# Patient Record
Sex: FEMALE | Race: OTHER | HISPANIC OR LATINO | Employment: FULL TIME | URBAN - METROPOLITAN AREA
[De-identification: names, ages, dates, MRNs, and addresses within clinical notes are randomized per-mention and may not be internally consistent; named-entity substitution may affect disease eponyms.]

---

## 2018-06-12 ENCOUNTER — HOSPITAL ENCOUNTER (EMERGENCY)
Facility: HOSPITAL | Age: 33
Discharge: HOME | End: 2018-06-12
Attending: EMERGENCY MEDICINE | Admitting: EMERGENCY MEDICINE
Payer: COMMERCIAL

## 2018-06-12 VITALS
RESPIRATION RATE: 18 BRPM | SYSTOLIC BLOOD PRESSURE: 125 MMHG | DIASTOLIC BLOOD PRESSURE: 63 MMHG | HEART RATE: 97 BPM | TEMPERATURE: 97.9 F | WEIGHT: 159 LBS | OXYGEN SATURATION: 97 %

## 2018-06-12 DIAGNOSIS — K02.9 INFECTED DENTAL CARRIES: ICD-10-CM

## 2018-06-12 DIAGNOSIS — K04.7 INFECTED DENTAL CARRIES: ICD-10-CM

## 2018-06-12 DIAGNOSIS — K08.89 DENTALGIA: Primary | ICD-10-CM

## 2018-06-12 PROCEDURE — 99282 EMERGENCY DEPT VISIT SF MDM: CPT

## 2018-06-12 PROCEDURE — 96365 THER/PROPH/DIAG IV INF INIT: CPT

## 2018-06-12 PROCEDURE — 96375 TX/PRO/DX INJ NEW DRUG ADDON: CPT

## 2018-06-12 RX ORDER — KETOROLAC TROMETHAMINE 30 MG/ML
30 INJECTION, SOLUTION INTRAMUSCULAR; INTRAVENOUS ONCE
Status: COMPLETED | OUTPATIENT
Start: 2018-06-12 | End: 2018-06-12

## 2018-06-12 RX ORDER — ACETAMINOPHEN 500 MG
1000 TABLET ORAL EVERY 6 HOURS PRN
Qty: 30 TABLET | Refills: 0 | Status: SHIPPED | OUTPATIENT
Start: 2018-06-12 | End: 2018-09-09

## 2018-06-12 RX ORDER — IBUPROFEN 600 MG/1
600 TABLET ORAL ONCE
Status: COMPLETED | OUTPATIENT
Start: 2018-06-12 | End: 2018-06-12

## 2018-06-12 RX ORDER — PENICILLIN V POTASSIUM 500 MG/1
500 TABLET ORAL 3 TIMES DAILY
Qty: 21 TABLET | Refills: 0 | Status: SHIPPED | OUTPATIENT
Start: 2018-06-12 | End: 2018-06-19

## 2018-06-12 RX ORDER — CLINDAMYCIN PHOSPHATE 600 MG/50ML
600 INJECTION INTRAVENOUS ONCE
Status: COMPLETED | OUTPATIENT
Start: 2018-06-12 | End: 2018-06-12

## 2018-06-12 RX ORDER — IBUPROFEN 400 MG/1
600 TABLET ORAL EVERY 6 HOURS PRN
Qty: 30 TABLET | Refills: 0 | Status: SHIPPED | OUTPATIENT
Start: 2018-06-12 | End: 2018-09-09

## 2018-06-12 RX ORDER — PENICILLIN V POTASSIUM 250 MG/1
500 TABLET ORAL ONCE
Status: COMPLETED | OUTPATIENT
Start: 2018-06-12 | End: 2018-06-12

## 2018-06-12 RX ORDER — ACETAMINOPHEN 325 MG/1
975 TABLET ORAL ONCE
Status: COMPLETED | OUTPATIENT
Start: 2018-06-12 | End: 2018-06-12

## 2018-06-12 RX ADMIN — CLINDAMYCIN PHOSPHATE 600 MG: 12 INJECTION, SOLUTION INTRAMUSCULAR; INTRAVENOUS at 03:10

## 2018-06-12 RX ADMIN — IBUPROFEN 600 MG: 600 TABLET ORAL at 02:45

## 2018-06-12 RX ADMIN — PENICILLIN V POTASSIUM 500 MG: 250 TABLET, FILM COATED ORAL at 02:45

## 2018-06-12 RX ADMIN — ACETAMINOPHEN 975 MG: 325 TABLET ORAL at 02:45

## 2018-06-12 RX ADMIN — KETOROLAC TROMETHAMINE 30 MG: 30 INJECTION, SOLUTION INTRAMUSCULAR at 03:07

## 2018-06-12 NOTE — DISCHARGE INSTRUCTIONS
Caries dentales   CUIDADO AMBULATORIO:   Caries dentales  también se conocen roberto cavidades  Las caries son causadas por andreea bacteria  Las bacterias se mezclan con los carbohidratos de los alimentos y crean ácidos  El ácido descompone áreas del esmalte que cubre la parte exterior del diente  [place] crea un pequeño orificio en el diente llamado caries  Busque atención médica de inmediato si:   · Siente mucho dolor en el diente o la [place]  · Tiene hinchazón en [address]  Comuníquese con gonzalez dentista si:   · Usted tiene fiebre  · El dolor en gonzalez diente empeora  · Usted tiene preguntas o inquietudes acerca de gonzalez condición o cuidado  Síntomas de la caries dental:  Es posible que usted no tenga ningún síntoma cuando las caries dentales se están empezando a formar  Usted podría sentir dolor cuando las caries dentales comiencen a llegar a las partes más profundas del diente  El dolor puede empeorar al [employer] o comer alimentos calientes o fríos  El tratamiento para la caries dental  podría incluir cualquiera de los siguientes:  · Los tratamientos con fluoruro  se pueden administrar pavan gonzalez consulta o usted podría usar productos que contengan fluoruro en gonzalez hogar  El fluoruro viene en diferentes formas, roberto el enjuague bucal o gel  Usted puede comprar fluoruro con o sin andreea orden del dentista  Gonzalez dentista le indicará que tipo de fluoruro necesita comprar y [place]  · Un empaste  puede aplicársele en el diente después de que le hayan extraído la parte deteriorada  El empaste podría ayudar a evitar un deterioro mayor  Prevenga las caries dentales:   · Cepille los dientes al menos 2 veces al día con andreea pasta de dientes con fluoruro  · Use hilo dental para limpiar entre los dientes al menos andreea vez al día  · Enjuague la boca con agua o con enjuague bucal después de cada comida y [place]  · Mastique chicle sin azúcar después de cada comida y [place]      · Visite a gonzalez dentista regularmente cada 6 meses para limpiezas dentales y exámenes orales  Acuda a shamar consultas de control con madden médico según le indicaron  Anote shamar preguntas para que se acuerde de hacerlas pavan shamar visitas  © 2017 [address] [employer] Information is for End User's use only and may not be sold, redistributed or otherwise used for commercial purposes  All illustrations and images included in CareNotes® are the copyrighted property of A D A M , Inc  or Chance [employer]  Esta información es sólo para uso en educación  Madden intención no es darle un consejo médico sobre enfermedades o tratamientos  Colsulte con madden Stormy Binder farmacéutico antes de seguir cualquier régimen médico para saber si es seguro y efectivo para usted

## 2018-06-12 NOTE — ED PROVIDER NOTES
History  Chief Complaint   Patient presents with    Dental Pain     Pt reports upper and lower left sided dental pain that started 2 days ago  Took ibuprofen yesterday at 1900 without relief  This is a 26 y o  old female who presents to the ED for evaluation of dental pain  Patient reports 2 days of gradual worsening pain on the left upper and left lower jaw  She has history of poor dentition  Has not seen a dentist in quite some time  Does not have dental insurance but does have medical insurance  No fever or chills  She has tried Motrin with no relief  Last dose was about 6 hours ago  There is no trismus  There is no facial swelling  She is tolerating her secretions  She is well-appearing in bed and texting on her phone during the interview  None     Past Medical History:   Diagnosis Date    Asthma      History reviewed  No pertinent surgical history  History reviewed  No pertinent family history  I have reviewed and agree with the history as documented  Social History   Substance Use Topics    Smoking status: Current Every Day Smoker     Types: Cigarettes    Smokeless tobacco: Not on file    Alcohol use Yes      Comment: socially       Review of Systems   Constitutional: Negative for chills, fatigue, fever and unexpected weight change  HENT: Positive for dental problem  Negative for congestion, rhinorrhea and sore throat  Eyes: Negative for redness and visual disturbance  Respiratory: Negative for cough and shortness of breath  Cardiovascular: Negative for chest pain and leg swelling  Gastrointestinal: Negative for abdominal pain, constipation, diarrhea, nausea and vomiting  Endocrine: Negative for cold intolerance and heat intolerance  Genitourinary: Negative for dysuria, frequency and urgency  Musculoskeletal: Negative for back pain  Skin: Negative for rash  Neurological: Negative for dizziness, syncope and numbness     All other systems reviewed and are negative  Physical Exam  ED Triage Vitals [06/12/18 0224]   Temperature Pulse Respirations Blood Pressure SpO2   97 9 °F (36 6 °C) 97 18 125/63 97 %      Temp Source Heart Rate Source Patient Position - Orthostatic VS BP Location FiO2 (%)   Temporal Monitor -- Right arm --      Pain Score       9         Physical Exam   Constitutional: She is oriented to person, place, and time  She appears well-developed and well-nourished  No distress  HENT:   Head: Normocephalic and atraumatic  Nose: Nose normal    Mouth/Throat: Oropharynx is clear and moist  Dental caries (upper and lower L side, teeth 15, 18, 19) present  Eyes: Pupils are equal, round, and reactive to light  Neck: Normal range of motion  Pulmonary/Chest: Effort normal  No stridor  No respiratory distress  Musculoskeletal: Normal range of motion  Neurological: She is alert and oriented to person, place, and time  Moving all extremities equally   Skin: Skin is warm and dry  No rash noted  She is not diaphoretic  Psychiatric: She has a normal mood and affect  Nursing note and vitals reviewed  ED Medications  Medications   penicillin V potassium (VEETID) tablet 500 mg (500 mg Oral Given 6/12/18 0245)   ibuprofen (MOTRIN) tablet 600 mg (600 mg Oral Given 6/12/18 0245)   acetaminophen (TYLENOL) tablet 975 mg (975 mg Oral Given 6/12/18 0245)   clindamycin (CLEOCIN) IVPB (premix) 600 mg (0 mg Intravenous Stopped 6/12/18 0338)   ketorolac (TORADOL) injection 30 mg (30 mg Intravenous Given 6/12/18 0307)     Diagnostic Studies  Results Reviewed     None        No orders to display     Procedures  Procedures    Phone Consults  ED Phone Contact    ED Course     A/P: This is a 26 y o  female who presents to the ED for evaluation of dental pain  Patient has multiple obvious cavities in her mouth consistent with her history of poor dentition  Will treat Motrin, Tylenol as patient refused IM injection of Toradol  Treat with penicillin    Referred to dental     At time of discharge when the attending so the patient patient was adamant that she needed IV antibiotics and that Motrin Tylenol do not help her pain anterior requested something stronger  Multiple attempts to  the patient that that was not indicated for her current complaint or unsuccessful the patient demanded a medically necessary treatment of IV antibiotics although she had her taking the oral penicillin  An IV was placed by the nurse, IV Toradol will be given and patient was dosed with IV clindamycin  Immediately following her IV antibiotics a fevers moved and she was discharged home with outpatient follow-up and Motrin and Tylenol for home use  MDM  CritCare Time    Disposition  Final diagnoses:   Aren Bailey   Infected dental carries     Time reflects when diagnosis was documented in both MDM as applicable and the Disposition within this note     Time User Action Codes Description Comment    6/12/2018  2:42 AM Hildy Perches Add [K08 89] Aren Bailey     6/12/2018  2:42 AM Hildy Perches Add [K02 9,  K04 7] Infected dental carries       ED Disposition     ED Disposition Condition Comment    Discharge  Pradeep Suarez discharge to home/self care      Condition at discharge: Good        Follow-up Information     Follow up With Specialties Details [facility]  Call in 1 day  [address]  [phone]    Attached list of detnal clinics              Discharge Medication List as of 6/12/2018  2:43 AM      START taking these medications    Details   acetaminophen (TYLENOL) 500 mg tablet Take 2 tablets (1,000 mg total) by mouth every 6 (six) hours as needed for mild pain, Starting Tue 6/12/2018, Print      ibuprofen (MOTRIN) 400 mg tablet Take 1 5 tablets (600 mg total) by mouth every 6 (six) hours as needed for mild pain, Starting Tue 6/12/2018, Print      penicillin V potassium (VEETID) 500 mg tablet Take 1 tablet (500 mg total) by mouth 3 (three) times a day for 7 days, Starting Tue 6/12/2018, Until Tue 6/19/2018, Print           No discharge procedures on file  ED Provider  Attending physically available and evaluated [facility]  I managed the patient along with the ED Attending      Electronically Signed by         Blanca Rebollar MD  06/12/18 4366

## 2018-06-12 NOTE — ED ATTENDING ATTESTATION
Bree Lanza DO, saw and evaluated the patient  I have discussed the patient with the resident/non-physician practitioner and agree with the resident's/non-physician practitioner's findings, Plan of Care, and MDM as documented in the resident's/non-physician practitioner's note, except where noted  All available labs and Radiology studies were reviewed  At this point I agree with the current assessment done in the Emergency Department  I have conducted an independent evaluation of this patient a history and physical is as follows:    27 yo female from [place] with horrible dentition who presents with dental pain (upper and lower L side, teeth 15, 18, 19)  Had already been seen by resident who ordered her tylenol, motrin and PCN  Took PCN but refused tylenol and motrin saying she has that at home and it doesn't work  I saw pt and she was adamant she needed a dose of IV abx and refusing to leave without them  IV clinda and toradol ordered       Final diagnoses:   Pk Camera   Infected dental carries       Critical Care Time  CritCare Time    Procedures

## 2018-06-12 NOTE — ED NOTES
Pt refused tylenol and motrin at this time  Pt states "I have tylenol and motrin at home   It doesn't work and I dont want it"     Rad Ro RN  06/12/18 8637

## 2018-08-12 ENCOUNTER — APPOINTMENT (EMERGENCY)
Dept: CT IMAGING | Facility: HOSPITAL | Age: 33
End: 2018-08-12
Payer: MEDICARE

## 2018-08-12 ENCOUNTER — HOSPITAL ENCOUNTER (EMERGENCY)
Facility: HOSPITAL | Age: 33
Discharge: HOME | End: 2018-08-12
Payer: MEDICARE

## 2018-08-12 ENCOUNTER — APPOINTMENT (EMERGENCY)
Dept: RADIOLOGY | Facility: HOSPITAL | Age: 33
End: 2018-08-12
Payer: MEDICARE

## 2018-08-12 VITALS
SYSTOLIC BLOOD PRESSURE: 120 MMHG | TEMPERATURE: 98.1 F | OXYGEN SATURATION: 98 % | HEART RATE: 95 BPM | RESPIRATION RATE: 18 BRPM | DIASTOLIC BLOOD PRESSURE: 84 MMHG | WEIGHT: 158 LBS

## 2018-08-12 DIAGNOSIS — M25.511 RIGHT SHOULDER PAIN: ICD-10-CM

## 2018-08-12 DIAGNOSIS — R51.9 HEADACHE: ICD-10-CM

## 2018-08-12 DIAGNOSIS — S80.851A: Primary | ICD-10-CM

## 2018-08-12 DIAGNOSIS — Z87.828 HISTORY OF GUNSHOT WOUND: ICD-10-CM

## 2018-08-12 DIAGNOSIS — J45.909 ASTHMA: ICD-10-CM

## 2018-08-12 DIAGNOSIS — L08.9: Primary | ICD-10-CM

## 2018-08-12 LAB
ALBUMIN SERPL BCP-MCNC: 3.8 G/DL (ref 3.5–5)
ALP SERPL-CCNC: 114 U/L (ref 46–116)
ALT SERPL W P-5'-P-CCNC: 26 U/L (ref 12–78)
ANION GAP SERPL CALCULATED.3IONS-SCNC: 9 MMOL/L (ref 4–13)
AST SERPL W P-5'-P-CCNC: 18 U/L (ref 5–45)
BACTERIA UR QL AUTO: ABNORMAL /HPF
BASOPHILS # BLD AUTO: 0.05 THOUSANDS/ΜL (ref 0–0.1)
BASOPHILS NFR BLD AUTO: 1 % (ref 0–1)
BILIRUB SERPL-MCNC: 0.19 MG/DL (ref 0.2–1)
BILIRUB UR QL STRIP: NEGATIVE
BUN SERPL-MCNC: 12 MG/DL (ref 5–25)
CALCIUM SERPL-MCNC: 9 MG/DL (ref 8.3–10.1)
CHLORIDE SERPL-SCNC: 102 MMOL/L (ref 100–108)
CLARITY UR: CLEAR
CO2 SERPL-SCNC: 27 MMOL/L (ref 21–32)
COLOR UR: YELLOW
CREAT SERPL-MCNC: 0.75 MG/DL (ref 0.6–1.3)
EOSINOPHIL # BLD AUTO: 0.23 THOUSAND/ΜL (ref 0–0.61)
EOSINOPHIL NFR BLD AUTO: 3 % (ref 0–6)
ERYTHROCYTE [DISTWIDTH] IN BLOOD BY AUTOMATED COUNT: 13.5 % (ref 11.6–15.1)
EXT PREG TEST URINE: NEGATIVE
GFR SERPL CREATININE-BSD FRML MDRD: 104 ML/MIN/1.73SQ M
GLUCOSE SERPL-MCNC: 97 MG/DL (ref 65–140)
GLUCOSE UR STRIP-MCNC: NEGATIVE MG/DL
HCT VFR BLD AUTO: 37.5 % (ref 34.8–46.1)
HGB BLD-MCNC: 12.8 G/DL (ref 11.5–15.4)
HGB UR QL STRIP.AUTO: ABNORMAL
KETONES UR STRIP-MCNC: NEGATIVE MG/DL
LEUKOCYTE ESTERASE UR QL STRIP: NEGATIVE
LYMPHOCYTES # BLD AUTO: 1.98 THOUSANDS/ΜL (ref 0.6–4.47)
LYMPHOCYTES NFR BLD AUTO: 24 % (ref 14–44)
MCH RBC QN AUTO: 30.5 PG (ref 26.8–34.3)
MCHC RBC AUTO-ENTMCNC: 34.1 G/DL (ref 31.4–37.4)
MCV RBC AUTO: 89 FL (ref 82–98)
MONOCYTES # BLD AUTO: 0.63 THOUSAND/ΜL (ref 0.17–1.22)
MONOCYTES NFR BLD AUTO: 8 % (ref 4–12)
NEUTROPHILS # BLD AUTO: 5.25 THOUSANDS/ΜL (ref 1.85–7.62)
NEUTS SEG NFR BLD AUTO: 65 % (ref 43–75)
NITRITE UR QL STRIP: NEGATIVE
NON-SQ EPI CELLS URNS QL MICRO: ABNORMAL /HPF
NRBC BLD AUTO-RTO: 0 /100 WBCS
PH UR STRIP.AUTO: 6.5 [PH] (ref 4.5–8)
PLATELET # BLD AUTO: 298 THOUSANDS/UL (ref 149–390)
PMV BLD AUTO: 10.4 FL (ref 8.9–12.7)
POTASSIUM SERPL-SCNC: 3.8 MMOL/L (ref 3.5–5.3)
PROT SERPL-MCNC: 7.8 G/DL (ref 6.4–8.2)
PROT UR STRIP-MCNC: NEGATIVE MG/DL
RBC # BLD AUTO: 4.2 MILLION/UL (ref 3.81–5.12)
RBC #/AREA URNS AUTO: ABNORMAL /HPF
SODIUM SERPL-SCNC: 138 MMOL/L (ref 136–145)
SP GR UR STRIP.AUTO: 1.02 (ref 1–1.03)
UROBILINOGEN UR QL STRIP.AUTO: 0.2 E.U./DL
WBC # BLD AUTO: 8.14 THOUSAND/UL (ref 4.31–10.16)
WBC #/AREA URNS AUTO: ABNORMAL /HPF

## 2018-08-12 PROCEDURE — 99284 EMERGENCY DEPT VISIT MOD MDM: CPT

## 2018-08-12 PROCEDURE — 36415 COLL VENOUS BLD VENIPUNCTURE: CPT | Performed by: PHYSICIAN ASSISTANT

## 2018-08-12 PROCEDURE — 71046 X-RAY EXAM CHEST 2 VIEWS: CPT

## 2018-08-12 PROCEDURE — 85025 COMPLETE CBC W/AUTO DIFF WBC: CPT | Performed by: PHYSICIAN ASSISTANT

## 2018-08-12 PROCEDURE — 80053 COMPREHEN METABOLIC PANEL: CPT | Performed by: PHYSICIAN ASSISTANT

## 2018-08-12 PROCEDURE — 70450 CT HEAD/BRAIN W/O DYE: CPT

## 2018-08-12 PROCEDURE — 73060 X-RAY EXAM OF HUMERUS: CPT

## 2018-08-12 PROCEDURE — 81025 URINE PREGNANCY TEST: CPT | Performed by: PHYSICIAN ASSISTANT

## 2018-08-12 PROCEDURE — 81001 URINALYSIS AUTO W/SCOPE: CPT

## 2018-08-12 PROCEDURE — 73552 X-RAY EXAM OF FEMUR 2/>: CPT

## 2018-08-12 RX ORDER — LIDOCAINE HYDROCHLORIDE AND EPINEPHRINE 10; 10 MG/ML; UG/ML
10 INJECTION, SOLUTION INFILTRATION; PERINEURAL ONCE
Status: COMPLETED | OUTPATIENT
Start: 2018-08-12 | End: 2018-08-12

## 2018-08-12 RX ORDER — ACETAMINOPHEN 500 MG
500 TABLET ORAL EVERY 6 HOURS PRN
Qty: 30 TABLET | Refills: 0 | Status: SHIPPED | OUTPATIENT
Start: 2018-08-12 | End: 2020-12-23

## 2018-08-12 RX ORDER — AMOXICILLIN AND CLAVULANATE POTASSIUM 875; 125 MG/1; MG/1
1 TABLET, FILM COATED ORAL EVERY 12 HOURS SCHEDULED
Qty: 20 TABLET | Refills: 0 | Status: SHIPPED | OUTPATIENT
Start: 2018-08-12 | End: 2018-08-22

## 2018-08-12 RX ORDER — ACETAMINOPHEN 325 MG/1
975 TABLET ORAL ONCE
Status: COMPLETED | OUTPATIENT
Start: 2018-08-12 | End: 2018-08-12

## 2018-08-12 RX ORDER — IBUPROFEN 400 MG/1
800 TABLET ORAL ONCE
Status: COMPLETED | OUTPATIENT
Start: 2018-08-12 | End: 2018-08-12

## 2018-08-12 RX ORDER — IBUPROFEN 400 MG/1
400 TABLET ORAL EVERY 6 HOURS PRN
Qty: 30 TABLET | Refills: 0 | Status: SHIPPED | OUTPATIENT
Start: 2018-08-12 | End: 2018-09-23

## 2018-08-12 RX ORDER — ALBUTEROL SULFATE 90 UG/1
2 AEROSOL, METERED RESPIRATORY (INHALATION) ONCE
Status: COMPLETED | OUTPATIENT
Start: 2018-08-12 | End: 2018-08-12

## 2018-08-12 RX ADMIN — IBUPROFEN 800 MG: 400 TABLET ORAL at 03:34

## 2018-08-12 RX ADMIN — ACETAMINOPHEN 975 MG: 325 TABLET, FILM COATED ORAL at 03:34

## 2018-08-12 RX ADMIN — LIDOCAINE HYDROCHLORIDE AND EPINEPHRINE 10 ML: 10; 10 INJECTION, SOLUTION INFILTRATION; PERINEURAL at 03:17

## 2018-08-12 RX ADMIN — ALBUTEROL SULFATE 2 PUFF: 90 AEROSOL, METERED RESPIRATORY (INHALATION) at 04:08

## 2018-08-12 NOTE — DISCHARGE INSTRUCTIONS
Dolor de héctor katelyn   LO QUE NECESITA SABER:   El dolor de [place] katelyn es un dolor o molestia que comienza de michoacano y HIMA rápidamente  Usted puede tener un dolor de héctor katelyn sólo cuando siente estrés o come ciertos alimentos  Otro tipo dolor de héctor katelyn puede producirse todos los días y a veces varias veces al día  [facility]IONES [facility]BRE [facility]:   Regrese a la hailey de emergencias si:   · Usted tiene dolor intenso  · Usted tiene entumecimiento en un lado de gonzalez veena o cuerpo  · Usted tiene un dolor de héctor que ocurre después de un golpe en la héctor, andreea caída u otro trauma  · Tiene dolor de [place], está olvidadizo o confundido o tiene dificultad para hablar  · Tiene dolor de [place], rigidez en el seth y [place]  Pregúntele a gonzalez Wendy Byes vitaminas y minerales son adecuados para usted  · Usted tiene un dolor de héctor zarina y está vomitando  · Usted tiene dolor de héctor todos los días y no se [place] aun después de tratarlo  · Yesy kolton de [facility] u ocurren nuevos síntomas cuando tiene dolor de [place]  · Usted tiene preguntas o inquietudes acerca de gonzalez condición o cuidado  Medicamentos:  Es posible que usted necesite alguno de los siguientes:  · Un medicamento con receta para el dolor  podrían ser Edith Braden  El medicamento que recomienda gonzalez médico dependerá del tipo de dolor de héctor que tenga  Usted necesitará elle medicamentos para el dolor de héctor según las indicaciones para evitar un problema llamado dolor de héctor de rebote  Estos kolton de [place] ocurren con el uso regular de analgésicos para los trastornos de dolor de [place]  · AINEs (Analgésicos antiinflamatorios no esteroides) roberto el ibuprofeno, ayudan a disminuir la inflamación, el dolor y la [place]  Jeanie medicamento esta disponible con o sin andreea receta médica  Los AINEs pueden causar sangrado estomacal o problemas renales en ciertas personas   Si usted mark un medicamento anticoagulante, siempre pregúntele a gonzalez médico si los SIMIN son seguros para usted  Siempre hoa la etiqueta de aisha medicamento y [place] instrucciones  · El acetaminofén  Kissousa el dolor y baja la fiebre  Está disponible sin receta médica  Pregunte la cantidad y la frecuencia con que debe tomarlos  [address]  Hoa las etiquetas de todos los demás medicamentos que esté usando para saber si también contienen acetaminofén, o pregunte a gonzalez médico o farmacéutico  El acetaminofén puede causar daño en el hígado cuando no se mark de forma correcta  No use más de 3 gramos (3,000 miligramos) en total de acetaminofeno en un día  · Antidepresivos  se pueden administrar para algunos tipos de kolton de [place]  · [place] shamar medicamentos roberto se le haya indicado  Consulte con gonzalez médico si usted fitz que gonzalez medicamento no le está ayudando o si presenta efectos secundarios  Infórmele si es alérgico a cualquier medicamento  Mantenga andreea lista actualizada de los [place], las vitaminas y los productos herbales que mark  Incluya los siguientes datos de los medicamentos: cantidad, frecuencia y motivo de administración  Traiga con usted la lista o los envases de la píldoras a shamar citas de seguimiento  Lleve la lista de los medicamentos con usted en claudette de andreea emergencia  El manejo de gonzalez síntomas:   · Aplique hielo o calor  en la jorge donde gonzalez hijo siente el dolor de héctor  Utilice un paquete (compresa) de hielo o calor  Para un paquete de hielo, también puede colocar hielo molido en andreea bolsa plástica  Cubra el paquete de hielo o la bolsa con andreea toalla pequeña antes de aplicarla en la piel  Tanto el hielo roberto el calor ayudan a reducir el dolor, y el calor también contribuye a reducir los [employer]  Aplique calor pavan 20 a 30 minutos cada 2 horas  Aplique hielo pavan 15 a 20 minutos cada hora  Aplique calor o hielo pavan el tiempo y la cantidad de días que se le indique   Levy Allen puede alternar el calor y el hielo  · Relaje shamar músculos  Acuéstese en andreea posición cómoda y cierre shamar ojos  Relaje shamar músculos lentamente  Comience por los dedos de los pies y avance hacia arriba al saji de gonzalez cuerpo  · Registre en un diario shamar kolton de [place]  Escriba cuándo comienzan y terminan shamar migrañas  Alexei Dan y qué estaba haciendo cuando comenzó la migraña  Registre lo que comió y lo que tomó las 24 horas previas al comienzo de gonzalez migraña  Cleta Purchase dolor y dónde le duele: Lleve un registro de lo que hizo para tratar gonzalez Dickey Midget y si obtuvo un resultado satisfactorio  Cómo prevenir un dolor de héctor katelyn:   · Evite cualquier cosa que provoque un dolor de héctor katelyn  Los ejemplos incluyen la exposición a sustancias químicas, las grandes altitudes o no dormir lo suficiente  [place] andreea rutina para dormir  Acuéstese y [employer] días a la misma hora  No utilice aparatos electrónicos antes de acostarse  Pueden provocarle un dolor de héctor o impedirle dormir balta  · No fume  La nicotina y otras sustancias químicas en los cigarrillos y puros pueden desencadenar un dolor de héctor katelyn o [place]  Pida información a gonzalez médico si usted actualmente fuma y necesita ayuda para dejar de fumar  Los cigarrillos electrónicos o tabaco sin humo todavía contienen nicotina  Consulte con gonzalez médico antes de [employer]  · Limite el consumo de alcohol según le indicaron  El alcohol puede provocar un dolor de héctor katelyn o empeorarlo  Si usted tiene kolton de [place] de racimo, no chyna alcohol pavan un episodio  Para otros tipos de kolton de [place], pregúntele a gonzalez proveedor de atención médica si es seguro para usted beber alcohol  Pregunte cuál es la cantidad hayden que puede beber y con qué frecuencia  · Ejercítese según indicaciones  El ejercicio puede reducir la tensión y [place] a aliviar el dolor de [place]   Propóngase hacer 30 minutos de [place] física frantz todos los días de la [place]  Gonzalez médico puede ayudarle a crear un plan de ejercicios  · Consuma alimentos saludables y variados  [address] frutas, verduras, productos lácteos bajos en grasa, pilar [place], pescado y frijoles cocidos  Gonzalez médico o dietista puede ayudarle a crear planes de comidas si desea evitar los alimentos que provocan kolton de [place]  Acuda a shamar consultas de control con gonzalez médico según le indicaron  Traiga gonzalez registro de kolton de héctor con usted cuando visite a gonzalez médico  Anote shamar preguntas para que se acuerde de hacerlas pavan shamar visitas  © 2017 [address] Information is for End User's use only and may not be sold, redistributed or otherwise used for commercial purposes  All illustrations and images included in CareNotes® are the copyrighted property of A D A M , Inc  or Chance Joe  Esta información es sólo para uso en educación  Gonzalez intención no es darle un consejo médico sobre enfermedades o tratamientos  Colsulte con gonzalez Teresa [employer] farmacéutico antes de seguir cualquier régimen médico para saber si es seguro y efectivo para usted  Dolor de brazo   LO QUE NECESITA SABER:   Gonzalez dolor de brazo puede ser causado por ciertas condiciones  Unos ejemplos incluyen artritis, problemas del nervio o tomó andreea posición pippa mientras durmió  [place]a radiografía no mostró un hueso roto en gonzalez brazo ni arnold  El dolor de brazo puede ser un signo de andreea condición grave que necesita atención médica inmediata, roberto un ataque al corazón  INSTRU[facility]IONES [facility]BRE [facility]:   Llame al 911 en claudette de presentar lo siguiente:  Tiene alguno de los siguientes signos de un ataque cardíaco:   · Estornudos, presión, o dolor en gonzalez pecho que dura mas de 5 minutos o regresa      · Malestar o dolor en gonzalez espalda, seth, mandíbula, abdomen, o brazo     · Dificultad para respirar o latidos cardíacos rápidos y palpitantes    · Náuseas o vómito    · Siente un desvanecimiento o tiene sudores fríos especialmente en el pecho o dificultad para respirar  Regrese a la hailey de emergencias si:   · Usted tiene un dolor intenso o dolor que se desplaza de gonzalez brazo a otras áreas  · Usted tiene inflamación, cosquilleo o entumecimiento en gonzalez mano o dedos, o gonzalez piel se torna gabe  · Usted no puede  el brazo  Pregúntele a gonzalez Rochelle Remedies vitaminas y minerales son adecuados para usted  · Usted tiene preguntas o inquietudes acerca de gonzalez condición o cuidado  Medicamentos:  Es posible que usted necesite alguno de los siguientes:  · Un medicamento con receta para el dolor  podrían ser [place]  Pregunte cómo elle estos medicamentos de andreea forma hayden  · AINEs (Analgésicos antiinflamatorios no esteroides) roberto el ibuprofeno, ayudan a disminuir la inflamación, el dolor y la [place]  Aisha medicamento esta disponible con o sin andreea receta médica  Los AINEs pueden causar sangrado estomacal o problemas renales en ciertas personas  Si usted mark un medicamento anticoagulante, siempre pregúntele a gonzalez médico si los SIMIN son seguros para usted  Siempre sixto la etiqueta de aisha medicamento y [place] instrucciones  · [place] shamar medicamentos roberto se le haya indicado  Consulte con gonzalez médico si usted fitz que gonzalez medicamento no le está ayudando o si presenta efectos secundarios  Infórmele si es alérgico a cualquier medicamento  Mantenga andreea lista actualizada de los [place], las vitaminas y los productos herbales que mark  Incluya los siguientes datos de los medicamentos: cantidad, frecuencia y motivo de administración  Traiga con usted la lista o los envases de la píldoras a shamar citas de seguimiento  Lleve la lista de los medicamentos con usted en claudette de andreea emergencia  Cuidados personales:   · Repose gonzalez brazo según le indicaron    Arturo Curls o cabestrillo se puede usar para inmovilizar gonzalez brazo mientras se recupera  · Aplique hielo según las indicaciones  El hielo ayuda a disminuir el dolor y la inflamación  El hielo también puede contribuir a evitar el daño de los tejidos  Use un paquete de hielo o ponga hielo molido dentro de [employer]  Cúbrala con andreea toalla  Aplique el hielo por 20 minutos sobre madden Jesika Harvey, con cierta frecuencia [address] indicaciones  Pregunte cuántas veces necesita aplicar el hielo al día y por cuántos días  · Eleve madden brazo por encima del nivel de madden corazón con la frecuencia que pueda  [place] va a disminuir inflamación y el dolor  Apoye el brazo sobre almohadas o [place] para mantener el área elevada de andreea forma cómoda  · Ajuste madden posición si usted trabaja frente a andreea computadora  Es posible que necesite andreea silla con reposabrazos o apoyo para la arnold al igual que cambiar la altura de la silla  · Mantenga un registro del dolor  Anote cuando tiene dolor y cuál es la intensidad  Incluya cualquier otro síntoma que sienta además del dolor  Un registro sirve para ayudar a mantener un seguimiento de los ciclos del dolor  Deven Blood registro con usted a shamar citas de seguimiento  También podría ayudarle a madden médico a encontrar la causa del dolor  Acuda a shamar consultas de control con madden médico según le indicaron  Usted podría necesitar fisioterapia  Es posible que necesite edward a un especialista ortopédico  Anote shamar preguntas para que se acuerde de hacerlas pavan shamar visitas  © 2017 [address] Information is for End User's use only and may not be sold, redistributed or otherwise used for commercial purposes  All illustrations and images included in CareNotes® are the copyrighted property of A D A M , Inc  or Chance Joe  Esta información es sólo para uso en educación  Madden intención no es darle un consejo médico sobre enfermedades o tratamientos   Colsulte con madden [place] farmacéutico antes de seguir cualquier [place] médico para saber si es seguro y efectivo para usted  Cuerpo extraño en tejido blando   LO QUE NECESITA SABER:   Un cuerpo extraño puede disolverse o salirse de la piel sin necesidad de [place]  [place] podría elle varias semanas o meses en que suceda  La piel la puede sentir tirante y adolorida después que le retiren el objeto extraño  [place] es normal y debería mejorar en un par de días  INSTRUCCIONES [facility]BRE [facility]:   Regrese a la hailey de emergencias si:   · La vinnie empapa el vendaje  · Se desprenden los puntos de sutura  · Nota líneas espinosa en la piel cerca de gonzalez herida  · Sangra y la hemorragia no se detiene después de poner presión firme y directa sobre la herida pavan 10 minutos  Pregúntele a gonzalez Wendy Byes vitaminas y minerales son adecuados para usted  · Usted tiene fiebre  · Gonzalez herida está lila, inflamada y drena pus  · Yesy síntomas, roberto el dolor no desaparece o incluso empeora  · Usted tiene preguntas o inquietudes acerca de gonzalez condición o cuidado  Medicamentos:  Es posible que usted necesite alguno de los siguientes:  · Antibióticos  ayudan a evitar andreea infección bacteriana  · El acetaminofén  Kissousa el dolor y baja la fiebre  Está disponible sin receta médica  Pregunte la cantidad y la frecuencia con que debe tomarlos  [address]  El acetaminofén puede causar daño en el hígado cuando no se mark de forma correcta  · AINEs (Analgésicos antiinflamatorios no esteroides) roberto el ibuprofeno, ayudan a disminuir la inflamación, el dolor y la [place]  Aisha medicamento esta disponible con o sin andreea receta médica  Los AINEs pueden causar sangrado estomacal o problemas renales en ciertas personas  Si usted mark un medicamento anticoagulante, siempre pregúntele a gonzalez médico si los SIMIN son seguros para usted  Siempre sixto la etiqueta de aisha medicamento y [employer] instrucciones  · Un medicamento con receta para el dolor  podrían ser Edith Braden  Pregunte al médico cómo debe elle aisha medicamento de forma hayden  Algunos medicamentos recetados para el dolor contienen acetaminofén  No tome otros medicamentos que contengan acetaminofén sin consultarlo con gonzalez médico  Demasiado acetaminofeno puede causar daño al hígado  Los medicamentos recetados para el dolor podrían causar estreñimiento  Pregunte a gonzalez médico roberto prevenir o tratar estreñimiento  · [place] shamar medicamentos roberto se le haya indicado  Consulte con gonzalez médico si usted fitz que gonzalez medicamento no le está ayudando o si presenta efectos secundarios  Infórmele si es alérgico a algún medicamento  Mantenga andreea lista actualizada de los [place], las vitaminas y los productos herbales que mark  Incluya los siguientes datos de los medicamentos: cantidad, frecuencia y motivo de administración  Traiga con usted la lista o los envases de la píldoras a shamar citas de seguimiento  Lleve la lista de los medicamentos con usted en claudette de adnreea emergencia  Eleve  el área lesionada por encima del nivel del corazón tan seguido roberto pueda  [place] va a disminuir inflamación y el dolor  Coloque el área lesionada sobre almohadas o cobijas para mantenerla elevada cómodamente  Aplique hielo  en la herida de 15 a 20 minutos cada hora o roberto se le indique  Use un paquete de hielo o ponga hielo molido dentro de [employer]  Cúbralo con andreea toalla antes de aplicarlo sobre gonzalez piel  El hielo ayuda a evitar daño al tejido y a disminuir la inflamación y el dolor  Siga las instrucciones de gonzalez médico sobre el cuidado de shamar heridas:  La piel la puede sentir tirante y adolorida después que le retiren el objeto extraño  [place] es normal y debería mejorar en un par de días  Mantenga la herida limpia y seca  No se moje la herida  No cambie el vendaje por 50 horas o según las indicaciones  Usted puede cambiar gonzalez vendaje antes de las 48 horas en claudette que se moje o se ensucie   Si gonzalez herida [employer] , retire y cambie Ken Christian según las indicaciones  Cubra el área con un vendaje según las indicaciones  Aplique presión firme y directa por 5 a 10 minutos si madden herida sangra  Use andreea gaza limpia o toalla para aplicar la presión  Baño:  Pregunte a madden médico cuándo se puede mojar la herida  Cuando lo autorice, lave cuidadosamente alrededor de la herida con [place] y agua  Deje correr agua y jabón sobre madden herida  No frote madden herida  Seque el área y póngale vendajes nuevos y limpios roberto le indicaron  Acuda a shamar consultas de control con madden médico según le indicaron  Usted necesitaría regresar en 48 horas para que le revisen la herida por signos de infección  Usted podría necesitar andreea radiografía, ultrasonido o un estudio de imágenes por tomografía para asegurarse de que el objeto extraño se lo benson retirado del todo  Anote shamar preguntas para que se acuerde de hacerlas pavan shamar visitas  © 2017 [address]  Information is for End User's use only and may not be sold, redistributed or otherwise used for commercial purposes  All illustrations and images included in CareNotes® are the copyrighted property of A D A M , Inc  or Chance Joe  Esta información es sólo para uso en educación  Madden intención no es darle un consejo médico sobre enfermedades o tratamientos  Colsulte con madden Suzen Gulliver farmacéutico antes de seguir cualquier régimen médico para saber si es seguro y efectivo para usted
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
## 2018-09-09 ENCOUNTER — APPOINTMENT (EMERGENCY)
Dept: RADIOLOGY | Facility: HOSPITAL | Age: 33
End: 2018-09-09
Payer: MEDICARE

## 2018-09-09 ENCOUNTER — HOSPITAL ENCOUNTER (EMERGENCY)
Facility: HOSPITAL | Age: 33
Discharge: HOME | End: 2018-09-09
Attending: EMERGENCY MEDICINE | Admitting: EMERGENCY MEDICINE
Payer: MEDICARE

## 2018-09-09 VITALS
OXYGEN SATURATION: 98 % | TEMPERATURE: 98.1 F | SYSTOLIC BLOOD PRESSURE: 131 MMHG | HEART RATE: 88 BPM | RESPIRATION RATE: 16 BRPM | DIASTOLIC BLOOD PRESSURE: 77 MMHG

## 2018-09-09 DIAGNOSIS — M54.50 LOWER BACK PAIN: ICD-10-CM

## 2018-09-09 DIAGNOSIS — J45.909 ASTHMA: Primary | ICD-10-CM

## 2018-09-09 LAB — EXT PREG TEST URINE: NORMAL

## 2018-09-09 PROCEDURE — 99283 EMERGENCY DEPT VISIT LOW MDM: CPT

## 2018-09-09 PROCEDURE — 94640 AIRWAY INHALATION TREATMENT: CPT

## 2018-09-09 PROCEDURE — 71046 X-RAY EXAM CHEST 2 VIEWS: CPT

## 2018-09-09 PROCEDURE — 81025 URINE PREGNANCY TEST: CPT | Performed by: PHYSICIAN ASSISTANT

## 2018-09-09 RX ORDER — PREDNISONE 20 MG/1
60 TABLET ORAL ONCE
Status: COMPLETED | OUTPATIENT
Start: 2018-09-09 | End: 2018-09-09

## 2018-09-09 RX ORDER — IPRATROPIUM BROMIDE AND ALBUTEROL SULFATE 2.5; .5 MG/3ML; MG/3ML
3 SOLUTION RESPIRATORY (INHALATION)
Status: DISCONTINUED | OUTPATIENT
Start: 2018-09-09 | End: 2018-09-09 | Stop reason: HOSPADM

## 2018-09-09 RX ORDER — ALBUTEROL SULFATE 90 UG/1
2 AEROSOL, METERED RESPIRATORY (INHALATION) EVERY 6 HOURS PRN
Qty: 1 INHALER | Refills: 0 | Status: SHIPPED | OUTPATIENT
Start: 2018-09-09 | End: 2019-09-09

## 2018-09-09 RX ORDER — ALBUTEROL SULFATE 1.25 MG/3ML
1 SOLUTION RESPIRATORY (INHALATION) EVERY 6 HOURS PRN
Qty: 75 ML | Refills: 0 | Status: SHIPPED | OUTPATIENT
Start: 2018-09-09 | End: 2019-09-09

## 2018-09-09 RX ORDER — PREDNISONE 20 MG/1
20 TABLET ORAL 2 TIMES DAILY WITH MEALS
Qty: 10 TABLET | Refills: 0 | Status: SHIPPED | OUTPATIENT
Start: 2018-09-09 | End: 2018-09-14

## 2018-09-09 RX ORDER — ACETAMINOPHEN 325 MG/1
650 TABLET ORAL ONCE
Status: DISCONTINUED | OUTPATIENT
Start: 2018-09-09 | End: 2018-09-09 | Stop reason: HOSPADM

## 2018-09-09 RX ADMIN — PREDNISONE 60 MG: 20 TABLET ORAL at 09:25

## 2018-09-09 RX ADMIN — IPRATROPIUM BROMIDE AND ALBUTEROL SULFATE 3 ML: .5; 3 SOLUTION RESPIRATORY (INHALATION) at 09:22

## 2018-09-09 NOTE — ED NOTES
[employer] phone used for med admin, patient very agitated reporting that she does not want Tylenol or neb at this time, reports that she needs something more for her back pain  State she has been taking Tylenol and Motrin at home for back pain and it is not helping  Patient also reports she has nebs at home  RN explains the reason for the neb and the steroids  Explains that patient has right to refuse and she will let MD know  Patient agreeable at this time to take the steroids and the neb  Refuses Tylenol   Brittany Cannon PA-C aware     Joe Strickland, BHARATH  09/09/18 3309

## 2018-09-09 NOTE — DISCHARGE INSTRUCTIONS
Dolor katelyn de espalda inferior   LO QUE NECESITA SABER:   El dolor katelyn de la región lumbar de la espalda es andreea molestia repentina en la parte inferior de gonzalez espalda que dura hasta por 6 semanas  La molestia hace que sea dificil que usted tolere la [place]  INSTRUCCIONES SOBRE [facility]:   Regrese a la hailey de emergencias si:   · Usted tiene dolor intenso  · Usted repentinamente tiene rigidez o siente pesadez en ambos glúteos hacia abajo de ambas piernas  · Usted tiene entumecimiento o debilidad en andreea pierna o dolor en ambas piernas  · Usted tiene entumecimiento en el área genital o en la región lumbar  · Usted no puede controlar gonzalez orina ni shamar deposiciones intestinales  Pregúntele a gonzalez Raphael Forester vitaminas y minerales son adecuados para usted  · Usted tiene fiebre  · Usted tiene un dolor por la noche o cuando descansa  · Gonzalez dolor no mejora con el tratamiento  · Usted tiene dolor que empeora cuando tose o estornuda  · Usted siente un estallido o chasquido repentino en gonzalez espalda  · Usted tiene preguntas o inquietudes acerca de gonzalez condición o cuidado  Medicamentos:  Los siguientes medicamentos pueden  ser recetados por gonzalez médico:  · El acetaminofén  kathy el dolor  Está disponible sin receta médica  Pregunte la cantidad y la frecuencia con que debe tomarlos  [address]  El acetaminofén puede causar daño en el hígado cuando no se mark de forma correcta  · AINEs (Analgésicos antiinflamatorios no esteroides)  ayudan a disminuir la inflamación y el dolor  Aisha medicamento esta disponible con o sin andreea receta médica  Los AINEs pueden causar sangrado estomacal o problemas renales en ciertas personas  Si usted mark un medicamento anticoagulante, siempre pregúntele a gonzalez médico si los SIMIN son seguros para usted  Siempre sixto la etiqueta de aisha medicamento y [place] instrucciones  · Un medicamento con receta para el dolor  podrían ser Gerhardt Libra  Pregunte al médico cómo debe elle aisha medicamento de forma hayden  · Relajantes musculares  disminuyen el dolor y [employer] músculos de la parte inferior de la columna  · [place] shamar medicamentos roberto se le haya indicado  Consulte con gonzalez médico si usted fitz que gonzalez medicamento no le está ayudando o si presenta efectos secundarios  Infórmele si es alérgico a algún medicamento  Mantenga andreea lista actualizada de los [place], las vitaminas y los productos herbales que mark  Incluya los siguientes datos de los medicamentos: cantidad, frecuencia y motivo de administración  Traiga con usted la lista o los envases de la píldoras a shamar citas de seguimiento  Lleve la lista de los medicamentos con usted en claudette de andreea emergencia  Cuidados personales:   · Manténgase activo  lo más que pueda sin causar más dolor  El reposo en cama puede empeorar gonzalez dolor de espalda  Comience con ejercicios ligeros roberto caminar  Evite levantar objetos hasta que ya no tenga dolor  Solicite más información acerca de las actividades físicas o plan de ejercicios que son los adecuados para usted  · El hielo  ayuda a disminuir la inflamación, el dolor y los espasmos musculares  Ponga hielo vinny en andreea bolsa plástica  Cúbrala con andreea toalla  Aplíquela en gonzalez jorge lumbar por 20 a 30 minutos cada 2 horas  Sukh esto por 2 a 3 días después que el dolor empiece, o según lo indicado  · El calor  ayuda a disminuir dolor y espasmos musculares  Empiece a utilizar calor después de jair terminado el tratamiento con el hielo  Utilice andreea toalla pequeña empapada con [place], andreea almohada térmica o tome un baño de madelyn con agua tibia  Aplíquese calor en el área lesionada pavan 20 a 30 minutos cada 2 horas pavan la cantidad de [employer] indiquen  [place] entre el calor y el hielo  Prevenir el dolor katelyn de la parte inferior de la espalda:   · Use la mecánica corporal adecuada        ¨ Flexione la cadera y las rodillas cuando Xavier Hoof a levantar un objeto  No doble la cintura  Utilice los [employer] de las piernas mientras levanta madden carga  No use madden espalda  Mantenga el objeto cerca de madden pecho mientras lo levanta  No se tuerza, ni levante cualquier cosa por encima de madden cintura  ¨ Cambie madden posición frecuentemente cuando pase mucho tiempo de pie  Descanse un pie sobre andreea Jed Sara o un reposapiés e intercambie con el otro pie frecuentemente  ¨ No permanezca sentado por lapsos de tiempo prolongados  Cuando sea necesario hacerlo, siéntese en andreea silla de respaldo recto con los pies apoyados en el suelo  Nunca alcance, jale ni empuje mientras se encuentra sentando  · Sukh ejercicios que fortalezcan shamar músculos de la espalda  Entre en calor antes de hacer ejercicio  Consulte con madden médico sobre [employer] plan de ejercicios para usted  · Mantenga un peso saludable  Consulte con madden médico cuánto debería pesar  Pida que le ayude a crear un plan para bajar de peso si usted tiene sobrepeso  Acuda a shamar consultas de control con madden médico según le indicaron  Regrese a andreea karson de seguimiento si usted aun tiene [employer] de 1 a 3 semanas de [place]  Puede que usted necesite acudir con un ortopedista si madden dolor de espalda dura más de 12 semanas  Anote shamar preguntas para que se acuerde de hacerlas pavan shamar visitas  © 2017 [address] Information is for End User's use only and may not be sold, redistributed or otherwise used for commercial purposes  All illustrations and images included in CareNotes® are the copyrighted property of A D A M , Inc  or Chance Joe  Esta información es sólo para uso en educación  Madden intención no es darle un consejo médico sobre enfermedades o tratamientos  Colsulte con madden [place] farmacéutico antes de seguir cualquier régimen médico para saber si es seguro y efectivo para usted      Asma, cuidados ambulatorios   INFORMACIÓN GENERAL:   El asma  es andreea enfermedad pulmonar que dificulta la respiración  La inflamación crónica y las reacciones a los factores desencadenantes estrechan las vías respiratorias en shamar pulmones  El asma puede ser mortal si no se controla  Los síntomas comunes incluyen los siguientes:   · Tos     · Sibilancias     · Falta de aliento     · Opresión en el pecho  Busque cuidados inmediatos para los siguientes síntomas:   · Falta de aliento severa    · Labios o uñas azules o grises    · La piel alrededor de gonzalez seth y costillas se hunde con cada respiro    · Falta de [place], aún después de tomarse shamar medicamentos de uso a corto plazo según shamar indicaciones    · Las lecturas numéricas del medidor de gonzalez flujo jonathan están en la jorge lila de gonzalez plan de acción para el asma  El tratamiento para el asma  dependerá de gonzalez severidad  Es posible que los medicamentos disminuyan la inflamación, volodymyr las vías respiratorias y faciliten gonzalez respiración  Los medicamentos pueden inhalarse, tomarse en pastilla o [place]  Los [employer] de uso a corto plazo alivian shamar síntomas rápidamente  Los medicamentos a cayetano plazo se usan para prevenir ataques futuros  Puede ser que usted también necesite medicamentos para ayudar a controlar shamar alergias  Hedda [place] y prevenga futuros ataques de asma:   · Siga gonzalez plan de acción para el asma  Jeanie es un plan escrito que usted y gonzalez médico [place] Andrea  Jeanie explica cual medicamento usted necesita y si es necesario, y cuando cambiar la dosis  También explica cómo controlar los síntomas y utilizar un medidor de flujo jonathan (alto)  El medidor mide qué tan balta están funcionando los pulmones  · [address] , roberto las [employer], el reflujo estomacal y la apnea de sueño  · Identifique y evite los factores desencadenantes  Estos pueden [employer], los ácaros del [place], el moho y las cucarachas  · No fume y evite a los fumadores  Si usted fuma, nunca es tarde para dejar de hacerlo  Consulte con gonzalez médico si necesita ayuda para dejar de fumar  · Consulte sobre la vacuna contra la gripe  La gripe puede empeorar gonzalez asma  Es posible que necesite de andreea vacuna contra la gripe cada año  Programe andreea karson de seguimiento con gonzalez proveedor de eusebia según indicaciones:  Usted tendrá que regresar para asegurarse de que gonzalez medicamento esté funcionando y shamar síntomas están controlados  Es probable que a usted lo refieran a un especialista en asma o alergias  Seguramente le pedirán que anote los valores numéricos de gonzalez medidor del flujo jonathan para que los lleve a shamar citas de seguimiento  Anote shamar preguntas para que las recuerde pavan shamar citas de seguimiento  ACUERDOS SOBRE GONZALEZ CUIDADO:   Usted tiene el derecho de participar en la planificación de gonzalez cuidado  Aprenda todo lo que pueda sobre gonzalez condición y roberto darle tratamiento  Discuta con shamar médicos shamar opciones de tratamiento para juntos decidir el cuidado que usted quiere recibir  Usted siempre tiene el derecho a rechazar gonzalez tratamiento  Esta información es sólo para uso en educación  Gonzalez intención no es darle un consejo médico sobre enfermedades o tratamientos  Colsulte con gonzalez Stormy Binder farmacéutico antes de seguir cualquier régimen médico para saber si es seguro y efectivo para usted  © 2014 [address] is for End User's use only and may not be sold, redistributed or otherwise used for commercial purposes  All illustrations and images included in CareNotes® are the copyrighted property of A D A M , Inc  or Chance Joe

## 2018-09-09 NOTE — ED PROVIDER NOTES
History  Chief Complaint   Patient presents with    Asthma     Reports asthma and cough x 4 days  Reports pain with cough   Back Pain     Pt reports generalized back pain, reports lifting for work  Patient is a 59-year-old female presents for evaluation asthma  Patient is Indonesian-speaking only and history was obtained from the   She is complaining persistent asthma despite using her rescue inhaler  She is complaining of cough and lower back pain  She notes that the lower back pain is related to frequent and heavy lifting at her job  She reports intermittent episodes of pain into right hip and posterior thigh from the right side of her back  She is also complaining asthma and has run the albuterol for her nebulizer at home  She is asking for refills  She complains of wheezing cough typical for her asthma symptoms            Prior to Admission Medications   Prescriptions Last Dose Informant Patient Reported? Taking?   acetaminophen (TYLENOL) 500 mg tablet   No No   Sig: Take 1 tablet (500 mg total) by mouth every 6 (six) hours as needed for mild pain, moderate pain, headaches or fever   ibuprofen (MOTRIN) 400 mg tablet   No No   Sig: Take 1 tablet (400 mg total) by mouth every 6 (six) hours as needed for mild pain      Facility-Administered Medications: None       Past Medical History:   Diagnosis Date    Asthma     Psychiatric disorder        History reviewed  No pertinent surgical history  History reviewed  No pertinent family history  I have reviewed and agree with the history as documented  Social History   Substance Use Topics    Smoking status: Current Every Day Smoker     Types: Cigarettes    Smokeless tobacco: Never Used    Alcohol use Yes      Comment: socially         Review of Systems   Constitutional: Negative for activity change, appetite change and fatigue  HENT: Negative for nosebleeds, sneezing, sore throat, trouble swallowing and voice change      Eyes: Negative for photophobia, pain and visual disturbance  Respiratory: Positive for cough and wheezing  Negative for apnea, choking and stridor  Cardiovascular: Negative for palpitations and leg swelling  Gastrointestinal: Negative for anal bleeding and constipation  Endocrine: Negative for cold intolerance, heat intolerance, polydipsia and polyphagia  Genitourinary: Negative for decreased urine volume, enuresis, frequency, genital sores and urgency  Musculoskeletal: Negative for joint swelling and myalgias  Allergic/Immunologic: Negative for environmental allergies and food allergies  Neurological: Negative for tremors, seizures, speech difficulty and weakness  Hematological: Negative for adenopathy  Psychiatric/Behavioral: Negative for behavioral problems, decreased concentration, dysphoric mood and hallucinations  Physical Exam  Physical Exam   Constitutional: She is oriented to person, place, and time  She appears well-developed and well-nourished  No distress  HENT:   Head: Normocephalic and atraumatic  Right Ear: External ear normal    Left Ear: External ear normal    Nose: Nose normal    Mouth/Throat: Oropharynx is clear and moist    Eyes: Conjunctivae and EOM are normal  Pupils are equal, round, and reactive to light  Neck: Normal range of motion  Neck supple  Cardiovascular: Normal rate, regular rhythm and normal heart sounds  Exam reveals no gallop and no friction rub  No murmur heard  Pulmonary/Chest: Effort normal  No respiratory distress  She has wheezes  Abdominal: Soft  Bowel sounds are normal    Musculoskeletal: She exhibits tenderness  She exhibits no deformity  Neurological: She is alert and oriented to person, place, and time  No cranial nerve deficit  Skin: Skin is warm and dry  She is not diaphoretic  Psychiatric: She has a normal mood and affect  Her behavior is normal    Vitals reviewed        Vital Signs  ED Triage Vitals [09/09/18 0812] Temperature Pulse Respirations Blood Pressure SpO2   98 1 °F (36 7 °C) 88 16 131/77 98 %      Temp Source Heart Rate Source Patient Position - Orthostatic VS BP Location FiO2 (%)   Oral Monitor Sitting Right arm --      Pain Score       9           Vitals:    09/09/18 0812   BP: 131/77   Pulse: 88   Patient Position - Orthostatic VS: Sitting       Visual Acuity      ED Medications  Medications   ipratropium-albuterol (DUO-NEB) 0 5-2 5 mg/3 mL inhalation solution 3 mL (3 mL Nebulization Given 9/9/18 0922)   acetaminophen (TYLENOL) tablet 650 mg (650 mg Oral Not Given 9/9/18 0924)   predniSONE tablet 60 mg (60 mg Oral Given 9/9/18 0925)       Diagnostic Studies  Results Reviewed     Procedure Component Value Units Date/Time    POCT pregnancy, urine [[id number]]  (Normal) Resulted:  09/09/18 0920    Lab Status:  Final result Updated:  09/09/18 0947     EXT PREG TEST UR (Ref: Negative) NEG                 XR chest 2 views   Final Result by Emeli Martínez MD (09/09 1009)      No acute cardiopulmonary disease  Workstation performed: [id number]                    Procedures  Procedures       Phone Contacts  ED Phone Contact    ED Course                               MDM  CritCare Time    Disposition  Final diagnoses:   Asthma   Lower back pain     Time reflects when diagnosis was documented in both MDM as applicable and the Disposition within this note     Time User Action Codes Description Comment    9/9/2018 10:19 AM Reinaldo Cotto Add [J45 909] Asthma     9/9/2018 10:19 AM Reinaldo Cotto Add [M54 5] Lower back pain       ED Disposition     ED Disposition Condition Comment    Discharge  Nuria Garcia discharge to home/self care      Condition at discharge: Stable        Follow-up Information     Follow up With Specialties Details Why [address] [facility] [address] Family Medicine Schedule an appointment as soon as possible for a visit  [address] [address]  [phone]            Patient's Medications   Discharge Prescriptions    ALBUTEROL (ACCUNEB) 1 25 MG/3ML NEBULIZER SOLUTION    Take 3 mL (1 25 mg total) by nebulization every 6 (six) hours as needed for wheezing       Start Date: 9/9/2018  End Date: 9/9/2019       Order Dose: 1 25 mg       Quantity: 75 mL    Refills: 0    ALBUTEROL (PROVENTIL HFA,VENTOLIN HFA) 90 MCG/ACT INHALER    Inhale 2 puffs every 6 (six) hours as needed for wheezing       Start Date: 9/9/2018  End Date: 9/9/2019       Order Dose: 2 puffs       Quantity: 1 Inhaler    Refills: 0    PREDNISONE 20 MG TABLET    Take 1 tablet (20 mg total) by mouth 2 (two) times a day with meals for 5 days       Start Date: 9/9/2018  End Date: 9/14/2018       Order Dose: 20 mg       Quantity: 10 tablet    Refills: 0     No discharge procedures on file      ED Provider  Electronically Signed by           Alfonzo Beavers PA-C  09/09/18 9830

## 2018-09-09 NOTE — ED NOTES
Patient also reporting that she will leave and go to another hospital if she is not given something other than Tylenol for pain   MD miriam Green RN  09/09/18 4198

## 2018-09-23 ENCOUNTER — HOSPITAL ENCOUNTER (EMERGENCY)
Facility: HOSPITAL | Age: 33
Discharge: HOME | End: 2018-09-24
Attending: EMERGENCY MEDICINE
Payer: MEDICARE

## 2018-09-23 ENCOUNTER — APPOINTMENT (EMERGENCY)
Dept: CT IMAGING | Facility: HOSPITAL | Age: 33
End: 2018-09-23
Payer: MEDICARE

## 2018-09-23 VITALS
SYSTOLIC BLOOD PRESSURE: 119 MMHG | DIASTOLIC BLOOD PRESSURE: 75 MMHG | HEART RATE: 78 BPM | OXYGEN SATURATION: 98 % | RESPIRATION RATE: 16 BRPM | TEMPERATURE: 98.7 F

## 2018-09-23 DIAGNOSIS — R10.9 ACUTE RIGHT FLANK PAIN: Primary | ICD-10-CM

## 2018-09-23 LAB
ALBUMIN SERPL BCP-MCNC: 3.7 G/DL (ref 3.5–5)
ALP SERPL-CCNC: 103 U/L (ref 46–116)
ALT SERPL W P-5'-P-CCNC: 29 U/L (ref 12–78)
ANION GAP SERPL CALCULATED.3IONS-SCNC: 10 MMOL/L (ref 4–13)
AST SERPL W P-5'-P-CCNC: 26 U/L (ref 5–45)
BACTERIA UR QL AUTO: ABNORMAL /HPF
BASOPHILS # BLD AUTO: 0.06 THOUSANDS/ΜL (ref 0–0.1)
BASOPHILS NFR BLD AUTO: 1 % (ref 0–1)
BILIRUB SERPL-MCNC: 0.35 MG/DL (ref 0.2–1)
BILIRUB UR QL STRIP: NEGATIVE
BUN SERPL-MCNC: 11 MG/DL (ref 5–25)
CALCIUM SERPL-MCNC: 8.7 MG/DL (ref 8.3–10.1)
CHLORIDE SERPL-SCNC: 104 MMOL/L (ref 100–108)
CLARITY UR: CLEAR
CO2 SERPL-SCNC: 25 MMOL/L (ref 21–32)
COLOR UR: YELLOW
CREAT SERPL-MCNC: 0.64 MG/DL (ref 0.6–1.3)
EOSINOPHIL # BLD AUTO: 0.03 THOUSAND/ΜL (ref 0–0.61)
EOSINOPHIL NFR BLD AUTO: 0 % (ref 0–6)
ERYTHROCYTE [DISTWIDTH] IN BLOOD BY AUTOMATED COUNT: 13.2 % (ref 11.6–15.1)
EXT PREG TEST URINE: NEGATIVE
GFR SERPL CREATININE-BSD FRML MDRD: 116 ML/MIN/1.73SQ M
GLUCOSE SERPL-MCNC: 127 MG/DL (ref 65–140)
GLUCOSE UR STRIP-MCNC: NEGATIVE MG/DL
HCT VFR BLD AUTO: 37.3 % (ref 34.8–46.1)
HGB BLD-MCNC: 12.3 G/DL (ref 11.5–15.4)
HGB UR QL STRIP.AUTO: NORMAL
IMM GRANULOCYTES # BLD AUTO: 0.03 THOUSAND/UL (ref 0–0.2)
IMM GRANULOCYTES NFR BLD AUTO: 0 % (ref 0–2)
KETONES UR STRIP-MCNC: NEGATIVE MG/DL
LEUKOCYTE ESTERASE UR QL STRIP: NEGATIVE
LIPASE SERPL-CCNC: 103 U/L (ref 73–393)
LYMPHOCYTES # BLD AUTO: 1.4 THOUSANDS/ΜL (ref 0.6–4.47)
LYMPHOCYTES NFR BLD AUTO: 15 % (ref 14–44)
MCH RBC QN AUTO: 29.1 PG (ref 26.8–34.3)
MCHC RBC AUTO-ENTMCNC: 33 G/DL (ref 31.4–37.4)
MCV RBC AUTO: 88 FL (ref 82–98)
MONOCYTES # BLD AUTO: 0.45 THOUSAND/ΜL (ref 0.17–1.22)
MONOCYTES NFR BLD AUTO: 5 % (ref 4–12)
NEUTROPHILS # BLD AUTO: 7.14 THOUSANDS/ΜL (ref 1.85–7.62)
NEUTS SEG NFR BLD AUTO: 79 % (ref 43–75)
NITRITE UR QL STRIP: NEGATIVE
NON-SQ EPI CELLS URNS QL MICRO: ABNORMAL /HPF
NRBC BLD AUTO-RTO: 0 /100 WBCS
PH UR STRIP.AUTO: 6.5 [PH] (ref 4.5–8)
PLATELET # BLD AUTO: 336 THOUSANDS/UL (ref 149–390)
PMV BLD AUTO: 9.9 FL (ref 8.9–12.7)
POTASSIUM SERPL-SCNC: 4.4 MMOL/L (ref 3.5–5.3)
PROT SERPL-MCNC: 7.7 G/DL (ref 6.4–8.2)
PROT UR STRIP-MCNC: NEGATIVE MG/DL
RBC # BLD AUTO: 4.22 MILLION/UL (ref 3.81–5.12)
RBC #/AREA URNS AUTO: ABNORMAL /HPF
SODIUM SERPL-SCNC: 139 MMOL/L (ref 136–145)
SP GR UR STRIP.AUTO: 1.02 (ref 1–1.03)
UROBILINOGEN UR QL STRIP.AUTO: 1 E.U./DL
WBC # BLD AUTO: 9.11 THOUSAND/UL (ref 4.31–10.16)
WBC #/AREA URNS AUTO: ABNORMAL /HPF

## 2018-09-23 PROCEDURE — 83690 ASSAY OF LIPASE: CPT | Performed by: EMERGENCY MEDICINE

## 2018-09-23 PROCEDURE — 96360 HYDRATION IV INFUSION INIT: CPT

## 2018-09-23 PROCEDURE — 81001 URINALYSIS AUTO W/SCOPE: CPT | Performed by: EMERGENCY MEDICINE

## 2018-09-23 PROCEDURE — 80053 COMPREHEN METABOLIC PANEL: CPT | Performed by: EMERGENCY MEDICINE

## 2018-09-23 PROCEDURE — 99284 EMERGENCY DEPT VISIT MOD MDM: CPT

## 2018-09-23 PROCEDURE — 74176 CT ABD & PELVIS W/O CONTRAST: CPT

## 2018-09-23 PROCEDURE — 85025 COMPLETE CBC W/AUTO DIFF WBC: CPT | Performed by: EMERGENCY MEDICINE

## 2018-09-23 PROCEDURE — 36415 COLL VENOUS BLD VENIPUNCTURE: CPT | Performed by: EMERGENCY MEDICINE

## 2018-09-23 PROCEDURE — 81025 URINE PREGNANCY TEST: CPT | Performed by: EMERGENCY MEDICINE

## 2018-09-23 RX ORDER — ONDANSETRON 2 MG/ML
4 INJECTION INTRAMUSCULAR; INTRAVENOUS ONCE
Status: DISCONTINUED | OUTPATIENT
Start: 2018-09-23 | End: 2018-09-24 | Stop reason: HOSPADM

## 2018-09-23 RX ORDER — NAPROXEN 500 MG/1
500 TABLET ORAL 2 TIMES DAILY WITH MEALS
Qty: 10 TABLET | Refills: 0 | Status: SHIPPED | OUTPATIENT
Start: 2018-09-23 | End: 2019-08-15

## 2018-09-23 RX ORDER — KETOROLAC TROMETHAMINE 30 MG/ML
15 INJECTION, SOLUTION INTRAMUSCULAR; INTRAVENOUS ONCE
Status: DISCONTINUED | OUTPATIENT
Start: 2018-09-23 | End: 2018-09-24 | Stop reason: HOSPADM

## 2018-09-23 RX ADMIN — SODIUM CHLORIDE 1000 ML: 0.9 INJECTION, SOLUTION INTRAVENOUS at 23:01

## 2018-09-24 NOTE — DISCHARGE INSTRUCTIONS
Dolor abdominal, cuidados ambulatorios   INFORMACIÓN GENERAL:   El dolor abdominal  puede ser sordo, molesto o [place]  Puede sentir dolor en andreea jorge del abdomen o en todo el abdomen  El dolor puede deberse a ciertos estados roberto estreñimiento, sensibilidad o intoxicación alimentaria, infección o andreea obstrucción  Asimismo, el dolor abdominal puede deberse a andreea hernia, apendicitis o úlcera  La causa del dolor abdominal puede ser desconocida  Busque cuidados inmediatos para los siguientes síntomas:   · [place] dolor de pecho o falta de aliento    · Dolor pulsátil en el abdomen superior o en la parte inferior de la espalda que de repente es zarina    · Dolor que se localiza en el abdomen inferior derecho y empeora con el movimiento    · Fiebre por encima de 100 4°F (38°C) o escalofríos lauryn    · Vómito de todo lo que usted come y mark    · Dolor que no mejora o más balta empeora pavan las siguientes 8 a 12 horas    · [place] en el vómito o heces que se charlie negras y alquitranadas    · La piel o el berman de los ojos se vuelven amarillentos    · Grandes cantidades de sangrado vaginal que no es perry periodo menstrual  El tratamiento para el dolor abdominal  puede llegar a incluir medicamentos para calmar perry estómago, prevenir el vómito o disminuir el dolor  Programe andreea karson con perry proveedor de [employer] se le haya indicado: Anote shamar preguntas para que se acuerde de hacerlas pavan shamar visitas  ACUERDOS SOBRE PERRY CUIDADO:   Usted tiene el derecho de participar en la planificación de perry cuidado  Aprenda todo lo que pueda sobre perry condición y roberto darle tratamiento  Discuta con shamar médicos shamar opciones de tratamiento para juntos decidir el cuidado que usted quiere recibir  Usted siempre tiene el derecho a rechazar perry tratamiento  Esta información es sólo para uso en educación  Perry intención no es darle un consejo médico sobre enfermedades o tratamientos   Colsulte con perry Len Ahle farmacéutico antes de seguir cualquier régimen médico para saber si es seguro y efectivo para usted  © 2014 [address] is for End User's use only and may not be sold, redistributed or otherwise used for commercial purposes  All illustrations and images included in CareNotes® are the copyrighted property of A D A M , Inc  or Chance Joe

## 2018-09-24 NOTE — ED PROVIDER NOTES
History  Chief Complaint   Patient presents with    Back Pain     B/L lower back pain with radiation to R leg, up to R arm  No known injury  HPI     73-year-old female with history of intermittent asthma never remitted never intubated presenting with chief complaint of right flank pain  Patient states has been coming going for number of weeks  Radiates to right lower quadrant  Patient states that the pain is a colicky coming and going sharp pain  Currently pain is a 5/10  Patient admits to malodorous urine  Patient admits to urinary frequency without dysuria  Patient states she has been seen multiple times and has been given analgesia with no relief  Patient does admit to chronic lower back pain  No saddle anesthesias, no urinary retention or incontinence no fecal incontinence no numbness or tingling  Patient denies fever chills rigors headache lightheadedness dizziness chest pain palpitations shortness of breath cough pleurisy nausea vomiting diarrhea constipation motor weakness numbness and tingling  [place] to [place]  was used during this encounter  Prior to Admission Medications   Prescriptions Last Dose Informant Patient Reported? Taking?   acetaminophen (TYLENOL) 500 mg tablet   No Yes   Sig: Take 1 tablet (500 mg total) by mouth every 6 (six) hours as needed for mild pain, moderate pain, headaches or fever   albuterol (ACCUNEB) 1 25 MG/3ML nebulizer solution   No Yes   Sig: Take 3 mL (1 25 mg total) by nebulization every 6 (six) hours as needed for wheezing   albuterol (PROVENTIL HFA,VENTOLIN HFA) 90 mcg/act inhaler   No Yes   Sig: Inhale 2 puffs every 6 (six) hours as needed for wheezing      Facility-Administered Medications: None       Past Medical History:   Diagnosis Date    Asthma     Psychiatric disorder        History reviewed  No pertinent surgical history  History reviewed  No pertinent family history    I have reviewed and agree with the history as documented  Social History   Substance Use Topics    Smoking status: Current Every Day Smoker     Types: Cigarettes    Smokeless tobacco: Never Used    Alcohol use Yes      Comment: socially         Review of Systems   Constitutional: Negative for chills, fatigue and fever  Genitourinary: Positive for flank pain and urgency  Negative for decreased urine volume, difficulty urinating, dyspareunia, dysuria, enuresis, frequency, genital sores, hematuria, menstrual problem, pelvic pain, vaginal bleeding, vaginal discharge and vaginal pain  All other systems reviewed and are negative  Physical Exam  ED Triage Vitals [09/23/18 2141]   Temperature Pulse Respirations Blood Pressure SpO2   98 7 °F (37 1 °C) 93 18 118/67 99 %      Temp Source Heart Rate Source Patient Position - Orthostatic VS BP Location FiO2 (%)   Temporal Monitor Sitting Right arm --      Pain Score       9           Orthostatic Vital Signs  Vitals:    09/23/18 2141 09/23/18 2300   BP: 118/67 119/75   Pulse: 93 78   Patient Position - Orthostatic VS: Sitting Lying       Physical Exam   Constitutional: She is oriented to person, place, and time  She appears well-developed and well-nourished  No distress  HENT:   Head: Normocephalic and atraumatic  Right Ear: External ear normal    Left Ear: External ear normal    Nose: Nose normal    Mouth/Throat: Oropharynx is clear and moist  No oropharyngeal exudate  Eyes: Conjunctivae and EOM are normal  Pupils are equal, round, and reactive to light  Right eye exhibits no discharge  Left eye exhibits no discharge  No scleral icterus  Neck: Normal range of motion  Neck supple  No JVD present  No tracheal deviation present  No thyromegaly present  Cardiovascular: Normal rate, regular rhythm, normal heart sounds and intact distal pulses  No murmur heard  Pulmonary/Chest: Effort normal and breath sounds normal  No stridor  No respiratory distress  She has no wheezes  Abdominal: Soft   Bowel sounds are normal  She exhibits no distension and no mass  There is tenderness in the right lower quadrant  There is CVA tenderness (right)  There is no rebound and no guarding  No hernia  Musculoskeletal: Normal range of motion  She exhibits no edema, tenderness or deformity  Lymphadenopathy:     She has no cervical adenopathy  Neurological: She is alert and oriented to person, place, and time  She displays normal reflexes  No cranial nerve deficit  She exhibits normal muscle tone  Skin: Skin is warm and dry  No rash noted  She is not diaphoretic  No erythema  Psychiatric: She has a normal mood and affect  Her behavior is normal  Judgment and thought content normal    Nursing note and vitals reviewed  ED Medications  Medications   ketorolac (TORADOL) injection 15 mg (15 mg Intravenous Not Given 9/23/18 2302)   ondansetron (ZOFRAN) injection 4 mg (4 mg Intravenous Not Given 9/23/18 2301)   sodium chloride 0 9 % bolus 1,000 mL (0 mL Intravenous Stopped 9/23/18 2355)       Diagnostic Studies  Results Reviewed     Procedure Component Value Units Date/Time    Comprehensive metabolic panel [[id number]] Collected:  09/23/18 2253    Lab Status:  Final result Specimen:  Blood from Arm, Right Updated:  09/23/18 2320     Sodium 139 mmol/L      Potassium 4 4 mmol/L      Chloride 104 mmol/L      CO2 25 mmol/L      ANION GAP 10 mmol/L      BUN 11 mg/dL      Creatinine 0 64 mg/dL      Glucose 127 mg/dL      Calcium 8 7 mg/dL      AST 26 U/L      ALT 29 U/L      Alkaline Phosphatase 103 U/L      Total Protein 7 7 g/dL      Albumin 3 7 g/dL      Total Bilirubin 0 35 mg/dL      eGFR 116 ml/min/1 73sq m     Narrative:         National Kidney Disease Education Program recommendations are as follows:  GFR calculation is accurate only with a steady state creatinine  Chronic Kidney disease less than 60 ml/min/1 73 sq  meters  Kidney failure less than 15 ml/min/1 73 sq  meters      Lipase [[id number]]  (Normal) Collected: 09/23/18 2253    Lab Status:  Final result Specimen:  Blood from Arm, Right Updated:  09/23/18 2320     Lipase 103 u/L     Urine Microscopic [[id number]]  (Abnormal) Collected:  09/23/18 2246    Lab Status:  Final result Specimen:  Urine from Urine, Clean Catch Updated:  09/23/18 2259     RBC, UA 2-4 (A) /hpf      WBC, UA None Seen /hpf      Epithelial Cells Occasional /hpf      Bacteria, UA Occasional /hpf     CBC and differential [[id number]]  (Abnormal) Collected:  09/23/18 2253    Lab Status:  Final result Specimen:  Blood from Arm, Right Updated:  09/23/18 2259     WBC 9 11 Thousand/uL      RBC 4 22 Million/uL      Hemoglobin 12 3 g/dL      Hematocrit 37 3 %      MCV 88 fL      MCH 29 1 pg      MCHC 33 0 g/dL      RDW 13 2 %      MPV 9 9 fL      Platelets 447 Thousands/uL      nRBC 0 /100 WBCs      Neutrophils Relative 79 (H) %      Immat GRANS % 0 %      Lymphocytes Relative 15 %      Monocytes Relative 5 %      Eosinophils Relative 0 %      Basophils Relative 1 %      Neutrophils Absolute 7 14 Thousands/µL      Immature Grans Absolute 0 03 Thousand/uL      Lymphocytes Absolute 1 40 Thousands/µL      Monocytes Absolute 0 45 Thousand/µL      Eosinophils Absolute 0 03 Thousand/µL      Basophils Absolute 0 06 Thousands/µL     UA w Reflex to Microscopic w Reflex to Culture [[id number]] Collected:  09/23/18 2246    Lab Status:  Final result Specimen:  Urine from Urine, Clean Catch Updated:  09/23/18 2251     Color, UA Yellow     Clarity, UA Clear     Specific [place], UA 1 020     pH, UA 6 5     Leukocytes, UA Negative     Nitrite, UA Negative     Protein, UA Negative mg/dl      Glucose, UA Negative mg/dl      Ketones, UA Negative mg/dl      Urobilinogen, UA 1 0 E U /dl      Bilirubin, UA Negative     Blood, UA Trace-Intact    POCT pregnancy, urine [[id number]]  (Normal) Resulted:  09/23/18 2246    Lab Status:  Final result Updated:  09/23/18 2247     EXT PREG TEST UR (Ref: Negative) negative                 CT renal stone study abdomen pelvis wo contrast   Final Result by Kenzie Merritt MD (09/23 4809)      No evidence of stone or hydronephrosis  No evidence of acute intra-abdominal or pelvic disease             Workstation performed: [id number]               Procedures  Procedures      Phone Consults  ED Phone Contact    ED Course  ED Course as of Sep 24 0038   Sun Sep 23, 2018   2351 Impression:      No evidence of stone or hydronephrosis  No evidence of acute intra-abdominal or pelvic disease                                     MDM  Number of Diagnoses or Management Options  Acute right flank pain:   Diagnosis management comments: 24-year-old female presenting with chief complaint of right flank pain  Patient vital signs normal limits  Patient has right CVA tenderness  Differential diagnosis include nephrolithiasis, pyelonephritis, UTI, pancreatitis  Laboratory evaluation unremarkable, CT renal stone study unremarkable  Patient felt better after anti-inflammatories  Patient will be started on naproxen  Patient referred to [place]  ED return precautions discussed  Patient agrees to follow-up care  Impression right flank pain  Urinary pregnancy test negative, doubt ectopic pregnancy  CritCare Time    Disposition  Final diagnoses:   Acute right flank pain     Time reflects when diagnosis was documented in both MDM as applicable and the Disposition within this note     Time User Action Codes Description Comment    9/23/2018 11:51 PM Kacie Solorzano 17 [R10 9] Acute right flank pain       ED Disposition     ED Disposition Condition Comment    Discharge  Mitch Carrillo discharge to home/self care  Condition at discharge: Good    Return precautions were discussed with patient  Patient understands when to return to  Emergency department  Patient agrees to discharge plan and follow up care              Follow-up Information     Follow up With Specialties Details Why Contact Info Additional [address] [facility] Go in 3 days  [address] [place] [address]  [id number]  [address] [facility] Emergency Medicine Go to As needed, If symptoms worsen [address] [facility] ED, [address]  , [place], [place], [address]          Discharge Medication List as of 9/23/2018 11:53 PM      START taking these medications    Details   naproxen (NAPROSYN) 500 mg tablet Take 1 tablet (500 mg total) by mouth 2 (two) times a day with meals for 5 days, Starting Sun 9/23/2018, Until Fri 9/28/2018, Print         CONTINUE these medications which have NOT CHANGED    Details   acetaminophen (TYLENOL) 500 mg tablet Take 1 tablet (500 mg total) by mouth every 6 (six) hours as needed for mild pain, moderate pain, headaches or fever, Starting Sun 8/12/2018, Print      albuterol (ACCUNEB) 1 25 MG/3ML nebulizer solution Take 3 mL (1 25 mg total) by nebulization every 6 (six) hours as needed for wheezing, Starting Sun 9/9/2018, Until Mon 9/9/2019, Print      albuterol (PROVENTIL HFA,VENTOLIN HFA) 90 mcg/act inhaler Inhale 2 puffs every 6 (six) hours as needed for wheezing, Starting Sun 9/9/2018, Until Mon 9/9/2019, Print           No discharge procedures on file  ED Provider  Attending physically available and evaluated [facility]  I managed the patient along with the ED Attending      Electronically Signed by         Frank Alexander DO  09/24/18 7966

## 2018-09-24 NOTE — ED ATTENDING ATTESTATION
Steve Covarrubias MD, saw and evaluated the patient  I have discussed the patient with the resident/non-physician practitioner and agree with the resident's/non-physician practitioner's findings, Plan of Care, and MDM as documented in the resident's/non-physician practitioner's note, except where noted  All available labs and Radiology studies were reviewed  At this point I agree with the current assessment done in the Emergency Department  I have conducted an independent evaluation of this patient a history and physical is as follows:    26-year-old female presents for evaluation of right lower back pain that radiates towards her right leg and up the right side of her back  No nausea, vomiting, fevers, chills saddle anesthesia, bowel/bladder complaints  Ten systems reviewed otherwise negative  On exam no acute distress, lungs normal cardiac normal, abdomen normal, back exam under palpation in right      Mode decision making;-  right low back pain-will do abdominal labs, urine dip, urine pregnancy, CT to rule out acute pathology  Critical Care Time  CritCare Time    Procedures

## 2018-11-05 ENCOUNTER — HOSPITAL ENCOUNTER (EMERGENCY)
Facility: HOSPITAL | Age: 33
Discharge: HOME | End: 2018-11-05
Attending: EMERGENCY MEDICINE
Payer: COMMERCIAL

## 2018-11-05 VITALS
SYSTOLIC BLOOD PRESSURE: 114 MMHG | OXYGEN SATURATION: 98 % | DIASTOLIC BLOOD PRESSURE: 59 MMHG | HEART RATE: 97 BPM | RESPIRATION RATE: 19 BRPM | TEMPERATURE: 97.4 F

## 2018-11-05 DIAGNOSIS — J45.909 ASTHMA: ICD-10-CM

## 2018-11-05 DIAGNOSIS — R06.02 SHORTNESS OF BREATH: Primary | ICD-10-CM

## 2018-11-05 DIAGNOSIS — Z72.0 TOBACCO USE: ICD-10-CM

## 2018-11-05 LAB — EXT PREG TEST URINE: NEGATIVE

## 2018-11-05 PROCEDURE — 81025 URINE PREGNANCY TEST: CPT | Performed by: NURSE PRACTITIONER

## 2018-11-05 PROCEDURE — 99284 EMERGENCY DEPT VISIT MOD MDM: CPT

## 2018-11-05 PROCEDURE — 94640 AIRWAY INHALATION TREATMENT: CPT

## 2018-11-05 RX ORDER — IPRATROPIUM BROMIDE AND ALBUTEROL SULFATE 2.5; .5 MG/3ML; MG/3ML
3 SOLUTION RESPIRATORY (INHALATION) ONCE
Status: COMPLETED | OUTPATIENT
Start: 2018-11-05 | End: 2018-11-05

## 2018-11-05 RX ORDER — ALBUTEROL SULFATE 90 UG/1
2 AEROSOL, METERED RESPIRATORY (INHALATION) EVERY 4 HOURS PRN
Qty: 1 INHALER | Refills: 0 | Status: SHIPPED | OUTPATIENT
Start: 2018-11-05 | End: 2020-12-23

## 2018-11-05 RX ADMIN — IPRATROPIUM BROMIDE AND ALBUTEROL SULFATE 3 ML: .5; 3 SOLUTION RESPIRATORY (INHALATION) at 05:05

## 2018-11-05 NOTE — DISCHARGE INSTRUCTIONS
You are being discharged back to work only related to your asthma, shortness of breath  The ED will not clear you to return to work status if this is related to a occupational medicine accident  You have been prescribed a ventolin MDI - use as directed  Stop smoking      Asma, cuidados ambulatorios   INFORMACIÓN GENERAL:   El asma  es andreea enfermedad pulmonar que dificulta la respiración  La inflamación crónica y las reacciones a los factores desencadenantes estrechan las vías respiratorias en shamar pulmones  El asma puede ser mortal si no se controla  Los síntomas comunes incluyen los siguientes:   · Tos     · Sibilancias     · Falta de aliento     · Opresión en el pecho  Busque cuidados inmediatos para los siguientes síntomas:   · Falta de aliento severa    · Labios o uñas azules o grises    · La piel alrededor de gonzalez seth y costillas se hunde con cada respiro    · Falta de [place], aún después de tomarse shamar medicamentos de uso a corto plazo según shamar indicaciones    · Las lecturas numéricas del medidor de gonzalez flujo jonathan están en la jorge lila de gonzalez plan de acción para el asma  El tratamiento para el asma  dependerá de gonzalez severidad  Es posible que los medicamentos disminuyan la inflamación, volodymyr las vías respiratorias y faciliten gonzalez respiración  Los medicamentos pueden inhalarse, tomarse en pastilla o [place]  Los [place] de uso a corto plazo alivian shamar síntomas rápidamente  Los medicamentos a cayetano plazo se usan para prevenir ataques futuros  Puede ser que usted también necesite medicamentos para ayudar a controlar shamar alergias  Concha Enzo y prevenga futuros ataques de asma:   · Siga gonzalez plan de acción para el asma  Jeanie es un plan escrito que usted y gonzalez médico Bessy   Jeanie explica cual medicamento usted necesita y si es necesario, y cuando cambiar la dosis  También explica cómo controlar los síntomas y utilizar un medidor de flujo jonathan (alto)   El medidor mide qué tan balta están funcionando los pulmones  · [address] , roberto las [place], el reflujo estomacal y la apnea de sueño  · Identifique y evite los factores desencadenantes  Estos pueden [employer], los ácaros del [place], el moho y las cucarachas  · No fume y evite a los fumadores  Si usted fuma, nunca es tarde para dejar de hacerlo  Consulte con perry médico si necesita ayuda para dejar de fumar  · Consulte sobre la vacuna contra la gripe  La gripe puede empeorar perry asma  Es posible que necesite de andreea vacuna contra la gripe cada año  Programe andreea karson de seguimiento con perry proveedor de eusebia según indicaciones:  Usted tendrá que regresar para asegurarse de que perry medicamento esté funcionando y shamar síntomas están controlados  Es probable que a usted lo refieran a un especialista en asma o alergias  Seguramente le pedirán que anote los valores numéricos de perry medidor del flujo jonathan para que los lleve a shamar citas de seguimiento  Anote shamar preguntas para que las recuerde pavan shamar citas de seguimiento  ACUERDOS SOBRE PERRY CUIDADO:   Usted tiene el derecho de participar en la planificación de perry cuidado  Aprenda todo lo que pueda sobre perry condición y roberto darle tratamiento  Discuta con shamar médicos shamar opciones de tratamiento para juntos decidir el cuidado que usted quiere recibir  Usted siempre tiene el derecho a rechazar perry tratamiento  Esta información es sólo para uso en educación  Perry intención no es darle un consejo médico sobre enfermedades o tratamientos  Colsulte con perry Janice Poncho farmacéutico antes de seguir cualquier régimen médico para saber si es seguro y efectivo para usted  © 2014 [address] is for End User's use only and may not be sold, redistributed or otherwise used for commercial purposes  All illustrations and images included in CareNotes® are the copyrighted property of A D A M , Inc  or Chance hayes perry eusebia   CUIDADO AMBULATORIO:   Los riesgos para gonzalez eusebia si usted fuma:  La nicotina y otros químicos que se encuentran en el tabaco dañan todas las células del cuerpo  Aunque fume poco o sea un fumador social, el riesgo de [address] , enfermedad del corazón y enfermedad de los pulmones [place]  Si usted está embarazada o tiene diabetes, el fumar aumenta gonzalez riesgo de sufrir complicaciones  Los beneficios para gonzalez eusebia si usted candace de fumar:   · Disminuyen los síntomas respiratorios roberto tos, sibilancias y dificultad para respirar  · Usted reduce el riesgo de cáncer de pulmón, bucal, de la garganta, riñón, vejiga, páncreas, estómago y cervix  Si usted ya tiene cáncer, al no fumar aumenta los beneficios de la quimioterapia  También reduce gonzalez riesgo de que el cáncer regrese o que desarrolle un ahsan cáncer  · Usted reduce gonzalez riesgo de andreea enfermedad cardíaca, coágulos sanguíneos, ataque cardíaco y un derrame cerebral      · Usted reduce gonzalez riesgo de presentar infecciones y [employer], roberto la neumonía, el asma, la bronquitis crónica y [place]  · Gonzalez circulación mejora  Al permitir que [employer] suministro de oxígeno a gonzalez cuerpo  Si usted tiene diabetes, disminuye gonzalez riesgo de complicaciones, roberto enfermedades del [place], de las arterias y de los ojos  Usted también disminuye gonzalez riesgo de daño a los nervios  El daño a los nervios puede conllevar a andreea amputación, edwin vista y ceguera  · La capacidad de gonzalez cuerpo para sanar y combatir infecciones mejora  Los beneficios para la eusebia de las otras personas si usted candace de fumar:  El tabaco es perjudicial para los no fumadores que respiran el humo de forma pasiva  Adrian Marta son maneras en que puede mejorar la eusebia de las personas que lo rodean cuando usted candace de fumar:  · Usted disminuye el riesgo de cáncer en los pulmones y de enfermedades del corazón en los adultos que no fuman       · Si usted [place], disminuye madden riesgo de sufrir un aborto espontáneo, un parto antes de [place], un bebé de bajo peso al nacer y de muerte fetal  También disminuye el riesgo del bebé de sufrir el síndrome de muerte súbita del lactante (SMIS, o muerte en la cuna), obesidad, atrasos en el desarrollo y problemas neuroconductuales, roberto el trastorno por déficit de atención e hiperactividad [facility])  · Si usted tiene niños, disminuye el riesgo de que yesy niños contraigan infecciones, resfriados, neumonía, bronquitis y asma  Jodeane Chin y [de-identified] para dejar de fumar:   · Smokefree  gov  Phone: 4- 492 - [phone]  Web Address: www smokeGigsTime  Acuda a yesy consultas de control con madden médico según le indicaron  Anote yesy preguntas para que se acuerde de hacerlas pavan yesy visitas  © 2017 [address]  Information is for End User's use only and may not be sold, redistributed or otherwise used for commercial purposes  All illustrations and images included in CareNotes® are the copyrighted property of A D A M , Inc  or Chance Joe  Esta información es sólo para uso en educación  Madden intención no es darle un consejo médico sobre enfermedades o tratamientos  Colsulte con madden [place] Ra farmacéutico antes de seguir cualquier régimen médico para saber si es seguro y efectivo para usted  Disnea   LO QUE NECESITA SABER:   La disnea es andreea dificultad o incomodidad al respirar  Es posible que tenga respiración fatigosa, dolorosa o poco profunda  Es posible que le falte el aire  La disnea puede ocurrir [facility], [facility] en reposo o al realizar [employer]  Es posible que tenga disnea por un corto período de [place], o puede ser crónica  La disnea es a menudo un síntoma de andreea enfermedad o afección  INSTRUCCIONES SOBRE EL [facility]:   Regrese a la hailey de emergencias si:   · Yesy signos y síntomas están igual o empeoran en las siguientes 24 horas del tratamiento       · Usted tiene escalofríos con temblores o fiebre de más de 102° F (38 9° C)  · Usted tiene un [employer], presión u opresión en gonzalez pecho  · Usted tiene andreea nueva o empeora gonzalez tos o sibilancias (respiración ruidosa en el pecho) o expectora vinnie    · Usted siente que no recibe suficiente aire  · La piel sobre shamar costillas o en gonzalez seth se hunden cuando usted respira  · Usted tiene un damian dolor de héctor con vómitos y dolor abdominal      · Usted se siente confundido o mareado  Comuníquese con gonzalez especialista o médico sí:   · Usted tiene preguntas o inquietudes acerca de gonzalez condición o cuidado  Medicamentos:   · Medicamentos,  pueden administrarse para tratar la causa de gonzalez disnea  Los [employer] pueden reducir la inflamación de las vías respiratorias o disminuir el líquido alrededor del corazón o los pulmones  Se pueden administrar otros medicamentos para reducir la ansiedad y para que se sienta más calmado y [place] matt  · [place] shamar medicamentos roberto se le haya indicado  Consulte con gonzalez médico si usted fitz que gonzalez medicamento no le está ayudando o si presenta efectos secundarios  Infórmele si es alérgico a cualquier medicamento  Mantenga andreea lista actualizada de los [employer], las vitaminas y los productos herbales que mark  Incluya los siguientes datos de los medicamentos: cantidad, frecuencia y motivo de administración  Traiga con usted la lista o los envases de la píldoras a shamar citas de seguimiento  Lleve la lista de los medicamentos con usted en claudette de andreea emergencia  Controle la disnea de larga duración:   · [place] un plan de acción  Usted y gonzalez médico pueden trabajar juntos para crear un plan sobre cómo [place] episodios de disnea  El plan puede incluir actividades cotidianas, cambios en el tratamiento y qué hacer si tiene problemas respiratorios graves  · Al elle asiento inclínese hacia adelante en shamar codos  [employer] ayuda a [employer] y a facilitar la respiración      · Use la respiración con los labios fruncidos cada vez que se sienta corto de respiración  Respire por la nariz y muy despacio exhale por madden boca con los labios ligeramente fruncidos o en forma de ''U''  Se debería demorar el doble de tiempo al expulsar el aire de lo que le stef en inhalarlo  · No fume  La nicotina y otros químicos contenidos en los cigarrillos y puros pueden causar daño pulmonar y empeorar shamar síntomas  Pida información a madden médico si usted actualmente fuma y necesita ayuda para dejar de fumar  Los cigarrillos electrónicos o tabaco sin humo todavía contienen nicotina  Consulte con madden médico antes de [employer]  · [place] o mantenga un peso saludable  Madden médico le puede ayudar a elaborar un plan para perder peso de andreea forma hayden si usted tiene sobrepeso  · Ejercítese según indicaciones  El ejercicio ayuda a los pulmones a trabajar con más facilidad  El ejercicio también ayuda a perder peso, si fuera necesario  Trate de hacer unos 30 minutos de actividad física la mayoría de los días de la [place]  Madden médico puede ayudarlo a crear un plan de ejercicios que sea seguro para usted  Sukh andreea karson de control con madden médico o especialista roberto se lo indicaron:  Anote shamar preguntas para que se acuerde de hacerlas pavan shamar visitas  © 2017 [address] Information is for End User's use only and may not be sold, redistributed or otherwise used for commercial purposes  All illustrations and images included in CareNotes® are the copyrighted property of A D A M , Inc  or Chance Joe  Esta información es sólo para uso en educación  Madden intención no es darle un consejo médico sobre enfermedades o tratamientos  Colsulte con madden Miley Antis farmacéutico antes de seguir cualquier régimen médico para saber si es seguro y efectivo para usted

## 2018-11-05 NOTE — ED PROVIDER NOTES
History  Chief Complaint   Patient presents with    Shortness of Breath     pt c/o SOB for 3 days, reports hx of asthma  pt also c/o "scratchy" throat  pt reports symptoms worsened when returning to work  This is a 26year old female who is here because she is SOB for the last 3 days and has a scratchy throat  She has been using her MDI  LMP 10/20/18  + smoker           History provided by:  Patient, medical records and friend   used: No    Shortness of Breath   Severity:  Unable to specify  Relieved by:  Nothing  Ineffective treatments:  Inhaler  Associated symptoms: sore throat    Risk factors: tobacco use        Prior to Admission Medications   Prescriptions Last Dose Informant Patient Reported? Taking?   acetaminophen (TYLENOL) 500 mg tablet   No No   Sig: Take 1 tablet (500 mg total) by mouth every 6 (six) hours as needed for mild pain, moderate pain, headaches or fever   albuterol (ACCUNEB) 1 25 MG/3ML nebulizer solution   No No   Sig: Take 3 mL (1 25 mg total) by nebulization every 6 (six) hours as needed for wheezing   albuterol (PROVENTIL HFA,VENTOLIN HFA) 90 mcg/act inhaler   No No   Sig: Inhale 2 puffs every 6 (six) hours as needed for wheezing   naproxen (NAPROSYN) 500 mg tablet   No No   Sig: Take 1 tablet (500 mg total) by mouth 2 (two) times a day with meals for 5 days      Facility-Administered Medications: None       Past Medical History:   Diagnosis Date    Asthma     Psychiatric disorder        History reviewed  No pertinent surgical history  History reviewed  No pertinent family history  I have reviewed and agree with the history as documented  Social History   Substance Use Topics    Smoking status: Current Every Day Smoker     Types: Cigarettes    Smokeless tobacco: Never Used    Alcohol use Yes      Comment: socially         Review of Systems   Constitutional: Negative  HENT: Positive for sore throat  Eyes: Negative      Respiratory: Positive for shortness of breath  Cardiovascular: Negative  Gastrointestinal: Negative  Endocrine: Negative  Genitourinary: Negative  Musculoskeletal: Negative  Skin: Negative  Allergic/Immunologic: Negative  Neurological: Negative  Hematological: Negative  Psychiatric/Behavioral: Negative  Physical Exam  Physical Exam   Constitutional: She is oriented to person, place, and time  She appears well-developed and well-nourished  No distress  HENT:   Head: Normocephalic and atraumatic  Right Ear: External ear normal    Left Ear: External ear normal    Nose: Nose normal    Mouth/Throat: Oropharynx is clear and moist  No oropharyngeal exudate  Eyes: Pupils are equal, round, and reactive to light  EOM are normal    Neck: Normal range of motion  Neck supple  Cardiovascular: Normal rate, regular rhythm and normal heart sounds  Pulmonary/Chest: Effort normal    Decreased breath sounds through out    Abdominal: Soft  Bowel sounds are normal  She exhibits no distension  There is no tenderness  Musculoskeletal: Normal range of motion  Neurological: She is alert and oriented to person, place, and time  Skin: Skin is warm and dry  Capillary refill takes less than 2 seconds  Psychiatric: She has a normal mood and affect  Her behavior is normal  Judgment and thought content normal    Nursing note and vitals reviewed        Vital Signs  ED Triage Vitals [11/05/18 0442]   Temperature Pulse Respirations Blood Pressure SpO2   (!) 97 4 °F (36 3 °C) 97 19 114/59 98 %      Temp Source Heart Rate Source Patient Position - Orthostatic VS BP Location FiO2 (%)   Oral Monitor Sitting Right arm --      Pain Score       --           Vitals:    11/05/18 0442   BP: 114/59   Pulse: 97   Patient Position - Orthostatic VS: Sitting       Visual Acuity      ED Medications  Medications   ipratropium-albuterol (DUO-NEB) 0 5-2 5 mg/3 mL inhalation solution 3 mL (3 mL Nebulization Given 11/5/18 4084) Diagnostic Studies  Results Reviewed     Procedure Component Value Units Date/Time    POCT pregnancy, urine [[id number]]  (Normal) Resulted:  11/05/18 0505    Lab Status:  Final result Updated:  11/05/18 0505     EXT PREG TEST UR (Ref: Negative) negative                 No orders to display              Procedures  Procedures       Phone Contacts  ED Phone Contact    ED Course                               MDM  Number of Diagnoses or Management Options  Diagnosis management comments: Asthma history  Tobacco use    Duoneb  Refill ventolin MDI      Male friend at beside stated when I assessed pt neck for lymphadenopathy and pt pulled back, he commented that pt was in a work accident  I am not finding anything on EMR related to pt in work accident and pt and friend are very vague forthcoming with information  Pt needs work note  I will not release pt back to work if anything is work related  Amount and/or Complexity of Data Reviewed  Clinical lab tests: ordered and reviewed  Review and summarize past medical records: yes      CritCare Time    Disposition  Final diagnoses:   Asthma   Shortness of breath   Tobacco use     Time reflects when diagnosis was documented in both MDM as applicable and the Disposition within this note     Time User Action Codes Description Comment    11/5/2018  4:59 AM Ellis Garrison [S23 288] Asthma     11/5/2018  5:00 AM Alonza Dikes Add [R06 02] Shortness of breath     11/5/2018  5:00 AM Edwin Hernandez [W25 140] Asthma     11/5/2018  5:00 AM Alonza Dikes Modify [R06 02] Shortness of breath     11/5/2018  5:00 AM Alonza Dikes Add [Z72 0] Tobacco use       ED Disposition     ED Disposition Condition Comment    Discharge  Danielle Philip discharge to home/self care      Condition at discharge: Good        Follow-up Information     Follow up With Specialties Details Why Contact Info Additional [place] [facility] Schedule an appointment as soon as possible for a visit in 2 days  Aayush Woo   49  [phone]       Infolink   call to find a [phone] [address] [facility] Emergency Medicine  If symptoms worsen [facility]  [phone] [place] [facility], [address], [place], [address]          Discharge Medication List as of 11/5/2018  5:03 AM      START taking these medications    Details   !! albuterol (PROVENTIL HFA,VENTOLIN HFA) 90 mcg/act inhaler Inhale 2 puffs every 4 (four) hours as needed for wheezing or shortness of breath, Starting Mon 11/5/2018, Print       !! - Potential duplicate medications found  Please discuss with provider  CONTINUE these medications which have NOT CHANGED    Details   acetaminophen (TYLENOL) 500 mg tablet Take 1 tablet (500 mg total) by mouth every 6 (six) hours as needed for mild pain, moderate pain, headaches or fever, Starting Sun 8/12/2018, Print      albuterol (ACCUNEB) 1 25 MG/3ML nebulizer solution Take 3 mL (1 25 mg total) by nebulization every 6 (six) hours as needed for wheezing, Starting Sun 9/9/2018, Until Mon 9/9/2019, Print      !! albuterol (PROVENTIL HFA,VENTOLIN HFA) 90 mcg/act inhaler Inhale 2 puffs every 6 (six) hours as needed for wheezing, Starting Sun 9/9/2018, Until Mon 9/9/2019, Print      naproxen (NAPROSYN) 500 mg tablet Take 1 tablet (500 mg total) by mouth 2 (two) times a day with meals for 5 days, Starting Sun 9/23/2018, Until Fri 9/28/2018, Print       !! - Potential duplicate medications found  Please discuss with provider  No discharge procedures on file      ED Provider  Electronically Signed by           Rossy Garcia  11/05/18 0441

## 2019-02-28 ENCOUNTER — APPOINTMENT (EMERGENCY)
Dept: RADIOLOGY | Facility: HOSPITAL | Age: 34
End: 2019-02-28
Payer: COMMERCIAL

## 2019-02-28 ENCOUNTER — HOSPITAL ENCOUNTER (OUTPATIENT)
Facility: HOSPITAL | Age: 34
Setting detail: OBSERVATION
Discharge: LEFT BEFORE DISCHARGE | End: 2019-03-03
Attending: EMERGENCY MEDICINE | Admitting: INTERNAL MEDICINE
Payer: COMMERCIAL

## 2019-02-28 DIAGNOSIS — E87.6 HYPOKALEMIA: ICD-10-CM

## 2019-02-28 DIAGNOSIS — K92.0 HEMATEMESIS: ICD-10-CM

## 2019-02-28 DIAGNOSIS — R56.9 SEIZURE (HCC): ICD-10-CM

## 2019-02-28 DIAGNOSIS — K92.0 HEMATEMESIS WITH NAUSEA: Primary | ICD-10-CM

## 2019-02-28 DIAGNOSIS — F44.5 PSYCHIATRIC PSEUDOSEIZURE: ICD-10-CM

## 2019-02-28 DIAGNOSIS — M25.511 RIGHT SHOULDER PAIN: ICD-10-CM

## 2019-02-28 DIAGNOSIS — K92.2 GI BLEED: ICD-10-CM

## 2019-02-28 PROBLEM — M25.519 SHOULDER PAIN: Status: ACTIVE | Noted: 2019-02-28

## 2019-02-28 LAB
ABO GROUP BLD: NORMAL
ALBUMIN SERPL BCP-MCNC: 3.8 G/DL (ref 3.5–5)
ALP SERPL-CCNC: 107 U/L (ref 46–116)
ALT SERPL W P-5'-P-CCNC: 21 U/L (ref 12–78)
ANION GAP SERPL CALCULATED.3IONS-SCNC: 10 MMOL/L (ref 4–13)
APTT PPP: 32 SECONDS (ref 26–38)
AST SERPL W P-5'-P-CCNC: 15 U/L (ref 5–45)
BASOPHILS # BLD AUTO: 0.12 THOUSANDS/ΜL (ref 0–0.1)
BASOPHILS NFR BLD AUTO: 2 % (ref 0–1)
BILIRUB SERPL-MCNC: 0.19 MG/DL (ref 0.2–1)
BLD GP AB SCN SERPL QL: NEGATIVE
BUN SERPL-MCNC: 9 MG/DL (ref 5–25)
CALCIUM SERPL-MCNC: 8.6 MG/DL (ref 8.3–10.1)
CHLORIDE SERPL-SCNC: 104 MMOL/L (ref 100–108)
CO2 SERPL-SCNC: 28 MMOL/L (ref 21–32)
CREAT SERPL-MCNC: 0.82 MG/DL (ref 0.6–1.3)
EOSINOPHIL # BLD AUTO: 0.32 THOUSAND/ΜL (ref 0–0.61)
EOSINOPHIL NFR BLD AUTO: 4 % (ref 0–6)
ERYTHROCYTE [DISTWIDTH] IN BLOOD BY AUTOMATED COUNT: 13.7 % (ref 11.6–15.1)
EXT PREG TEST URINE: NEGATIVE
GFR SERPL CREATININE-BSD FRML MDRD: 93 ML/MIN/1.73SQ M
GLUCOSE SERPL-MCNC: 74 MG/DL (ref 65–140)
HCT VFR BLD AUTO: 41.1 % (ref 34.8–46.1)
HGB BLD-MCNC: 13.2 G/DL (ref 11.5–15.4)
IMM GRANULOCYTES # BLD AUTO: 0.02 THOUSAND/UL (ref 0–0.2)
IMM GRANULOCYTES NFR BLD AUTO: 0 % (ref 0–2)
INR PPP: 0.92 (ref 0.86–1.17)
LACTATE SERPL-SCNC: 0.9 MMOL/L (ref 0.5–2)
LIPASE SERPL-CCNC: 113 U/L (ref 73–393)
LYMPHOCYTES # BLD AUTO: 2.6 THOUSANDS/ΜL (ref 0.6–4.47)
LYMPHOCYTES NFR BLD AUTO: 34 % (ref 14–44)
MCH RBC QN AUTO: 29.4 PG (ref 26.8–34.3)
MCHC RBC AUTO-ENTMCNC: 32.1 G/DL (ref 31.4–37.4)
MCV RBC AUTO: 92 FL (ref 82–98)
MONOCYTES # BLD AUTO: 0.53 THOUSAND/ΜL (ref 0.17–1.22)
MONOCYTES NFR BLD AUTO: 7 % (ref 4–12)
NEUTROPHILS # BLD AUTO: 4.14 THOUSANDS/ΜL (ref 1.85–7.62)
NEUTS SEG NFR BLD AUTO: 53 % (ref 43–75)
NRBC BLD AUTO-RTO: 0 /100 WBCS
PLATELET # BLD AUTO: 361 THOUSANDS/UL (ref 149–390)
PMV BLD AUTO: 9.6 FL (ref 8.9–12.7)
POTASSIUM SERPL-SCNC: 3.3 MMOL/L (ref 3.5–5.3)
PROT SERPL-MCNC: 7.7 G/DL (ref 6.4–8.2)
PROTHROMBIN TIME: 12.5 SECONDS (ref 11.8–14.2)
RBC # BLD AUTO: 4.49 MILLION/UL (ref 3.81–5.12)
RH BLD: POSITIVE
SODIUM SERPL-SCNC: 142 MMOL/L (ref 136–145)
SPECIMEN EXPIRATION DATE: NORMAL
TROPONIN I SERPL-MCNC: <0.02 NG/ML
WBC # BLD AUTO: 7.73 THOUSAND/UL (ref 4.31–10.16)

## 2019-02-28 PROCEDURE — 81025 URINE PREGNANCY TEST: CPT | Performed by: EMERGENCY MEDICINE

## 2019-02-28 PROCEDURE — 99220 PR INITIAL OBSERVATION CARE/DAY 70 MINUTES: CPT | Performed by: PHYSICIAN ASSISTANT

## 2019-02-28 PROCEDURE — 96365 THER/PROPH/DIAG IV INF INIT: CPT

## 2019-02-28 PROCEDURE — 74022 RADEX COMPL AQT ABD SERIES: CPT

## 2019-02-28 PROCEDURE — 96375 TX/PRO/DX INJ NEW DRUG ADDON: CPT

## 2019-02-28 PROCEDURE — 83605 ASSAY OF LACTIC ACID: CPT | Performed by: EMERGENCY MEDICINE

## 2019-02-28 PROCEDURE — 73060 X-RAY EXAM OF HUMERUS: CPT

## 2019-02-28 PROCEDURE — 85610 PROTHROMBIN TIME: CPT | Performed by: EMERGENCY MEDICINE

## 2019-02-28 PROCEDURE — 85730 THROMBOPLASTIN TIME PARTIAL: CPT | Performed by: EMERGENCY MEDICINE

## 2019-02-28 PROCEDURE — 84484 ASSAY OF TROPONIN QUANT: CPT | Performed by: EMERGENCY MEDICINE

## 2019-02-28 PROCEDURE — 80053 COMPREHEN METABOLIC PANEL: CPT | Performed by: EMERGENCY MEDICINE

## 2019-02-28 PROCEDURE — C9113 INJ PANTOPRAZOLE SODIUM, VIA: HCPCS | Performed by: EMERGENCY MEDICINE

## 2019-02-28 PROCEDURE — 36415 COLL VENOUS BLD VENIPUNCTURE: CPT | Performed by: EMERGENCY MEDICINE

## 2019-02-28 PROCEDURE — 86850 RBC ANTIBODY SCREEN: CPT | Performed by: EMERGENCY MEDICINE

## 2019-02-28 PROCEDURE — 83690 ASSAY OF LIPASE: CPT | Performed by: EMERGENCY MEDICINE

## 2019-02-28 PROCEDURE — 93005 ELECTROCARDIOGRAM TRACING: CPT

## 2019-02-28 PROCEDURE — 85025 COMPLETE CBC W/AUTO DIFF WBC: CPT | Performed by: EMERGENCY MEDICINE

## 2019-02-28 PROCEDURE — 73030 X-RAY EXAM OF SHOULDER: CPT

## 2019-02-28 PROCEDURE — 99285 EMERGENCY DEPT VISIT HI MDM: CPT

## 2019-02-28 PROCEDURE — 86901 BLOOD TYPING SEROLOGIC RH(D): CPT | Performed by: EMERGENCY MEDICINE

## 2019-02-28 PROCEDURE — 86900 BLOOD TYPING SEROLOGIC ABO: CPT | Performed by: EMERGENCY MEDICINE

## 2019-02-28 RX ORDER — POTASSIUM CHLORIDE 20 MEQ/1
40 TABLET, EXTENDED RELEASE ORAL ONCE
Status: COMPLETED | OUTPATIENT
Start: 2019-02-28 | End: 2019-02-28

## 2019-02-28 RX ORDER — ONDANSETRON 2 MG/ML
4 INJECTION INTRAMUSCULAR; INTRAVENOUS ONCE
Status: COMPLETED | OUTPATIENT
Start: 2019-02-28 | End: 2019-02-28

## 2019-02-28 RX ORDER — LORAZEPAM 2 MG/ML
0.5 INJECTION INTRAMUSCULAR ONCE
Status: COMPLETED | OUTPATIENT
Start: 2019-02-28 | End: 2019-02-28

## 2019-02-28 RX ORDER — CELECOXIB 100 MG/1
100 CAPSULE ORAL 2 TIMES DAILY
Status: DISCONTINUED | OUTPATIENT
Start: 2019-03-01 | End: 2019-03-01

## 2019-02-28 RX ORDER — ALPRAZOLAM 0.25 MG/1
0.25 TABLET ORAL
Status: DISCONTINUED | OUTPATIENT
Start: 2019-02-28 | End: 2019-03-01

## 2019-02-28 RX ORDER — DIPHENHYDRAMINE HCL 25 MG
25 TABLET ORAL ONCE
Status: COMPLETED | OUTPATIENT
Start: 2019-02-28 | End: 2019-02-28

## 2019-02-28 RX ORDER — ACETAMINOPHEN 325 MG/1
650 TABLET ORAL EVERY 6 HOURS PRN
Status: DISCONTINUED | OUTPATIENT
Start: 2019-02-28 | End: 2019-03-03 | Stop reason: HOSPADM

## 2019-02-28 RX ORDER — POTASSIUM CHLORIDE 14.9 MG/ML
20 INJECTION INTRAVENOUS ONCE
Status: DISCONTINUED | OUTPATIENT
Start: 2019-02-28 | End: 2019-03-03 | Stop reason: HOSPADM

## 2019-02-28 RX ORDER — LIDOCAINE 50 MG/G
2 PATCH TOPICAL DAILY
Status: DISCONTINUED | OUTPATIENT
Start: 2019-03-01 | End: 2019-03-03 | Stop reason: HOSPADM

## 2019-02-28 RX ORDER — LIDOCAINE 50 MG/G
1 PATCH TOPICAL ONCE
Status: COMPLETED | OUTPATIENT
Start: 2019-02-28 | End: 2019-03-01

## 2019-02-28 RX ORDER — BACLOFEN 10 MG/1
10 TABLET ORAL 2 TIMES DAILY
Status: DISCONTINUED | OUTPATIENT
Start: 2019-03-01 | End: 2019-03-03 | Stop reason: HOSPADM

## 2019-02-28 RX ORDER — NICOTINE 21 MG/24HR
21 PATCH, TRANSDERMAL 24 HOURS TRANSDERMAL DAILY
Status: DISCONTINUED | OUTPATIENT
Start: 2019-03-01 | End: 2019-02-28

## 2019-02-28 RX ORDER — NICOTINE 21 MG/24HR
21 PATCH, TRANSDERMAL 24 HOURS TRANSDERMAL DAILY
Status: DISCONTINUED | OUTPATIENT
Start: 2019-02-28 | End: 2019-03-03 | Stop reason: HOSPADM

## 2019-02-28 RX ADMIN — SODIUM CHLORIDE 1000 ML: 0.9 INJECTION, SOLUTION INTRAVENOUS at 20:40

## 2019-02-28 RX ADMIN — POTASSIUM CHLORIDE 40 MEQ: 1500 TABLET, EXTENDED RELEASE ORAL at 23:19

## 2019-02-28 RX ADMIN — LIDOCAINE 1 PATCH: 50 PATCH TOPICAL at 21:37

## 2019-02-28 RX ADMIN — LORAZEPAM 0.5 MG: 2 INJECTION INTRAMUSCULAR; INTRAVENOUS at 21:39

## 2019-02-28 RX ADMIN — SODIUM CHLORIDE 80 MG: 9 INJECTION, SOLUTION INTRAVENOUS at 20:40

## 2019-02-28 RX ADMIN — ONDANSETRON 4 MG: 2 INJECTION INTRAMUSCULAR; INTRAVENOUS at 20:37

## 2019-02-28 RX ADMIN — DIPHENHYDRAMINE HCL 25 MG: 25 TABLET, FILM COATED ORAL at 23:21

## 2019-02-28 RX ADMIN — NICOTINE 21 MG: 21 PATCH TRANSDERMAL at 23:18

## 2019-03-01 ENCOUNTER — ANESTHESIA (OUTPATIENT)
Dept: GASTROENTEROLOGY | Facility: HOSPITAL | Age: 34
End: 2019-03-01
Payer: COMMERCIAL

## 2019-03-01 ENCOUNTER — ANESTHESIA EVENT (OUTPATIENT)
Dept: GASTROENTEROLOGY | Facility: HOSPITAL | Age: 34
End: 2019-03-01
Payer: COMMERCIAL

## 2019-03-01 PROBLEM — K92.0 HEMATEMESIS WITH NAUSEA: Status: ACTIVE | Noted: 2019-02-28

## 2019-03-01 PROBLEM — K92.2 GI BLEED: Status: ACTIVE | Noted: 2019-02-28

## 2019-03-01 LAB
ANION GAP SERPL CALCULATED.3IONS-SCNC: 9 MMOL/L (ref 4–13)
ATRIAL RATE: 93 BPM
BUN SERPL-MCNC: 9 MG/DL (ref 5–25)
CALCIUM SERPL-MCNC: 8.5 MG/DL (ref 8.3–10.1)
CHLORIDE SERPL-SCNC: 106 MMOL/L (ref 100–108)
CO2 SERPL-SCNC: 23 MMOL/L (ref 21–32)
CREAT SERPL-MCNC: 0.69 MG/DL (ref 0.6–1.3)
GFR SERPL CREATININE-BSD FRML MDRD: 113 ML/MIN/1.73SQ M
GLUCOSE SERPL-MCNC: 103 MG/DL (ref 65–140)
HCT VFR BLD AUTO: 37.1 % (ref 34.8–46.1)
HCT VFR BLD AUTO: 37.3 % (ref 34.8–46.1)
HGB BLD-MCNC: 11.9 G/DL (ref 11.5–15.4)
HGB BLD-MCNC: 12 G/DL (ref 11.5–15.4)
MAGNESIUM SERPL-MCNC: 2 MG/DL (ref 1.6–2.6)
P AXIS: 60 DEGREES
POTASSIUM SERPL-SCNC: 4.3 MMOL/L (ref 3.5–5.3)
PR INTERVAL: 136 MS
QRS AXIS: 48 DEGREES
QRSD INTERVAL: 80 MS
QT INTERVAL: 326 MS
QTC INTERVAL: 405 MS
SODIUM SERPL-SCNC: 138 MMOL/L (ref 136–145)
T WAVE AXIS: 36 DEGREES
VENTRICULAR RATE: 93 BPM

## 2019-03-01 PROCEDURE — 85018 HEMOGLOBIN: CPT | Performed by: PHYSICIAN ASSISTANT

## 2019-03-01 PROCEDURE — 80048 BASIC METABOLIC PNL TOTAL CA: CPT | Performed by: PHYSICIAN ASSISTANT

## 2019-03-01 PROCEDURE — 93010 ELECTROCARDIOGRAM REPORT: CPT | Performed by: INTERNAL MEDICINE

## 2019-03-01 PROCEDURE — C9113 INJ PANTOPRAZOLE SODIUM, VIA: HCPCS | Performed by: INTERNAL MEDICINE

## 2019-03-01 PROCEDURE — 85014 HEMATOCRIT: CPT | Performed by: PHYSICIAN ASSISTANT

## 2019-03-01 PROCEDURE — 83735 ASSAY OF MAGNESIUM: CPT | Performed by: PHYSICIAN ASSISTANT

## 2019-03-01 PROCEDURE — 99225 PR SBSQ OBSERVATION CARE/DAY 25 MINUTES: CPT | Performed by: INTERNAL MEDICINE

## 2019-03-01 RX ORDER — ONDANSETRON 2 MG/ML
4 INJECTION INTRAMUSCULAR; INTRAVENOUS EVERY 6 HOURS PRN
Status: CANCELLED | OUTPATIENT
Start: 2019-03-01

## 2019-03-01 RX ORDER — DIPHENHYDRAMINE HCL 25 MG
25 TABLET ORAL
Status: DISCONTINUED | OUTPATIENT
Start: 2019-03-01 | End: 2019-03-03 | Stop reason: HOSPADM

## 2019-03-01 RX ORDER — LANOLIN ALCOHOL/MO/W.PET/CERES
6 CREAM (GRAM) TOPICAL
Status: DISCONTINUED | OUTPATIENT
Start: 2019-03-01 | End: 2019-03-03 | Stop reason: HOSPADM

## 2019-03-01 RX ORDER — SODIUM CHLORIDE 9 MG/ML
75 INJECTION, SOLUTION INTRAVENOUS CONTINUOUS
Status: DISCONTINUED | OUTPATIENT
Start: 2019-03-01 | End: 2019-03-03 | Stop reason: HOSPADM

## 2019-03-01 RX ADMIN — MORPHINE SULFATE 2 MG: 2 INJECTION, SOLUTION INTRAMUSCULAR; INTRAVENOUS at 19:32

## 2019-03-01 RX ADMIN — DIPHENHYDRAMINE HCL 25 MG: 25 TABLET, FILM COATED ORAL at 19:37

## 2019-03-01 RX ADMIN — MELATONIN 6 MG: 3 TAB ORAL at 21:09

## 2019-03-01 RX ADMIN — SODIUM CHLORIDE 125 ML/HR: 0.9 INJECTION, SOLUTION INTRAVENOUS at 13:14

## 2019-03-01 RX ADMIN — NICOTINE 21 MG: 21 PATCH TRANSDERMAL at 09:07

## 2019-03-01 RX ADMIN — CELECOXIB 100 MG: 100 CAPSULE ORAL at 09:10

## 2019-03-01 RX ADMIN — MORPHINE SULFATE 2 MG: 2 INJECTION, SOLUTION INTRAMUSCULAR; INTRAVENOUS at 15:15

## 2019-03-01 RX ADMIN — SODIUM CHLORIDE 125 ML/HR: 0.9 INJECTION, SOLUTION INTRAVENOUS at 15:18

## 2019-03-01 RX ADMIN — LIDOCAINE 2 PATCH: 50 PATCH TOPICAL at 09:06

## 2019-03-01 RX ADMIN — SODIUM CHLORIDE 8 MG/HR: 9 INJECTION, SOLUTION INTRAVENOUS at 15:17

## 2019-03-01 RX ADMIN — BACLOFEN 10 MG: 10 TABLET ORAL at 09:10

## 2019-03-01 NOTE — PROGRESS NOTES
Patient was unable to get EGD done due to eating prior to EGD even after being told and expressed understanding of being NPO for the procedure  Soon after patient came back to E5 after EGD attempt the patient angrily removed both IVs complaining about pain and wanting to eat what she wants and when she wants  RN tried to explain the plan of care and the importance of being NPO for an EGD  Patient began to raise her voice expressing the need to see the doctor  Dr Cahta Gabriel was called and did report to beside  She continued to raise her voice and complain about pain and hunger  Discussions resolved and patient now agreeing to stay and not leaving against medical advice  Patient also stating she understands that she will be on clear liquids for now until an EGD again on Monday

## 2019-03-01 NOTE — ANESTHESIA PREPROCEDURE EVALUATION
Review of Systems/Medical History  Patient summary reviewed  Chart reviewed  No history of anesthetic complications     Cardiovascular  Negative cardio ROS    Pulmonary  Smoker cigarette smoker  , Asthma ,        GI/Hepatic      Comment: h/o hematemesis        H/H stable     no recent bouts of hematemesis      Negative  ROS        Endo/Other  Negative endo/other ROS      GYN      Comment: 2/28/19 urine preg negative      Hematology  Negative hematology ROS      Musculoskeletal  Negative musculoskeletal ROS        Neurology  Negative neurology ROS      Psychology   Negative psychology ROS              Physical Exam    Airway    Mallampati score: II  TM Distance: >3 FB  Neck ROM: full     Dental   No notable dental hx     Cardiovascular  Comment: Negative ROS, Rhythm: regular, Rate: normal, Cardiovascular exam normal    Pulmonary  Pulmonary exam normal Breath sounds clear to auscultation,     Other Findings        Anesthesia Plan  ASA Score- 2     Anesthesia Type- general and IV sedation with anesthesia with ASA Monitors  Additional Monitors:   Airway Plan:         Plan Factors-    Induction- intravenous  Postoperative Plan-     Informed Consent- Anesthetic plan and risks discussed with patient

## 2019-03-01 NOTE — ED NOTES
Patient c/o pain to IV site after starting potassium infusion       Frida Francois RN  02/28/19 8978

## 2019-03-01 NOTE — H&P
H&P- Demetra Epp 1985, 26 y o  female MRN: [id number]    Unit/Bed#: ED 18 Encounter: [id number]    Primary Care Provider: No primary care provider on file  Date and time admitted to hospital: 2/28/2019  6:47 PM    History and Physical - [facility] Internal Medicine    Patient Information: Demetra Bean 26 y o  female MRN: [id number]  Unit/Bed#: ED 18 Encounter: [id number]  Admitting Physician: Daniela Guadalupe PA-C  PCP: No primary care provider on file  Date of Admission:  02/28/19    Assessment/Plan:    Hospital Problem List:     Principal Problem:    Hematemesis  Active Problems:    Hypokalemia    Shoulder pain      * Hematemesis  Assessment & Plan  Concerning for erosive esophagitis vs hemorrhagic gastritis  S/sx on set for 3 days w/o anemia  Consider steve moody tear but pt has not been having dry heaving or vomiting prior to onset of hematemesis in setting of nsaid overuse  Start clear liquid diet, monitor h/hs q 6h, will continue protonix gtt for now, likely will need to be on ppi 40mg daily upon d/c and if no improvement op f/u with GI    If pt develops significant anemia may need further monitoring and GI consultation    Shoulder pain  Assessment & Plan  Consistent w/rotator cuff disease ongoing for 2 months w/concomittant low back pain w/o radiculopathy  Xray pending official read however no trauma or obvious dislocation  Would favor celebrex in favor of motrin/aspirin for pain relief  Likely would benefit from some op PT  Start baclofen and lidocaine for low back pain, acetaminophen, celebrex and ice/heat for shoulder pain    Hypokalemia  Assessment & Plan  Mild 2* n/v   Replete and repeat bmp in am          VTE Prophylaxis: Pharmacologic VTE Prophylaxis contraindicated due to gib  / reason for no mechanical VTE prophylaxis early ambulation   Code Status: fc  POLST: There is no POLST form on file for this patient (pre-hospital)    Anticipated Length of Stay: Patient will be admitted on an Observation basis with an anticipated length of stay of  Less than 2 midnights  Justification for Hospital Stay: hematemesis w/o anemia    Total Time for Visit, including Counseling / Coordination of Care: 45 minutes  Greater than 50% of this total time spent on direct patient counseling and coordination of care  Chief Complaint:   hematemesis    History of Present Illness:    Umesh Jiang is a 26 y o  female who presents with PMH of chronic shoulder and back pain in the hospital for hematemesis  Patient is company by her  who is at bedside  Patient is Argentine-speaking only  Conversation occurred between patient myself in [facility] [place]  Patient reports that over the last 3 days prior to admission she has been noticing bloody emesis w/occasional clots  She also noticed mild epigastric discomfort and nausea  She reports when she vomited it came to the point where she noticed the blood running out of her nose with her emesis  She reports a little lightheadedness w/standing but no cp or sob  No hx of GIB or hx of heavy alcohol consumption  She reports over the last 2 months she has been having  Right shoulder pain and back pain and has been taking motrin and aspirin for this pain  She will take anywhere from 1 tab of full strength and aspirin twice daily upwards of 4 tabs of each twice daily to help with her s/sx relief  Her pain in her shoulder is worse w/flexing and externally rotating her shoulder  She has not seen a provider for this  She has back pain which in her mid back and right back of lumbar spine around L1/L2 primarily in soft tissue  This does not radiate  She reports it has been difficult to manage her pain  She denies any trauma  Review of Systems:    Review of Systems   Constitutional: Negative for chills and fever  Respiratory: Negative for cough and shortness of breath      Gastrointestinal: Positive for abdominal pain, nausea and vomiting  Negative for blood in stool, constipation and diarrhea  Musculoskeletal: Positive for arthralgias and back pain  Neurological: Positive for light-headedness  All other systems reviewed and are negative  Past Medical and Surgical History:     Past Medical History:   Diagnosis Date    Asthma     Psychiatric disorder        History reviewed  No pertinent surgical history  Meds/Allergies:    Prior to Admission medications    Medication Sig Start Date End Date Taking? Authorizing Provider   acetaminophen (TYLENOL) 500 mg tablet Take 1 tablet (500 mg total) by mouth every 6 (six) hours as needed for mild pain, moderate pain, headaches or fever 8/12/18   Adalberto Palumbo PA-C   albuterol (ACCUNEB) 1 25 MG/3ML nebulizer solution Take 3 mL (1 25 mg total) by nebulization every 6 (six) hours as needed for wheezing 9/9/18 9/9/19  Remedios Daniels PA-C   albuterol (PROVENTIL HFA,VENTOLIN HFA) 90 mcg/act inhaler Inhale 2 puffs every 6 (six) hours as needed for wheezing 9/9/18 9/9/19  Remedios Daniels PA-C   albuterol (PROVENTIL HFA,VENTOLIN HFA) 90 mcg/act inhaler Inhale 2 puffs every 4 (four) hours as needed for wheezing or shortness of breath 11/5/18   Mariangel Leach DO   naproxen (NAPROSYN) 500 mg tablet Take 1 tablet (500 mg total) by mouth 2 (two) times a day with meals for 5 days 9/23/18 9/28/18  Rebel Solorzano DO     I have reviewed home medications with patient personally      Allergies: No Known Allergies    Social History:     Marital Status: Single   Occupation:   Patient Pre-hospital Living Situation:   Patient Pre-hospital Level of Mobility:   Patient Pre-hospital Diet Restrictions:   Substance Use History:   Social History     Substance and Sexual Activity   Alcohol Use Yes    Comment: socially      Social History     Tobacco Use   Smoking Status Current Every Day Smoker    Types: Cigarettes   Smokeless Tobacco Never Used     Social History     Substance and Sexual Activity   Drug Use No       Family History:    History reviewed  No pertinent family history  Physical Exam:     Vitals:   Blood Pressure: 111/70 (02/28/19 2053)  Pulse: 71 (02/28/19 2053)  Temperature: (!) 97 4 °F (36 3 °C) (02/28/19 1846)  Temp Source: Oral (02/28/19 1846)  Respirations: 18 (02/28/19 2053)  Weight - Scale: 70 9 kg (156 lb 6 4 oz) (02/28/19 1846)  SpO2: 100 % (02/28/19 2053)    Physical Exam   Constitutional: She appears well-developed and well-nourished  No distress  HENT:   Head: Normocephalic and atraumatic  Right Ear: External ear normal    Left Ear: External ear normal    Mouth/Throat: Oropharynx is clear and moist  No oropharyngeal exudate  Eyes: Pupils are equal, round, and reactive to light  Conjunctivae and EOM are normal  Right eye exhibits no discharge  Left eye exhibits no discharge  Neck: Normal range of motion  Cardiovascular: Normal rate and regular rhythm  Exam reveals no gallop and no friction rub  No murmur heard  Pulmonary/Chest: Effort normal and breath sounds normal  No stridor  No respiratory distress  She has no wheezes  She has no rales  Abdominal: Soft  She exhibits no distension and no mass  There is tenderness (mild TTP in RUQ)  There is no guarding  Musculoskeletal: She exhibits tenderness (mild TTP of lower thoracic and upper lumbar spine with involvement of soft tissue and musculature w/mild spasm)  She exhibits no edema  Pain in shoulder w/flexion and external rotation in right shoulder w/PROM  Full ROM expressed however   Neurological: She is alert  Skin: Skin is warm and dry  Capillary refill takes less than 2 seconds  She is not diaphoretic  Psychiatric: She has a normal mood and affect  Vitals reviewed  (  Be Sure to Include Physical Exam: Delete this entire line when you have entered your exam)    Additional Data:     Lab Results: I have personally reviewed pertinent reports        Results from last 7 days   Lab Units 02/28/19 1957   WBC Thousand/uL 7 73   HEMOGLOBIN g/dL 13 2   HEMATOCRIT % 41 1   PLATELETS Thousands/uL 361   NEUTROS PCT % 53   LYMPHS PCT % 34   MONOS PCT % 7   EOS PCT % 4     Results from last 7 days   Lab Units 02/28/19 1957   POTASSIUM mmol/L 3 3*   CHLORIDE mmol/L 104   CO2 mmol/L 28   BUN mg/dL 9   CREATININE mg/dL 0 82   CALCIUM mg/dL 8 6   ALK PHOS U/L 107   ALT U/L 21   AST U/L 15     Results from last 7 days   Lab Units 02/28/19 1957   INR  0 92       Imaging: I have personally reviewed pertinent reports  No results found  EKG, Pathology, and Other Studies Reviewed on Admission:   · EKG:     [facility] Epic Records Reviewed: Yes     ** Please Note: This note has been constructed using a voice recognition system   **

## 2019-03-01 NOTE — OP NOTE
OPERATIVE REPORT  PATIENT NAME: Nelson Gutiérrez    :  1985  MRN: [id number]  Pt Location: [facility] ROOM 01    SURGERY DATE: 3/1/2019    Surgeon(s) and Role:     * Fan Cartagena MD - Primary    Preop Diagnosis:  GI bleed [K92 2]  Hematemesis with nausea [K92 0]    Post-Op Diagnosis Codes:     * GI bleed [K92 2]     * Hematemesis with nausea [K92 0]    Procedure(s) (LRB):  ESOPHAGOGASTRODUODENOSCOPY (EGD) (N/A)    Specimen(s):  * No specimens in log *    Estimated Blood Loss:   Minimal    Drains:  * No LDAs found *    Anesthesia Type:   Conscious Sedation     Operative Indications:  GI bleed [K92 2]  Hematemesis with nausea [K92 0]      Operative Findings:cancelled      Complications:   None    Procedure and Technique:  cancelled   I was present for the entire procedure    Patient Disposition:  hemodynamically stable    SIGNATURE: Fan Cartagena MD  DATE: 2019  TIME: 1:35 PM

## 2019-03-01 NOTE — ED PROVIDER NOTES
History  Chief Complaint   Patient presents with    Arm Pain     Pt reports R arm pain x 3days reports heavy lifting at work   Vomiting Blood     Also reports 2 episodes of bloody emesis and "blood cam out of my nose", abdominal pain for days, denies fevers, denies urinary symptoms  I spoke with patient via  [id number]    49-year-old female presents stating that she has vomited blood 3 times on Tuesday, once yesterday and today at 1300 hours and then 40 minutes prior to arrival here in the emerged department  She states that she has some minimal epigastric discomfort at this time  Patient states that she has right shoulder pain for approximately 2 months and has been taking Motrin for the discomfort  When asked to quantify how much Motrin she takes she is states that she is taking it many times a day"  Patient states that she vomits and there is stomach contents and bright red blood  History provided by:  Patient (Via  [id number])  Black or Bloody Stool   Quality:  Black and tarry  Amount:  Scant  Duration:  3 days  Timing:  Intermittent  Context: not anal fissures, not anal penetration and not constipation    Relieved by:  Nothing  Associated symptoms: no dizziness and no epistaxis    Risk factors: NSAID use    Risk factors: no anticoagulant use, no hx of colorectal cancer and no hx of colorectal surgery        Prior to Admission Medications   Prescriptions Last Dose Informant Patient Reported?  Taking?   acetaminophen (TYLENOL) 500 mg tablet   No No   Sig: Take 1 tablet (500 mg total) by mouth every 6 (six) hours as needed for mild pain, moderate pain, headaches or fever   albuterol (ACCUNEB) 1 25 MG/3ML nebulizer solution   No No   Sig: Take 3 mL (1 25 mg total) by nebulization every 6 (six) hours as needed for wheezing   albuterol (PROVENTIL HFA,VENTOLIN HFA) 90 mcg/act inhaler   No No   Sig: Inhale 2 puffs every 6 (six) hours as needed for wheezing   albuterol (PROVENTIL HFA,VENTOLIN HFA) 90 mcg/act inhaler   No No   Sig: Inhale 2 puffs every 4 (four) hours as needed for wheezing or shortness of breath   naproxen (NAPROSYN) 500 mg tablet   No No   Sig: Take 1 tablet (500 mg total) by mouth 2 (two) times a day with meals for 5 days      Facility-Administered Medications: None       Past Medical History:   Diagnosis Date    Asthma     Psychiatric disorder        History reviewed  No pertinent surgical history  History reviewed  No pertinent family history  I have reviewed and agree with the history as documented  Social History     Tobacco Use    Smoking status: Current Every Day Smoker     Types: Cigarettes    Smokeless tobacco: Never Used   Substance Use Topics    Alcohol use: Yes     Comment: socially     Drug use: No        Review of Systems   Constitutional: Negative for activity change, appetite change and chills  HENT: Negative for nosebleeds  Eyes: Negative for pain, discharge and itching  Respiratory: Negative for apnea, choking and chest tightness  Cardiovascular: Negative for chest pain and leg swelling  Gastrointestinal: Positive for blood in stool  Hematemesis   Endocrine: Negative for cold intolerance, heat intolerance and polydipsia  Genitourinary: Negative for difficulty urinating, dyspareunia and dysuria  Musculoskeletal: Negative for arthralgias and back pain  Skin: Negative for color change, pallor and rash  Allergic/Immunologic: Negative for environmental allergies and food allergies  Neurological: Negative for dizziness  Hematological: Negative for adenopathy  Psychiatric/Behavioral: Negative for agitation, behavioral problems and confusion  All other systems reviewed and are negative  Physical Exam  Physical Exam   Constitutional: She appears well-developed and well-nourished  HENT:   Head: Normocephalic     Right Ear: External ear normal    Left Ear: External ear normal    Eyes: Pupils are equal, round, and reactive to light  Right eye exhibits no discharge  Left eye exhibits no discharge  Neck: Normal range of motion  No tracheal deviation present  No thyromegaly present  Cardiovascular: Normal rate, regular rhythm and normal heart sounds  Pulmonary/Chest: Effort normal  No stridor  No respiratory distress  She has no wheezes  Abdominal: She exhibits no mass  There is tenderness  There is no rebound and no guarding  Minimal tenderness to palpation the epigastric region   Genitourinary: Rectal exam shows guaiac positive stool  Genitourinary Comments: Heme-positive stool obtained at bedside with the nurse in the room   Musculoskeletal: She exhibits no edema  Neurological: She is alert  She displays normal reflexes  No cranial nerve deficit  She exhibits normal muscle tone  Coordination normal    Skin: Capillary refill takes less than 2 seconds  No erythema  Psychiatric: She has a normal mood and affect  Her behavior is normal  Thought content normal    Vitals reviewed        Vital Signs  ED Triage Vitals [02/28/19 1846]   Temperature Pulse Respirations Blood Pressure SpO2   (!) 97 4 °F (36 3 °C) 96 18 132/74 100 %      Temp Source Heart Rate Source Patient Position - Orthostatic VS BP Location FiO2 (%)   Oral Monitor Sitting Right arm --      Pain Score       Worst Possible Pain           Vitals:    02/28/19 1846 02/28/19 2053   BP: 132/74 111/70   Pulse: 96 71   Patient Position - Orthostatic VS: Sitting Lying       Visual Acuity      ED Medications  Medications   sodium chloride 0 9 % bolus 1,000 mL (1,000 mL Intravenous New Bag 2/28/19 2040)   pantoprazole (PROTONIX) 80 mg in sodium chloride 0 9 % 100 mL infusion (has no administration in time range)   potassium chloride 20 mEq IVPB (premix) (has no administration in time range)   LORazepam (ATIVAN) 2 mg/mL injection 0 5 mg (has no administration in time range)   lidocaine (LIDODERM) 5 % patch 1 patch (has no administration in time range) pantoprazole (PROTONIX) 80 mg in sodium chloride 0 9 % 100 mL IVPB (80 mg Intravenous New Bag 2/28/19 2040)   ondansetron (ZOFRAN) injection 4 mg (4 mg Intravenous Given 2/28/19 2037)       Diagnostic Studies  Results Reviewed     Procedure Component Value Units Date/Time    Comprehensive metabolic panel [[id number]]  (Abnormal) Collected:  02/28/19 1957    Lab Status:  Final result Specimen:  Blood from Arm, Right Updated:  02/28/19 2050     Sodium 142 mmol/L      Potassium 3 3 mmol/L      Chloride 104 mmol/L      CO2 28 mmol/L      ANION GAP 10 mmol/L      BUN 9 mg/dL      Creatinine 0 82 mg/dL      Glucose 74 mg/dL      Calcium 8 6 mg/dL      AST 15 U/L      ALT 21 U/L      Alkaline Phosphatase 107 U/L      Total Protein 7 7 g/dL      Albumin 3 8 g/dL      Total Bilirubin 0 19 mg/dL      eGFR 93 ml/min/1 73sq m     Narrative:       National Kidney Disease Education Program recommendations are as follows:  GFR calculation is accurate only with a steady state creatinine  Chronic Kidney disease less than 60 ml/min/1 73 sq  meters  Kidney failure less than 15 ml/min/1 73 sq  meters  Lipase [[id number]]  (Normal) Collected:  02/28/19 1957    Lab Status:  Final result Specimen:  Blood from Arm, Right Updated:  02/28/19 2050     Lipase 113 u/L     Lactic acid, plasma [[id number]]  (Normal) Collected:  02/28/19 1957    Lab Status:  Final result Specimen:  Blood from Arm, Right Updated:  02/28/19 2037     LACTIC ACID 0 9 mmol/L     Narrative:       Result may be elevated if tourniquet was used during collection      Troponin I [[id number]]  (Normal) Collected:  02/28/19 1957    Lab Status:  Final result Specimen:  Blood from Arm, Left Updated:  02/28/19 2036     Troponin I <0 02 ng/mL     Protime-INR [[id number]]  (Normal) Collected:  02/28/19 1957    Lab Status:  Final result Specimen:  Blood from Arm, Right Updated:  02/28/19 2034     Protime 12 5 seconds      INR 0 92    APTT [[id number]]  (Normal) Collected:  02/28/19 1957 Lab Status:  Final result Specimen:  Blood from Arm, Right Updated:  02/28/19 2034     PTT 32 seconds     CBC and differential [[id number]]  (Abnormal) Collected:  02/28/19 1957    Lab Status:  Final result Specimen:  Blood from Arm, Right Updated:  02/28/19 2015     WBC 7 73 Thousand/uL      RBC 4 49 Million/uL      Hemoglobin 13 2 g/dL      Hematocrit 41 1 %      MCV 92 fL      MCH 29 4 pg      MCHC 32 1 g/dL      RDW 13 7 %      MPV 9 6 fL      Platelets 876 Thousands/uL      nRBC 0 /100 WBCs      Neutrophils Relative 53 %      Immat GRANS % 0 %      Lymphocytes Relative 34 %      Monocytes Relative 7 %      Eosinophils Relative 4 %      Basophils Relative 2 %      Neutrophils Absolute 4 14 Thousands/µL      Immature Grans Absolute 0 02 Thousand/uL      Lymphocytes Absolute 2 60 Thousands/µL      Monocytes Absolute 0 53 Thousand/µL      Eosinophils Absolute 0 32 Thousand/µL      Basophils Absolute 0 12 Thousands/µL     POCT pregnancy, urine [[id number]]  (Normal) Resulted:  02/28/19 2005    Lab Status:  Final result Updated:  02/28/19 2006     EXT PREG TEST UR (Ref: Negative) negative                 XR abdomen obstruction series   ED Interpretation by Kelly Arias DO (02/28 2042)   No free air non specific bowel gas pattern      XR humerus RIGHT   ED Interpretation by Kelly Arias DO (02/28 2042)   No fracture retained bullet fragments      XR shoulder 2+ views RIGHT   ED Interpretation by Kelly Arias DO (02/28 2042)   No fracture retained bullet fragments                 Procedures  Procedures       Phone Contacts  ED Phone Contact    ED Course         HEART Risk Score      Most Recent Value   History  1 Filed at: 02/28/2019 2006   ECG  0 Filed at: 02/28/2019 2006   Age  0 Filed at: 02/28/2019 2006   Risk Factors  0 Filed at: 02/28/2019 2006   Troponin  0 Filed at: 02/28/2019 2006   Heart Score Risk Calculator   History  1 Filed at: 02/28/2019 2006   ECG  0 Filed at: 02/28/2019 2006   Age  0 Filed at: 02/28/2019 2006   Risk Factors  0 Filed at: 02/28/2019 2006   Troponin  0 Filed at: 02/28/2019 2006   HEART Score  1 Filed at: 02/28/2019 2006   HEART Score  1 Filed at: 02/28/2019 2006                            [facility]  Number of Diagnoses or Management Options  Diagnosis management comments: 55-year-old female who has been taking NSAIDs for the last several weeks secondary to chronic right shoulder pain presents after multiple episodes of vomiting bright red blood  Will attempt to place an NG tube  I will place patient on Protonix bolus and drip  I will obtain an obstruction series and admit the patient  Differential diagnosis 1  Upper GI bleed- esophagitis versus gastritis versus duodenitis  Patient at this time does not appear to be in any acute distress        Amount and/or Complexity of Data Reviewed  Clinical lab tests: reviewed  Tests in the radiology section of CPT®: reviewed  Tests in the medicine section of CPT®: reviewed    Risk of Complications, Morbidity, and/or Mortality  Presenting problems: high  Diagnostic procedures: high  Management options: high        Disposition  Final diagnoses:   Hematemesis with nausea   GI bleed   Right shoulder pain   Hypokalemia     Time reflects when diagnosis was documented in both MDM as applicable and the Disposition within this note     Time User Action Codes Description Comment    2/28/2019  7:40 PM [place] Chaya Add [K92 0] Hematemesis with nausea     2/28/2019  7:40 PM [place] Chaya Add [K92 2] GI bleed     2/28/2019  7:40 PM [place] Chaya Add [V62 755] Right shoulder pain     2/28/2019  8:55 PM [place] Chaya Add [E87 6] Hypokalemia       ED Disposition     ED Disposition Condition Date/Time Comment    Admit Stable Thu Feb 28, 2019  7:40 PM         Follow-up Information    None         Patient's Medications   Discharge Prescriptions    No medications on file     No discharge procedures on file      ED Provider  Electronically Signed by           Gwendolyn Garcia, DO  02/28/19 6937       Gwendolyn Garcia, DO  02/28/19 8066

## 2019-03-01 NOTE — PROGRESS NOTES
Pt arrived to unit in stable condition, A&Ox4  Upon arrival, pt observed shouting "I don't want this stupid potassium, it burns!" Pt was brought up with a potassium and Protonix gtt infusing  Potassium placed on hold  During assessment, pt stated that she takes Mylan and Benadryl at home for sleep  [address] notified, pt made aware that hospital does not care Mylan (per NP) and would have Xanax ordered instead, verbalized understanding and was agreeable to medication  PO K+ ordered in place of IV K+, taken with ease  Pt refused xanax and benadryl upon arrival with meds  Pt stated that she was going to have her  bring her meds in and ws going to take them herself  Despite education pt became agitated and began raising her voice stating "I'm not giving my pills to anyone " Charge Nurse [address] [id number] made aware, Charge nurse spoke with and reeducated pt who was agreeable to taking PO Benadryl following an outburst of request for IV Benadryl  Pt requested tylenol for pain at 0130, upon arrival pt refused medication  At 0228 pt noted to be in bathroom with IV Protonix currently infusing and undressing herself attempting to shower  Pt made aware that a shower order needed to be obtained  Pt and  educated on safety as pt was already getting into the shower at this time  IV Medication infusion stopped/held  Pt non compliant  Safety maintained as much as possible

## 2019-03-01 NOTE — ED PROCEDURE NOTE
PROCEDURE  CriticalCare Time  Performed by: Mariann Hinds DO  Authorized by: Mariann Hinds DO     Critical care provider statement:     Critical care time (minutes):  35    Critical care was necessary to treat or prevent imminent or life-threatening deterioration of the following conditions: GI bleed      Critical care was time spent personally by me on the following activities:  Evaluation of patient's response to treatment, examination of patient and ordering and review of laboratory studies         Mariann Hinds DO  02/28/19 1934

## 2019-03-01 NOTE — CONSULTS
Patient MRN: [id number]  Date of Service: 3/1/2019  Referring Physician: Kiara Gary  Provider Creating Note: GIORGIO James  PCP: No primary care provider on file  Reason for Consult:  Hematemesis  HPI  Cady Tineo is a 26 y o  female who was admitted with Hematemesis  She presented to the ER with chief complaint of vomiting blood with occasional clots for the past 3 days  She also complained of midepigastric pain as well as right left upper quadrant abdominal pain  Patient has a past medical history of chronic shoulder and back pain  She had been taking Motrin and aspirin for the pain for the past 2 months  She will take 1 full-strength aspirin up to twice a day  as well as Motrin up to 4 times a day  She denies any bright red blood per rectum or melena and  no lower abdominal pain  Patient's  was present for translation  Past Medical History:   Diagnosis Date    Asthma     Psychiatric disorder      History reviewed  No pertinent surgical history  Medications  Home Medications:   Prior to Admission medications    Medication Sig Start Date End Date Taking?  Authorizing Provider   acetaminophen (TYLENOL) 500 mg tablet Take 1 tablet (500 mg total) by mouth every 6 (six) hours as needed for mild pain, moderate pain, headaches or fever 8/12/18   Davy Tamayo PA-C   albuterol (ACCUNEB) 1 25 MG/3ML nebulizer solution Take 3 mL (1 25 mg total) by nebulization every 6 (six) hours as needed for wheezing 9/9/18 9/9/19  Nevaeh Vang PA-C   albuterol (PROVENTIL HFA,VENTOLIN HFA) 90 mcg/act inhaler Inhale 2 puffs every 6 (six) hours as needed for wheezing 9/9/18 9/9/19  Nevaeh Vang PA-C   albuterol (PROVENTIL HFA,VENTOLIN HFA) 90 mcg/act inhaler Inhale 2 puffs every 4 (four) hours as needed for wheezing or shortness of breath 11/5/18   Holly Hollis DO   naproxen (NAPROSYN) 500 mg tablet Take 1 tablet (500 mg total) by mouth 2 (two) times a day with meals for 5 days 9/23/18 9/28/18  Rebel Solorzano,        Inhouse Medications    Current Facility-Administered Medications:     acetaminophen (TYLENOL) tablet 650 mg, 650 mg, Oral, Q6H PRN    baclofen tablet 10 mg, 10 mg, Oral, BID, 10 mg at 03/01/19 0910    celecoxib (CeleBREX) capsule 100 mg, 100 mg, Oral, BID, 100 mg at 03/01/19 0910    lidocaine (LIDODERM) 5 % patch 2 patch, 2 patch, Topical, Daily, 2 patch at 03/01/19 0906    nicotine (NICODERM CQ) 21 mg/24 hr TD 24 hr patch 21 mg, 21 mg, Transdermal, Daily, 21 mg at 03/01/19 0907    pantoprazole (PROTONIX) 80 mg in sodium chloride 0 9 % 100 mL infusion, 8 mg/hr, Intravenous, Continuous, 8 mg/hr at 02/28/19 2210    pantoprazole (PROTONIX) 80 mg in sodium chloride 0 9 % 100 mL infusion, 8 mg/hr, Intravenous, Continuous    potassium chloride 20 mEq IVPB (premix), 20 mEq, Intravenous, Once, Stopped at 02/28/19 2147    Allergies  No Known Allergies  Social History   reports that she has been smoking cigarettes  She has been smoking about 2 00 packs per day  She has never used smokeless tobacco  She reports that she drinks alcohol  She reports that she does not use drugs  Family History  History reviewed  No pertinent family history  ROS  ROS: Denies CP, SOB, fever  Positive for upper abdominal pain, positive for hematemesis  All others negative except as noted in HPI  Objective   Vitals  Blood pressure 102/55, pulse 67, temperature 97 9 °F (36 6 °C), temperature source Temporal, resp  rate 18, weight 70 9 kg (156 lb 6 4 oz), last menstrual period 02/22/2019, SpO2 97 %    [unfilled]    Laboratory Studies  Lab Results   Component Value Date    WBC 7 73 02/28/2019    HGB 12 0 03/01/2019    HCT 37 3 03/01/2019     02/28/2019    MCV 92 02/28/2019     Lab Results   Component Value Date    CREATININE 0 69 03/01/2019    BUN 9 03/01/2019    SODIUM 138 03/01/2019    K 4 3 03/01/2019     03/01/2019    CO2 23 03/01/2019    CALCIUM 8 5 03/01/2019    ALKPHOS 107 02/28/2019 AST 15 02/28/2019    ALT 21 02/28/2019     Lab Results   Component Value Date    PROTIME 12 5 02/28/2019    INR 0 92 02/28/2019       Imaging and Other Studies    Assessment and Plan:  1  Hematemesis in patient with excessive NSAID use  Also noted to have significant midepigastric and right left upper quadrant abdominal pain  Patient will be scheduled for an EGD today  Risks and benefits of the procedure including bleeding, aspiration and perforation were discussed with with the patient  Her  acted as   Ppi IV b i d  NPO   Further recommendations will follow the endoscopy      Principal Problem:    Hematemesis  Active Problems:    Hypokalemia    Shoulder pain    GI bleed    Hematemesis with nausea      GIORGIO Vale
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
## 2019-03-01 NOTE — ASSESSMENT & PLAN NOTE
Concerning for erosive esophagitis vs hemorrhagic gastritis  S/sx on set for 3 days w/o anemia  Consider steve moody tear but pt has not been having dry heaving or vomiting prior to onset of hematemesis in setting of nsaid overuse  Start clear liquid diet, monitor h/hs q 6h, will continue protonix gtt for now, likely will need to be on ppi 40mg daily upon d/c and if no improvement op f/u with GI    If pt develops significant anemia may need further monitoring and GI consultation

## 2019-03-01 NOTE — UTILIZATION REVIEW
Initial Clinical Review    Admission: Date/Time/Statement: 2/28/19 @     2134     02/28/19 2134  Place in Observation Once     Transfer Service: General Medicine       Question Answer Comment   Admitting Physician Asiya Coronado    Level of Care Med Surg       Start Status    02/28/19 2134 Completed Details       02/28/19 2133                   Chief Complaint:   Chief Complaint   Patient presents with    Arm Pain     Pt reports R arm pain x 3days reports heavy lifting at work   Vomiting Blood     Also reports 2 episodes of bloody emesis and "blood cam out of my nose", abdominal pain for days, denies fevers, denies urinary symptoms  History of Illness:   Pt  Presents to ED   From home  With spouse  Pt is  [place]ic speaking  Only  C/O bloody  Emesis  With occ clots for past 3 days  Has mild epigastric  Discomfort  And nausea  Has  Noticed blood  Running  Out of her nose  With emesis  +  Lightheadedness with standing       C/O  R  Shoulder pain and back pain for past  2 months  Has been taking  ASA and motrin, up to  4  Tabs ea   2 X  Daily  Has not  Been seen for this  Problem  Pain is  Mid back and R  Lumbar region, around  L1/L2, primarily  In soft tissue,  Having  Difficulty managing  Pain      ED Vital Signs:   ED Triage Vitals [02/28/19 1846]   Temperature Pulse Respirations Blood Pressure SpO2   (!) 97 4 °F (36 3 °C) 96 18 132/74 100 %      Temp Source Heart Rate Source Patient Position - Orthostatic VS BP Location FiO2 (%)   Oral Monitor Sitting Right arm --      Pain Score       Worst Possible Pain        Wt Readings from Last 1 Encounters:   02/28/19 70 9 kg (156 lb 6 4 oz)     Vital Signs (abnormal):    above    Pertinent Labs/Diagnostic Test Results:    K  3 3  H/H    13 2/41 1   (  2/28)                12/37 3     (  3/1)  Obstr  Series:  Non specific   Bowel gas pattern      No free air  X ray  R  Humerus/R  shoulder   No fracture    ED Treatment:   Medication Administration from 02/28/2019 1836 to 02/28/2019 2251       Date/Time Order Dose Route Action Action by Comments     02/28/2019 2140 sodium chloride 0 9 % bolus 1,000 mL 0 mL Intravenous Stopped Gerard Kent RN      02/28/2019 2040 sodium chloride 0 9 % bolus 1,000 mL 1,000 mL Intravenous [address] Charu Mcclellan RN      02/28/2019 2106 pantoprazole (PROTONIX) 80 mg in sodium chloride 0 9 % 100 mL IVPB 0 mg Intravenous Stopped Charu Mcclellan RN      02/28/2019 2040 pantoprazole (PROTONIX) 80 mg in sodium chloride 0 9 % 100 mL IVPB 80 mg Intravenous [address] Charu Mcclellan RN      02/28/2019 2210 pantoprazole (PROTONIX) 80 mg in sodium chloride 0 9 % 100 mL infusion 8 mg/hr Intravenous Restarted Gerard Kent RN Switched to IV in L ac per pt request      02/28/2019 2037 ondansetron (ZOFRAN) injection 4 mg 4 mg Intravenous Given Charu Mcclellan RN      02/28/2019 2147 potassium chloride 20 mEq IVPB (premix) 0 mEq Intravenous Stopped Roberto Batista RN Pt refused  Stated that K+ burns and does not want to try slower rate  Ordered PO K+      02/28/2019 2139 LORazepam (ATIVAN) 2 mg/mL injection 0 5 mg 0 5 mg Intravenous Given Gerard Kent RN      02/28/2019 2137 lidocaine (LIDODERM) 5 % patch 1 patch 1 patch Topical Medication Applied Gerard Kent RN         Past Medical/Surgical History: Active Ambulatory Problems     Diagnosis Date Noted    No Active Ambulatory Problems     Resolved Ambulatory Problems     Diagnosis Date Noted    No Resolved Ambulatory Problems     Past Medical History:   Diagnosis Date    Asthma     Psychiatric disorder      Admitting Diagnosis: Hypokalemia [E87 6]  Arm pain [M79 603]  GI bleed [K92 2]  Right shoulder pain [M25 511]  Hematemesis with nausea [K92 0]  Age/Sex: 26 y o  female     Assessment/Plan: Hematemesis  Assessment & Plan  Concerning for erosive esophagitis vs hemorrhagic gastritis  S/sx on set for 3 days w/o anemia    Consider steve moody tear but pt has not been having dry heaving or vomiting prior to onset of hematemesis in setting of nsaid overuse  Start clear liquid diet, monitor h/hs q 6h, will continue protonix gtt for now, likely will need to be on ppi 40mg daily upon d/c and if no improvement op f/u with GI  If pt develops significant anemia may need further monitoring and GI consultation     Shoulder pain  Assessment & Plan  Consistent w/rotator cuff disease ongoing for 2 months w/concomittant low back pain w/o radiculopathy  Xray pending official read however no trauma or obvious dislocation  Would favor celebrex in favor of motrin/aspirin for pain relief  Likely would benefit from some op PT  Start baclofen and lidocaine for low back pain, acetaminophen, celebrex and ice/heat for shoulder pain     Hypokalemia  Assessment & Plan  Mild 2* n/v   Replete and repeat bmp in am  Anticipated Length of Stay:  Patient will be admitted on an Observation basis with an anticipated length of stay of  Less than 2 midnights     Justification for Hospital Stay: hematemesis w/o anemia               Admission Orders:   OBS  ORDER   2/28  @    6339  Scheduled Meds:   Current Facility-Administered Medications:  acetaminophen 650 mg Oral Q6H PRN Karissa Corea PA-C    baclofen 10 mg Oral BID Karissa Corea PA-C    celecoxib 100 mg Oral BID Karissa Corea PA-C    lidocaine 1 patch Topical Once [place] Sonali, DO    lidocaine 2 patch Topical Daily Karissa Corea PA-C    nicotine 21 mg Transdermal Daily GIORGIO Qureshi    pantoprozole (PROTONIX) infusion (Continuous) 8 mg/hr Intravenous Continuous [place] Sonali, DO Last Rate: 8 mg/hr (02/28/19 2210)   pantoprozole (PROTONIX) infusion (Continuous) 8 mg/hr Intravenous Continuous Karissa Corea PA-C    potassium chloride 20 mEq Intravenous Once [place] Sonali, DO Last Rate: Stopped (02/28/19 2147)     Continuous Infusions:   pantoprozole (PROTONIX) infusion (Continuous) 8 mg/hr Last Rate: 8 mg/hr (02/28/19 2210) pantoprozole (PROTONIX) infusion (Continuous) 8 mg/hr      PRN Meds:   acetaminophen     Cl liq  Diet  Cons GI    Per  Gi  Consult:   Hematemesis in patient with excessive NSAID use  Also noted to have significant midepigastric and right left upper quadrant abdominal pain  Patient will be scheduled for an EGD today  Risks and benefits of the procedure including bleeding, aspiration and perforation were discussed with with the patient  Her  acted as   Ppi IV b i d  NPO    EGD  Cancelled   3/1  ( pt  Ate)    Rescheduled  For  Mon  3/4            Network Utilization Review Department  Phone: [phone]; Fax [phone]  [email or web address]  ATTENTION: Please call with any questions or concerns to [phone]  and carefully listen to the prompts so that you are directed to the right person  Send all requests for admission clinical reviews, approved or denied determinations and any other requests to fax [phone]   All voicemails are confidential

## 2019-03-01 NOTE — ASSESSMENT & PLAN NOTE
Consistent w/rotator cuff disease ongoing for 2 months w/concomittant low back pain w/o radiculopathy  Xray pending official read however no trauma or obvious dislocation  Would favor celebrex in favor of motrin/aspirin for pain relief  Likely would benefit from some op PT  Start baclofen and lidocaine for low back pain, acetaminophen, celebrex and ice/heat for shoulder pain

## 2019-03-01 NOTE — ED NOTES
Spoke with Jered Tellez regarding patient's potassium infusion, okay to run NSS bolus with potassium for comfort       Yesenia Quesada RN  02/28/19 3458

## 2019-03-01 NOTE — PHYSICIAN ADVISOR
Current patient class: Observation  The patient is currently on Hospital Day: 2 at [facility]        The patient was admitted to the hospital  on 2/28/19 at  8:57 PM for the following diagnosis:  Hypokalemia [E87 6]  Arm pain [M79 603]  GI bleed [K92 2]  Right shoulder pain [M25 511]  Hematemesis with nausea [K92 0]     After review of the relevant documentation, labs, vital signs and test results, the patient is most appropriate for OBSERVATION STATUS  Rationale is as follows: The patient is a 26 yrs   Female who presented to the ED at 2/28/2019  6:47 PM with a chief complaint of Arm Pain (Pt reports R arm pain x 3days reports heavy lifting at work ) and Vomiting Blood (Also reports 2 episodes of bloody emesis and "blood cam out of my nose", abdominal pain for days, denies fevers, denies urinary symptoms )     The patient presented with hematemesis  Patient had a history of excessive NSAID use  The plan of care included NPO, intravenous proton pump inhibitor as well as GI consultation for endoscopy  The patient had eaten and was unable to undergo the endoscopy  The patient was treated for hyperkalemia and her labs improved  This patient at this point is appropriate for observation status, the endoscopy has been postponed till Monday the patient is on a clear liquid diet  Chart notes and medications reviewed      The patients vitals on arrival were ED Triage Vitals [02/28/19 1846]   Temperature Pulse Respirations Blood Pressure SpO2   (!) 97 4 °F (36 3 °C) 96 18 132/74 100 %      Temp Source Heart Rate Source Patient Position - Orthostatic VS BP Location FiO2 (%)   Oral Monitor Sitting Right arm --      Pain Score       Worst Possible Pain           Past Medical History:   Diagnosis Date    Asthma     Psychiatric disorder      Past Surgical History:   Procedure Laterality Date    NO PAST SURGERIES             Consults have been placed to:   IP CONSULT TO GASTROENTEROLOGY  IP CONSULT TO ANESTHESIOLOGY    Vitals:    03/01/19 0024 03/01/19 0759 03/01/19 1308 03/01/19 1527   BP: 111/71 102/55 120/76 133/73   BP Location: Right arm Right arm  Right arm   Pulse: 86 67 83 67   Resp: 18 18 16 16   Temp: (!) 97 4 °F (36 3 °C) 97 9 °F (36 6 °C) 98 4 °F (36 9 °C) 97 6 °F (36 4 °C)   TempSrc: Temporal Temporal Temporal Temporal   SpO2: 100% 97% 96% 97%   Weight:       Height:   5' 4" (1 626 m)        Most recent labs:    Recent Labs     02/28/19 1957 03/01/19  0633   WBC 7 73  --   --    HGB 13 2   < > 12 0   HCT 41 1   < > 37 3     --   --    K 3 3*  --  4 3   CALCIUM 8 6  --  8 5   BUN 9  --  9   CREATININE 0 82  --  0 69   LIPASE 113  --   --    INR 0 92  --   --    TROPONINI <0 02  --   --    AST 15  --   --    ALT 21  --   --    ALKPHOS 107  --   --     < > = values in this interval not displayed         Scheduled Meds:  Current Facility-Administered Medications:  acetaminophen 650 mg Oral Q6H PRN Flakito Andre MD    baclofen 10 mg Oral BID Flakito Andre MD    lidocaine 2 patch Topical Daily Flakito Andre MD    morphine injection 2 mg Intravenous Q4H PRN Clarita Zapata MD    nicotine 21 mg Transdermal Daily Flakito Andre MD    pantoprozole (PROTONIX) infusion (Continuous) 8 mg/hr Intravenous Continuous Flakito Andre MD Last Rate: 8 mg/hr (03/01/19 1517)   potassium chloride 20 mEq Intravenous Once Jean-Paul Eduardo PA-C Last Rate: Stopped (02/28/19 2147)   sodium chloride 125 mL/hr Intravenous Continuous Flakito Andre MD Last Rate: 125 mL/hr (03/01/19 1518)     Continuous Infusions:  pantoprozole (PROTONIX) infusion (Continuous) 8 mg/hr Last Rate: 8 mg/hr (03/01/19 1517)   sodium chloride 125 mL/hr Last Rate: 125 mL/hr (03/01/19 1518)     PRN Meds:   acetaminophen    morphine injection    Surgical procedures (if appropriate):  Procedure(s):  ESOPHAGOGASTRODUODENOSCOPY (EGD)

## 2019-03-01 NOTE — PLAN OF CARE
Problem: PAIN - ADULT  Goal: Verbalizes/displays adequate comfort level or baseline comfort level  Description  Interventions:  - Encourage patient to monitor pain and request assistance  - Assess pain using appropriate pain scale  - Administer analgesics based on type and severity of pain and evaluate response  - Implement non-pharmacological measures as appropriate and evaluate response  - Consider cultural and social influences on pain and pain management  - Notify physician/advanced practitioner if interventions unsuccessful or patient reports new pain  Outcome: Progressing     Problem: INFECTION - ADULT  Goal: Absence or prevention of progression during hospitalization  Description  INTERVENTIONS:  - Assess and monitor for signs and symptoms of infection  - Monitor lab/diagnostic results  - Monitor all insertion sites, i e  indwelling lines, tubes, and drains  - Monitor endotracheal (as able) and nasal secretions for changes in amount and color  - [place] appropriate cooling/warming therapies per order  - Administer medications as ordered  - Instruct and encourage patient and family to use good hand hygiene technique  - Identify and instruct in appropriate isolation precautions for identified infection/condition  Outcome: Progressing     Problem: SAFETY ADULT  Goal: Patient will remain free of falls  Description  INTERVENTIONS:  - Assess patient frequently for physical needs  -  Identify cognitive and physical deficits and behaviors that affect risk of falls    -  [place] fall precautions as indicated by assessment   - Educate patient/family on patient safety including physical limitations  - Instruct patient to call for assistance with activity based on assessment  - Modify environment to reduce risk of injury  - Consider OT/PT consult to assist with strengthening/mobility  Outcome: Progressing     Problem: DISCHARGE PLANNING  Goal: Discharge to home or other facility with appropriate resources  Description  INTERVENTIONS:  - Identify barriers to discharge w/patient and caregiver  - Arrange for needed discharge resources and transportation as appropriate  - Identify discharge learning needs (meds, wound care, etc )  - Arrange for interpretive services to assist at discharge as needed  - Refer to Case Management Department for coordinating discharge planning if the patient needs post-hospital services based on physician/advanced practitioner order or complex needs related to functional status, cognitive ability, or social support system  Outcome: Progressing     Problem: Knowledge Deficit  Goal: Patient/family/caregiver demonstrates understanding of disease process, treatment plan, medications, and discharge instructions  Description  Complete learning assessment and assess knowledge base  Interventions:  - Provide teaching at level of understanding  - Provide teaching via preferred learning methods  Outcome: Progressing     Problem: CARDIOVASCULAR - ADULT  Goal: Maintains optimal cardiac output and hemodynamic stability  Description  INTERVENTIONS:  - Monitor I/O, vital signs and rhythm  - Monitor for S/S and trends of decreased cardiac output i e  bleeding, hypotension  - Administer and titrate ordered vasoactive medications to optimize hemodynamic stability  - Assess quality of pulses, skin color and temperature  - Assess for signs of decreased coronary artery perfusion - ex   Angina  - Instruct patient to report change in severity of symptoms  Outcome: Progressing     Problem: GASTROINTESTINAL - ADULT  Goal: Minimal or absence of nausea and/or vomiting  Description  INTERVENTIONS:  - Administer IV fluids as ordered to ensure adequate hydration  - Maintain NPO status until nausea and vomiting are resolved  - Nasogastric tube as ordered  - Administer ordered antiemetic medications as needed  - Provide nonpharmacologic comfort measures as appropriate  - Advance diet as tolerated, if ordered  - Nutrition services referral to assist patient with adequate nutrition and appropriate food choices  Outcome: Progressing  Goal: Maintains adequate nutritional intake  Description  INTERVENTIONS:  - Monitor percentage of each meal consumed  - Identify factors contributing to decreased intake, treat as appropriate  - Assist with meals as needed  - Monitor I&O, WT and lab values  - Obtain nutrition services referral as needed  Outcome: Progressing     Problem: METABOLIC, FLUID AND ELECTROLYTES - ADULT  Goal: Fluid balance maintained  Description  INTERVENTIONS:  - Monitor labs and assess for signs and symptoms of volume excess or deficit  - Monitor I/O and WT  - Instruct patient on fluid and nutrition as appropriate  Outcome: Progressing     Problem: Nutrition/Hydration-ADULT  Goal: Nutrient/Hydration intake appropriate for improving, restoring or maintaining nutritional needs  Description  Monitor and assess patient's nutrition/hydration status for malnutrition (ex- brittle hair, bruises, dry skin, pale skin and conjunctiva, muscle wasting, smooth red tongue, and disorientation)  Collaborate with interdisciplinary team and initiate plan and interventions as ordered  Monitor patient's weight and dietary intake as ordered or per policy  Utilize nutrition screening tool and intervene per policy  Determine patient's food preferences and provide high-protein, high-caloric foods as appropriate       INTERVENTIONS:  - Monitor oral intake, urinary output, labs, and treatment plans  - Assess nutrition and hydration status and recommend course of action  - Evaluate amount of meals eaten  - Assist patient with eating if necessary   - Allow adequate time for meals  - Recommend/ encourage appropriate diets, oral nutritional supplements, and vitamin/mineral supplements  - Order, calculate, and assess calorie counts as needed  - Recommend, monitor, and adjust tube feedings and TPN/PPN based on assessed needs  - Assess need for intravenous fluids  - Provide specific nutrition/hydration education as appropriate  - Include patient/family/caregiver in decisions related to nutrition  Outcome: Progressing

## 2019-03-01 NOTE — PROGRESS NOTES
Procedure canceled prior to sedation because she said she had grapes and a strawberry before coming to the endo suite

## 2019-03-01 NOTE — ED PROCEDURE NOTE
PROCEDURE  ECG 12 Lead Documentation  Date/Time: 2/28/2019 8:06 PM  Performed by: Carmela Cordero DO  Authorized by: Carmela Cordero DO     Indications / Diagnosis:  GI bleed   ECG reviewed by me, the ED Provider: yes    Patient location:  ED  Previous ECG:     Previous ECG:  Unavailable  Interpretation:     Interpretation: non-specific    Rate:     ECG rate:  93    ECG rate assessment: normal    Rhythm:     Rhythm: sinus rhythm    ST segments:     ST segments:  Non-specific         Carmela Cordero DO  02/28/19 2007

## 2019-03-02 ENCOUNTER — APPOINTMENT (OUTPATIENT)
Dept: CT IMAGING | Facility: HOSPITAL | Age: 34
End: 2019-03-02
Payer: COMMERCIAL

## 2019-03-02 LAB
AMPHETAMINES SERPL QL SCN: NEGATIVE
BARBITURATES UR QL: NEGATIVE
BENZODIAZ UR QL: NEGATIVE
COCAINE UR QL: NEGATIVE
GLUCOSE SERPL-MCNC: 92 MG/DL (ref 65–140)
METHADONE UR QL: NEGATIVE
OPIATES UR QL SCN: POSITIVE
PCP UR QL: NEGATIVE
THC UR QL: NEGATIVE
TROPONIN I SERPL-MCNC: <0.02 NG/ML

## 2019-03-02 PROCEDURE — 99205 OFFICE O/P NEW HI 60 MIN: CPT | Performed by: PSYCHIATRY & NEUROLOGY

## 2019-03-02 PROCEDURE — 99224 PR SBSQ OBSERVATION CARE/DAY 15 MINUTES: CPT | Performed by: INTERNAL MEDICINE

## 2019-03-02 PROCEDURE — 70450 CT HEAD/BRAIN W/O DYE: CPT

## 2019-03-02 PROCEDURE — 82948 REAGENT STRIP/BLOOD GLUCOSE: CPT

## 2019-03-02 PROCEDURE — 93005 ELECTROCARDIOGRAM TRACING: CPT

## 2019-03-02 PROCEDURE — NC001 PR NO CHARGE: Performed by: NURSE PRACTITIONER

## 2019-03-02 PROCEDURE — C9113 INJ PANTOPRAZOLE SODIUM, VIA: HCPCS | Performed by: INTERNAL MEDICINE

## 2019-03-02 PROCEDURE — 80307 DRUG TEST PRSMV CHEM ANLYZR: CPT | Performed by: NURSE PRACTITIONER

## 2019-03-02 PROCEDURE — 84484 ASSAY OF TROPONIN QUANT: CPT | Performed by: INTERNAL MEDICINE

## 2019-03-02 RX ORDER — LORAZEPAM 2 MG/ML
1 INJECTION INTRAMUSCULAR ONCE
Status: COMPLETED | OUTPATIENT
Start: 2019-03-02 | End: 2019-03-02

## 2019-03-02 RX ORDER — LORAZEPAM 2 MG/ML
0.5 INJECTION INTRAMUSCULAR EVERY 6 HOURS PRN
Status: DISCONTINUED | OUTPATIENT
Start: 2019-03-02 | End: 2019-03-03 | Stop reason: HOSPADM

## 2019-03-02 RX ORDER — DIAZEPAM 5 MG/ML
INJECTION, SOLUTION INTRAMUSCULAR; INTRAVENOUS
Status: DISPENSED
Start: 2019-03-02 | End: 2019-03-03

## 2019-03-02 RX ADMIN — NICOTINE 21 MG: 21 PATCH TRANSDERMAL at 09:52

## 2019-03-02 RX ADMIN — MORPHINE SULFATE 2 MG: 2 INJECTION, SOLUTION INTRAMUSCULAR; INTRAVENOUS at 09:47

## 2019-03-02 RX ADMIN — SODIUM CHLORIDE 125 ML/HR: 0.9 INJECTION, SOLUTION INTRAVENOUS at 00:19

## 2019-03-02 RX ADMIN — LIDOCAINE 2 PATCH: 50 PATCH TOPICAL at 09:57

## 2019-03-02 RX ADMIN — SODIUM CHLORIDE 125 ML/HR: 0.9 INJECTION, SOLUTION INTRAVENOUS at 09:42

## 2019-03-02 RX ADMIN — LORAZEPAM 0.5 MG: 2 INJECTION INTRAMUSCULAR; INTRAVENOUS at 11:05

## 2019-03-02 RX ADMIN — LEVETIRACETAM 500 MG: 100 INJECTION, SOLUTION INTRAVENOUS at 15:44

## 2019-03-02 RX ADMIN — LORAZEPAM 1 MG: 2 INJECTION INTRAMUSCULAR; INTRAVENOUS at 15:30

## 2019-03-02 RX ADMIN — BACLOFEN 10 MG: 10 TABLET ORAL at 18:07

## 2019-03-02 RX ADMIN — MORPHINE SULFATE 2 MG: 2 INJECTION, SOLUTION INTRAMUSCULAR; INTRAVENOUS at 15:39

## 2019-03-02 RX ADMIN — BACLOFEN 10 MG: 10 TABLET ORAL at 09:41

## 2019-03-02 RX ADMIN — SODIUM CHLORIDE 8 MG/HR: 9 INJECTION, SOLUTION INTRAVENOUS at 01:22

## 2019-03-02 RX ADMIN — SODIUM CHLORIDE 8 MG/HR: 9 INJECTION, SOLUTION INTRAVENOUS at 15:28

## 2019-03-02 NOTE — TREATMENT PLAN
Rapid response was called at approximately 2:50 p m  On 3/2/2019 for patient with respiratory distress and evolved to tonic-clonic activity and now apparently postictal no apparent seizure history  Patient medication profile has been in relation treatment of her pain of her abdomen with morphine since yesterday and added lorazepam IV for anxiety today last dosage of which was 4 hours ago  According to significant other at bedside no prior history of seizures nor head trauma of consequence  Although there is a psych history apparently she has not been on any psychotropic drugs recently  I will place on Keppra loading and Ativan p r n  To continue and obtain CT scan of the brain and neurology consultation with neuro checks      She had subsequent 2nd presume seizure with tonic-clonic movements but is conscious conscious and was able to speak through these episodes which are now suggestive pseudoseizures and and not true seizure activity will obtain CT and urine drug screen nonetheless but will also add psychiatry consultation

## 2019-03-02 NOTE — PLAN OF CARE
Problem: PAIN - ADULT  Goal: Verbalizes/displays adequate comfort level or baseline comfort level  Description  Interventions:  - Encourage patient to monitor pain and request assistance  - Assess pain using appropriate pain scale  - Administer analgesics based on type and severity of pain and evaluate response  - Implement non-pharmacological measures as appropriate and evaluate response  - Consider cultural and social influences on pain and pain management  - Notify physician/advanced practitioner if interventions unsuccessful or patient reports new pain  Outcome: Progressing     Problem: INFECTION - ADULT  Goal: Absence or prevention of progression during hospitalization  Description  INTERVENTIONS:  - Assess and monitor for signs and symptoms of infection  - Monitor lab/diagnostic results  - Monitor all insertion sites, i e  indwelling lines, tubes, and drains  - Monitor endotracheal (as able) and nasal secretions for changes in amount and color  - [place] appropriate cooling/warming therapies per order  - Administer medications as ordered  - Instruct and encourage patient and family to use good hand hygiene technique  - Identify and instruct in appropriate isolation precautions for identified infection/condition  Outcome: Progressing     Problem: SAFETY ADULT  Goal: Patient will remain free of falls  Description  INTERVENTIONS:  - Assess patient frequently for physical needs  -  Identify cognitive and physical deficits and behaviors that affect risk of falls    -  [place] fall precautions as indicated by assessment   - Educate patient/family on patient safety including physical limitations  - Instruct patient to call for assistance with activity based on assessment  - Modify environment to reduce risk of injury  - Consider OT/PT consult to assist with strengthening/mobility  Outcome: Progressing     Problem: DISCHARGE PLANNING  Goal: Discharge to home or other facility with appropriate resources  Description  INTERVENTIONS:  - Identify barriers to discharge w/patient and caregiver  - Arrange for needed discharge resources and transportation as appropriate  - Identify discharge learning needs (meds, wound care, etc )  - Arrange for interpretive services to assist at discharge as needed  - Refer to Case Management Department for coordinating discharge planning if the patient needs post-hospital services based on physician/advanced practitioner order or complex needs related to functional status, cognitive ability, or social support system  Outcome: Progressing     Problem: Knowledge Deficit  Goal: Patient/family/caregiver demonstrates understanding of disease process, treatment plan, medications, and discharge instructions  Description  Complete learning assessment and assess knowledge base  Interventions:  - Provide teaching at level of understanding  - Provide teaching via preferred learning methods  Outcome: Progressing     Problem: CARDIOVASCULAR - ADULT  Goal: Maintains optimal cardiac output and hemodynamic stability  Description  INTERVENTIONS:  - Monitor I/O, vital signs and rhythm  - Monitor for S/S and trends of decreased cardiac output i e  bleeding, hypotension  - Administer and titrate ordered vasoactive medications to optimize hemodynamic stability  - Assess quality of pulses, skin color and temperature  - Assess for signs of decreased coronary artery perfusion - ex   Angina  - Instruct patient to report change in severity of symptoms  Outcome: Progressing     Problem: GASTROINTESTINAL - ADULT  Goal: Minimal or absence of nausea and/or vomiting  Description  INTERVENTIONS:  - Administer IV fluids as ordered to ensure adequate hydration  - Maintain NPO status until nausea and vomiting are resolved  - Nasogastric tube as ordered  - Administer ordered antiemetic medications as needed  - Provide nonpharmacologic comfort measures as appropriate  - Advance diet as tolerated, if ordered  - Nutrition services referral to assist patient with adequate nutrition and appropriate food choices  Outcome: Progressing  Goal: Maintains adequate nutritional intake  Description  INTERVENTIONS:  - Monitor percentage of each meal consumed  - Identify factors contributing to decreased intake, treat as appropriate  - Assist with meals as needed  - Monitor I&O, WT and lab values  - Obtain nutrition services referral as needed  Outcome: Progressing     Problem: METABOLIC, FLUID AND ELECTROLYTES - ADULT  Goal: Fluid balance maintained  Description  INTERVENTIONS:  - Monitor labs and assess for signs and symptoms of volume excess or deficit  - Monitor I/O and WT  - Instruct patient on fluid and nutrition as appropriate  Outcome: Progressing     Problem: Nutrition/Hydration-ADULT  Goal: Nutrient/Hydration intake appropriate for improving, restoring or maintaining nutritional needs  Description  Monitor and assess patient's nutrition/hydration status for malnutrition (ex- brittle hair, bruises, dry skin, pale skin and conjunctiva, muscle wasting, smooth red tongue, and disorientation)  Collaborate with interdisciplinary team and initiate plan and interventions as ordered  Monitor patient's weight and dietary intake as ordered or per policy  Utilize nutrition screening tool and intervene per policy  Determine patient's food preferences and provide high-protein, high-caloric foods as appropriate       INTERVENTIONS:  - Monitor oral intake, urinary output, labs, and treatment plans  - Assess nutrition and hydration status and recommend course of action  - Evaluate amount of meals eaten  - Assist patient with eating if necessary   - Allow adequate time for meals  - Recommend/ encourage appropriate diets, oral nutritional supplements, and vitamin/mineral supplements  - Order, calculate, and assess calorie counts as needed  - Recommend, monitor, and adjust tube feedings and TPN/PPN based on assessed needs  - Assess need for intravenous fluids  - Provide specific nutrition/hydration education as appropriate  - Include patient/family/caregiver in decisions related to nutrition  Outcome: Progressing
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
## 2019-03-02 NOTE — CONSULTS
Consultation - Neurology   Shanel Gambino 26 y o  female MRN: [id number]  Unit/Bed#: E5 -01 Encounter: [id number]      Assessment:  Seizure-like spells, either non-epileptic or epileptic  Some aspects such as purposeful resistance, reported communication during the spell, eye closure, and her psychiatric hx as well as strange affect with indifference after her spells do raise suspicion for non-epileptic events, however cannot entirely rule out epilepsy  Plan:  -close neurochecks  -HCT reviewed - no evidence of acute pathology or structural abnormality  -can give IV Ativan for recurrent spells that last for 3-5+ minutes  -discontinued Keppra as this would lower yield of diagnostics and likely would not be a good choice in the setting of her psychiatric hx  -if AED is desired/indicated, would prefer Depakote  -will obtain routine EEG  -UDS pending  -if EEG is concerning for epileptic activity, will consider initiating AED  -if spells are recurrent, will need to consider transfer to [facility] for continuous monitoring  -agree with Psychiatry consultation regarding her hx, medication compliance, and need for pharmacological treatment at this time      Reason for Consult / Principal Problem: seizure  Hx and PE limited by: language barrier    HPI: Shanel Gambino is a 26 y o  female with hx of anxiety, depression, and chronic back pain who presents with new onset seizure-like activity  She has been admitted for upper GI bleeding in setting of NSAID use for pain along with nausea/vomiting  She was started on a PPT gtt and was planned for EGD yesterday, however despite knowing this she had a meal and it had to be cancelled  She had been noted thus far to have episodes of "emily" where she is hyperactive and appears agitated, and often is described as "borderline" behavior where she will become demanding but other times be more calm and happy   Reportedly she had been on numerous prior psychiatric medications but had abruptly stopped them - according to family, this occurred years ago as opposed to recently but they cannot tell me which medications they were and there are no records of this in our system  She was started on Ativan earlier in the day for anxiety as well as morphine for pain  Today there was a rapid response called due to seizure-like activity  Per records and discussion with nurses present at the time as well as the patient/family, it appears that she was feeling short of breath and with some left sided chest pain immediately prior to the event along with anxiety  She then began to breathe heavily and according to nurses became tonic in which her back arched and then began shaking in all extremities  She appeared to be stiff  This resolved in about a minute or two and she had a brief period of confusion, after which she was able to respond appropriately  A few minutes later she had a second event that appeared similar to the first with stiffening and shaking of all extremities  Per the RRT and Dr Fausto Herring notes, it appears that she actively resisted when they attempted to use her arm, and it was documented that she communicated throughout the spell itself  She was given Ativan between spells and immediately prior to her second event  Her eyes were reportedly closed and her head was midline - she actively resisted attempts to open her eyes per nursing staff  She was given Keppra 500mg and taken to HCT, which was unremarkable  She has not had any further events since and is at her baseline  Inpatient consult to Neurology  Consult performed by: Rui Link DO  Consult ordered by: Suellen Kirkpatrick MD        Review of Systems   Constitutional: Positive for fatigue  Respiratory: Positive for shortness of breath  Cardiovascular: Positive for chest pain  Gastrointestinal: Positive for nausea  Musculoskeletal: Positive for back pain  Neurological: Positive for seizures     All other systems reviewed and are negative  Historical Information   Past Medical History:   Diagnosis Date    Asthma     Psychiatric disorder      Past Surgical History:   Procedure Laterality Date    NO PAST SURGERIES       Social History   Social History     Substance and Sexual Activity   Alcohol Use Yes    Comment: socially      Social History     Substance and Sexual Activity   Drug Use Not Currently     Social History     Tobacco Use   Smoking Status Current Every Day Smoker    Packs/day: 2 00    Types: Cigarettes   Smokeless Tobacco Never Used     Family History: History reviewed  No pertinent family history  Review of previous medical records was completed  Meds/Allergies   all current active meds have been reviewed, current meds:   Current Facility-Administered Medications   Medication Dose Route Frequency    acetaminophen (TYLENOL) tablet 650 mg  650 mg Oral Q6H PRN    baclofen tablet 10 mg  10 mg Oral BID    diazepam (VALIUM) 5 mg/mL injection **ADS Override Pull**        diphenhydrAMINE (BENADRYL) tablet 25 mg  25 mg Oral HS PRN    lidocaine (LIDODERM) 5 % patch 2 patch  2 patch Topical Daily    LORazepam (ATIVAN) 2 mg/mL injection 0 5 mg  0 5 mg Intravenous Q6H PRN    melatonin tablet 6 mg  6 mg Oral HS    morphine injection 2 mg  2 mg Intravenous Q4H PRN    nicotine (NICODERM CQ) 21 mg/24 hr TD 24 hr patch 21 mg  21 mg Transdermal Daily    pantoprazole (PROTONIX) 80 mg in sodium chloride 0 9 % 100 mL infusion  8 mg/hr Intravenous Continuous    potassium chloride 20 mEq IVPB (premix)  20 mEq Intravenous Once    sodium chloride 0 9 % infusion  75 mL/hr Intravenous Continuous    and PTA meds:   Prior to Admission Medications   Prescriptions Last Dose Informant Patient Reported?  Taking?   acetaminophen (TYLENOL) 500 mg tablet   No No   Sig: Take 1 tablet (500 mg total) by mouth every 6 (six) hours as needed for mild pain, moderate pain, headaches or fever   albuterol (ACCUNEB) 1 25 MG/3ML nebulizer solution   No No   Sig: Take 3 mL (1 25 mg total) by nebulization every 6 (six) hours as needed for wheezing   albuterol (PROVENTIL HFA,VENTOLIN HFA) 90 mcg/act inhaler   No No   Sig: Inhale 2 puffs every 6 (six) hours as needed for wheezing   albuterol (PROVENTIL HFA,VENTOLIN HFA) 90 mcg/act inhaler   No No   Sig: Inhale 2 puffs every 4 (four) hours as needed for wheezing or shortness of breath   naproxen (NAPROSYN) 500 mg tablet   No No   Sig: Take 1 tablet (500 mg total) by mouth 2 (two) times a day with meals for 5 days      Facility-Administered Medications: None       No Known Allergies    Objective   Vitals:Blood pressure 98/57, pulse 67, temperature 98 1 °F (36 7 °C), temperature source Temporal, resp  rate 16, height 5' 4" (1 626 m), weight 70 9 kg (156 lb 6 4 oz), last menstrual period 02/22/2019, SpO2 93 %  ,Body mass index is 26 85 kg/m²  Intake/Output Summary (Last 24 hours) at 3/2/2019 1652  Last data filed at 3/2/2019 0018  Gross per 24 hour   Intake 1000 ml   Output    Net 1000 ml       Invasive Devices: Invasive Devices     Peripheral Intravenous Line            Peripheral IV 03/01/19 Left Arm 1 day                Physical Exam   Constitutional: She appears well-developed and well-nourished  HENT:   Head: Normocephalic and atraumatic  Eyes: Pupils are equal, round, and reactive to light  EOM are normal    Neck: Normal range of motion  Neck supple  Cardiovascular: Normal rate, regular rhythm and normal heart sounds  Pulmonary/Chest: Effort normal and breath sounds normal    Abdominal: Soft  Bowel sounds are normal    Neurological: She is alert  She has normal strength  She has a normal Finger-Nose-Finger Test and a normal Heel to [employer]    Psychiatric: Her speech is normal      Neurologic Exam     Mental Status   Attention: normal  Concentration: normal    Speech: speech is normal   Level of consciousness: alert  Knowledge: good  Able to name object   Able to repeat  Normal comprehension  Oriented to self and location  Stated "February" and thought it was Friday  Strange affect, often joking instead of participating in exam or following commands     Cranial Nerves     CN II   Visual fields full to confrontation  CN III, IV, VI   Pupils are equal, round, and reactive to light  Extraocular motions are normal      CN V   Facial sensation intact  CN VII   Facial expression full, symmetric  CN VIII   Hearing: intact    CN IX, X   Palate: symmetric    CN XI   Right sternocleidomastoid strength: normal  Left sternocleidomastoid strength: normal  Right trapezius strength: normal  Left trapezius strength: normal    CN XII   Tongue deviation: none    Motor Exam     Strength   Strength 5/5 throughout  Sensory Exam   Light touch normal      Gait, Coordination, and Reflexes     Coordination   Finger to nose coordination: normal  Heel to shin coordination: normal    Reflexes   Reflexes 2+ except as noted  Lab Results:   CBC:   Results from last 7 days   Lab Units 03/01/19 0633 03/01/19 0029 02/28/19 1957   WBC Thousand/uL  --   --  7 73   RBC Million/uL  --   --  4 49   HEMOGLOBIN g/dL 12 0 11 9 13 2   HEMATOCRIT % 37 3 37 1 41 1   MCV fL  --   --  92   PLATELETS Thousands/uL  --   --  361   , BMP/CMP:   Results from last 7 days   Lab Units 03/01/19 0633 02/28/19 1957   SODIUM mmol/L 138 142   POTASSIUM mmol/L 4 3 3 3*   CHLORIDE mmol/L 106 104   CO2 mmol/L 23 28   BUN mg/dL 9 9   CREATININE mg/dL 0 69 0 82   CALCIUM mg/dL 8 5 8 6   AST U/L  --  15   ALT U/L  --  21   ALK PHOS U/L  --  107   EGFR ml/min/1 73sq m 113 93     Imaging Studies: I have personally reviewed pertinent films in PACS  EKG, Pathology, and Other Studies: I have personally reviewed pertinent reports      VTE Prophylaxis: Sequential compression device (Venodyne)     Code Status: Level 1 - Full Code
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
## 2019-03-02 NOTE — RAPID RESPONSE
Progress Note - Rapid Response   Antonio Forde 26 y o  female MRN: [id number]    Time Called ( Time): 1540  Date Called: 3/2/19  Level of Care: MS  Room#: 235  ZRLDUSV Time ( Time): 4619  Event End Time ( Time): 6854  MEWS score at time of Rapid Response: 1  Primary reason for call: Other Pseudo seizure  Interventions:  Airway/Breathing:  O2 Mask/Nasal  Circulation: N/A  Other Treatments: IV Ativan       Assessment:   1  Pseudo seizure    Plan:   · 1 mg of Ativan given IV  · Dr Hardy responded and assumed control  HPI/Chief Complaint (Background/Situation):   Antonio Forde is a 26y o  year old female who was initially admitted secondary to hematemesis likely due to NSAID use  GI was consulted for an EGD and she was placed on a proton pump inhibitor  Patient missed EGD yesterday secondary to eating a meal and was scheduled to undergo EGD today  Patient remained hemodynamically stable, and maintained an adequate hemoglobin  A rapid response was called today as the patient appeared to be demonstrating seizure-like activity  Patient was given 1 mg of Ativan and appeared to be entering a postictal period  However, shortly after receiving the IV Ativan the patient began to demonstrate seizure activity again  Attempting to move the patient's arm was met with resistance  Dr Bridgette Choudhary ordered a Keppra load, CT of the head, and consulted Neurology  I will check a urine drug screen as the patient's room smelled distinctly like marijuana  Dr Bridgette Choudhary assumed control of the situation              Historical Information   Past Medical History:   Diagnosis Date    Asthma     Psychiatric disorder      Past Surgical History:   Procedure Laterality Date    NO PAST SURGERIES       Social History   Social History     Substance and Sexual Activity   Alcohol Use Yes    Comment: socially      Social History     Substance and Sexual Activity   Drug Use Not Currently     Social History     Tobacco Use   Smoking Status Current Every Day Smoker    Packs/day: 2 00    Types: Cigarettes   Smokeless Tobacco Never Used     Family History: non-contributory    Meds/Allergies     Current Facility-Administered Medications:  acetaminophen 650 mg Oral Q6H PRN Alvertangella Carbajal MD    baclofen 10 mg Oral BID Alverta MD Solomon    diazepam        diphenhydrAMINE 25 mg Oral HS PRN Josep Epperson PA-C    levETIRAcetam 500 mg Intravenous Q12H [facility] Benoit Coon MD    lidocaine 2 patch Topical Daily Alverta Solomon, MD    LORazepam 0 5 mg Intravenous Q6H PRN Benoit Coon MD    LORazepam 1 mg Intravenous Once GIORGIO Pineda    melatonin 6 mg Oral HS Karissa Corea PA-C    morphine injection 2 mg Intravenous Q4H PRN Benoit Coon MD    nicotine 21 mg Transdermal Daily Alverta Solomon, MD    pantoprozole (PROTONIX) infusion (Continuous) 8 mg/hr Intravenous Continuous Danielle Carbajal MD Last Rate: Stopped (03/02/19 1445)   potassium chloride 20 mEq Intravenous Once Josep Epperson PA-C Last Rate: Stopped (02/28/19 2147)   sodium chloride 75 mL/hr Intravenous Continuous Benoit Coon MD Last Rate: 75 mL/hr (03/02/19 1053)         pantoprozole (PROTONIX) infusion (Continuous) 8 mg/hr Last Rate: Stopped (03/02/19 1445)   sodium chloride 75 mL/hr Last Rate: 75 mL/hr (03/02/19 1053)       No Known Allergies    ROS:  Unable to obtain    Vitals:  98 1, 67, 16, 98/57, 93% on room air    Physical Exam:  Gen:  Appears to be having seizures  HEENT:  Atraumatic normocephalic pupils equal round reactive to light extraocular muscles intact sclerae anicteric oral mucosa is pink and moist  Neck:  Supple no JVD no lymphadenopathy  Chest:  Clear to auscultation bilaterally respirations even and nonlabored  Cor:  Regular rate and rhythm no murmurs rubs or gallops appreciated  Abd:  Soft nontender with positive bowel sounds  Ext:  No clubbing cyanosis or edema  Neuro:  Seizure activity  Skin:  Warm dry and intact      Intake/Output Summary (Last 24 hours) at 3/2/2019 1527  Last data filed at 3/2/2019 0018  Gross per 24 hour   Intake 1000 ml   Output    Net 1000 ml       Respiratory    Lab Data (Last 4 hours)    None         O2/Vent Data (Last 4 hours)    None              Invasive Devices     Peripheral Intravenous Line            Peripheral IV 03/01/19 Left Arm 1 day                DIAGNOSTIC DATA:    Lab: I have personally reviewed pertinent lab results  CBC:   Results from last 7 days   Lab Units 03/01/19 0633  02/28/19 1957   WBC Thousand/uL  --   --  7 73   HEMOGLOBIN g/dL 12 0   < > 13 2   HEMATOCRIT % 37 3   < > 41 1   PLATELETS Thousands/uL  --   --  361    < > = values in this interval not displayed  CMP:   Results from last 7 days   Lab Units 03/01/19 0633 02/28/19 1957   POTASSIUM mmol/L 4 3 3 3*   CHLORIDE mmol/L 106 104   CO2 mmol/L 23 28   BUN mg/dL 9 9   CREATININE mg/dL 0 69 0 82   CALCIUM mg/dL 8 5 8 6   ALK PHOS U/L  --  107   ALT U/L  --  21   AST U/L  --  15     PT/INR:   No results found for: PT, INR,   Magnesium: No components found for: MAG,   Phosphorous: No results found for: PHOS    Microbiology:  No results found for: DONNA De Los SantosCULTJASON      OUTCOME:   Stayed in room   Family notified of transfer:   Family member contacted:  Family members in the room  Code Status: Level 1 - Full Code  Critical Care Time: Total Critical Care time spent 20 minutes excluding procedures, teaching and family updates

## 2019-03-03 VITALS
WEIGHT: 156.4 LBS | BODY MASS INDEX: 26.7 KG/M2 | SYSTOLIC BLOOD PRESSURE: 116 MMHG | TEMPERATURE: 98 F | OXYGEN SATURATION: 100 % | HEART RATE: 102 BPM | DIASTOLIC BLOOD PRESSURE: 77 MMHG | HEIGHT: 64 IN | RESPIRATION RATE: 14 BRPM

## 2019-03-03 PROBLEM — R56.9 WITNESSED SEIZURE-LIKE ACTIVITY (HCC): Status: ACTIVE | Noted: 2019-03-03

## 2019-03-03 PROCEDURE — C9113 INJ PANTOPRAZOLE SODIUM, VIA: HCPCS | Performed by: INTERNAL MEDICINE

## 2019-03-03 PROCEDURE — 99215 OFFICE O/P EST HI 40 MIN: CPT | Performed by: PSYCHIATRY & NEUROLOGY

## 2019-03-03 PROCEDURE — 99217 PR OBSERVATION CARE DISCHARGE MANAGEMENT: CPT | Performed by: INTERNAL MEDICINE

## 2019-03-03 RX ORDER — KETOROLAC TROMETHAMINE 30 MG/ML
15 INJECTION, SOLUTION INTRAMUSCULAR; INTRAVENOUS EVERY 6 HOURS PRN
Status: DISCONTINUED | OUTPATIENT
Start: 2019-03-03 | End: 2019-03-03 | Stop reason: HOSPADM

## 2019-03-03 RX ADMIN — NICOTINE 21 MG: 21 PATCH TRANSDERMAL at 08:42

## 2019-03-03 RX ADMIN — LIDOCAINE 2 PATCH: 50 PATCH TOPICAL at 08:35

## 2019-03-03 RX ADMIN — LORAZEPAM 0.5 MG: 2 INJECTION INTRAMUSCULAR; INTRAVENOUS at 09:53

## 2019-03-03 RX ADMIN — KETOROLAC TROMETHAMINE 15 MG: 30 INJECTION, SOLUTION INTRAMUSCULAR at 08:40

## 2019-03-03 RX ADMIN — SODIUM CHLORIDE 8 MG/HR: 9 INJECTION, SOLUTION INTRAVENOUS at 01:43

## 2019-03-03 RX ADMIN — BACLOFEN 10 MG: 10 TABLET ORAL at 08:42

## 2019-03-03 RX ADMIN — SODIUM CHLORIDE 75 ML/HR: 0.9 INJECTION, SOLUTION INTRAVENOUS at 00:34

## 2019-03-03 RX ADMIN — MORPHINE SULFATE 2 MG: 2 INJECTION, SOLUTION INTRAMUSCULAR; INTRAVENOUS at 10:08
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
## 2019-03-03 NOTE — NURSING NOTE
Patient experienced two back to back seizures  Patient turned to side, oxygen applied, suctioning at bedside, and   5mg Ativan given IV  Patient wanted a shower and after educating and explaining the risks  The patient nodded in understanding and continued into the shower against medical advice  Will continue to educate and monitor    Opal Dong RN

## 2019-03-03 NOTE — NURSING NOTE
Patient just finished having two seizures in bed in a row  Partner at bedside  Patient got up and insisted she take a shower despite the fact that she does not have shower orders and is at risk for having a seizure in the shower  Partner and nurse, Vikki Ervin, and myself, along with charge nurse attempted to instruct her not to shower and to wash up at sink as an alternative  Explained the risks of taking a shower post seizure-activity and patient still refused and stepped into the shower   is currently in the shower with the patient and is instructed to call in the event of any more seizure-like activity  Dr Mallorie Luz also made aware
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
## 2019-03-03 NOTE — PLAN OF CARE
Problem: PAIN - ADULT  Goal: Verbalizes/displays adequate comfort level or baseline comfort level  Description  Interventions:  - Encourage patient to monitor pain and request assistance  - Assess pain using appropriate pain scale  - Administer analgesics based on type and severity of pain and evaluate response  - Implement non-pharmacological measures as appropriate and evaluate response  - Consider cultural and social influences on pain and pain management  - Notify physician/advanced practitioner if interventions unsuccessful or patient reports new pain  Outcome: Progressing     Problem: INFECTION - ADULT  Goal: Absence or prevention of progression during hospitalization  Description  INTERVENTIONS:  - Assess and monitor for signs and symptoms of infection  - Monitor lab/diagnostic results  - Monitor all insertion sites, i e  indwelling lines, tubes, and drains  - Monitor endotracheal (as able) and nasal secretions for changes in amount and color  - Wy[place]off appropriate cooling/warming therapies per order  - Administer medications as ordered  - Instruct and encourage patient and family to use good hand hygiene technique  - Identify and instruct in appropriate isolation precautions for identified infection/condition  Outcome: Progressing     Problem: SAFETY ADULT  Goal: Patient will remain free of falls  Description  INTERVENTIONS:  - Assess patient frequently for physical needs  -  Identify cognitive and physical deficits and behaviors that affect risk of falls    -  Wykoff fall precautions as indicated by assessment   - Educate patient/family on patient safety including physical limitations  - Instruct patient to call for assistance with activity based on assessment  - Modify environment to reduce risk of injury  - Consider OT/PT consult to assist with strengthening/mobility  Outcome: Progressing     Problem: DISCHARGE PLANNING  Goal: Discharge to home or other facility with appropriate resources  Description  INTERVENTIONS:  - Identify barriers to discharge w/patient and caregiver  - Arrange for needed discharge resources and transportation as appropriate  - Identify discharge learning needs (meds, wound care, etc )  - Arrange for interpretive services to assist at discharge as needed  - Refer to Case Management Department for coordinating discharge planning if the patient needs post-hospital services based on physician/advanced practitioner order or complex needs related to functional status, cognitive ability, or social support system  Outcome: Progressing     Problem: Knowledge Deficit  Goal: Patient/family/caregiver demonstrates understanding of disease process, treatment plan, medications, and discharge instructions  Description  Complete learning assessment and assess knowledge base  Interventions:  - Provide teaching at level of understanding  - Provide teaching via preferred learning methods  Outcome: Progressing     Problem: CARDIOVASCULAR - ADULT  Goal: Maintains optimal cardiac output and hemodynamic stability  Description  INTERVENTIONS:  - Monitor I/O, vital signs and rhythm  - Monitor for S/S and trends of decreased cardiac output i e  bleeding, hypotension  - Administer and titrate ordered vasoactive medications to optimize hemodynamic stability  - Assess quality of pulses, skin color and temperature  - Assess for signs of decreased coronary artery perfusion - ex   Angina  - Instruct patient to report change in severity of symptoms  Outcome: Progressing     Problem: GASTROINTESTINAL - ADULT  Goal: Minimal or absence of nausea and/or vomiting  Description  INTERVENTIONS:  - Administer IV fluids as ordered to ensure adequate hydration  - Maintain NPO status until nausea and vomiting are resolved  - Nasogastric tube as ordered  - Administer ordered antiemetic medications as needed  - Provide nonpharmacologic comfort measures as appropriate  - Advance diet as tolerated, if ordered  - Nutrition services referral to assist patient with adequate nutrition and appropriate food choices  Outcome: Progressing  Goal: Maintains adequate nutritional intake  Description  INTERVENTIONS:  - Monitor percentage of each meal consumed  - Identify factors contributing to decreased intake, treat as appropriate  - Assist with meals as needed  - Monitor I&O, WT and lab values  - Obtain nutrition services referral as needed  Outcome: Progressing     Problem: METABOLIC, FLUID AND ELECTROLYTES - ADULT  Goal: Fluid balance maintained  Description  INTERVENTIONS:  - Monitor labs and assess for signs and symptoms of volume excess or deficit  - Monitor I/O and WT  - Instruct patient on fluid and nutrition as appropriate  Outcome: Progressing     Problem: Nutrition/Hydration-ADULT  Goal: Nutrient/Hydration intake appropriate for improving, restoring or maintaining nutritional needs  Description  Monitor and assess patient's nutrition/hydration status for malnutrition (ex- brittle hair, bruises, dry skin, pale skin and conjunctiva, muscle wasting, smooth red tongue, and disorientation)  Collaborate with interdisciplinary team and initiate plan and interventions as ordered  Monitor patient's weight and dietary intake as ordered or per policy  Utilize nutrition screening tool and intervene per policy  Determine patient's food preferences and provide high-protein, high-caloric foods as appropriate       INTERVENTIONS:  - Monitor oral intake, urinary output, labs, and treatment plans  - Assess nutrition and hydration status and recommend course of action  - Evaluate amount of meals eaten  - Assist patient with eating if necessary   - Allow adequate time for meals  - Recommend/ encourage appropriate diets, oral nutritional supplements, and vitamin/mineral supplements  - Order, calculate, and assess calorie counts as needed  - Recommend, monitor, and adjust tube feedings and TPN/PPN based on assessed needs  - Assess need for intravenous fluids  - Provide specific nutrition/hydration education as appropriate  - Include patient/family/caregiver in decisions related to nutrition  Outcome: Progressing
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
## 2019-03-03 NOTE — PROGRESS NOTES
Primary nurse called me, Charge nurse to discuss the risk of the patient going into the shower  Patient refuses to listen to recommendation after risks were reviewed multiple times  Suggested shower chair, or bathing at the sink but she is still refusing  She keeps stating, "I am not going to fall" in [address] [facility]  Patient's significant other ensures he will stand by her and monitor her for any falls  Supervisor also notified of noncompliance  Will monitor

## 2019-03-03 NOTE — PROGRESS NOTES
Progress Note - Neurology   Jamila Cruz 26 y o  female MRN: [id number]  Unit/Bed#: E5 -01 Encounter: [id number]    Assessment:  Seizure-like spells, likely non-epileptic  Purposeful resistance, communication during the spell, forced eye closure, anxiety with SOB/chest pain surrounding the spells, and her psychiatric hx as well as strange affect with indifference after her spells do raise suspicion for non-epileptic events - witnessed event was non-epileptic in semiology  It is possible to have both epileptic and non-epileptic events however in this case it appears unlikely      Plan:  -continue neurochecks  -HCT reviewed - no evidence of acute pathology or structural abnormality  -can give IV Ativan for recurrent spells that last for 3-5+ minutes or more than one event without return to baseline  -discontinued Keppra as this would lower yield of diagnostics and likely would not be a good choice in the setting of her psychiatric hx  -if AED is desired/indicated, would prefer Depakote - if she does choose to leave AMA, would prefer to treat in the unlikely event that she did have epileptic spells  -if she remains here, will plan to obtain routine EEG vs  transfer for continuous monitoring to [facility]  -UDS positive for opiates, had been given morphine  -agree with Psychiatry consultation regarding her hx, medication compliance, and need for pharmacological treatment at this time  -if she does leave AMA, would provide prescription for Depakote 500mg BID given the likelihood of recurrent spells and the possibility that one or more of her prior events may have been epileptic  -she has been instructed that she cannot drive or operate heavy machinery and should avoid swimming/cooking without supervision      Subjective:   No further events yesterday into overnight, however this AM around 10:10-15 she had two more seizure-like events within 10 minutes of each other and each one lasting about 1-2 minutes  Again had shaking in all extremities and appeared stiff with eyes closed  There was no post-ictal period after the events and she was at her baseline in between the spells  After her last spell, she stated she wanted to shower and went against medical advice to do so  UDS returned only positive for opiates (has been receiving morphine for pain)  Pt admits to again feeling anxious and SOB prior to the episodes  Per discussion with more staff present during spells yesterday, she was apparently clearly speaking at least during one of the events and stating that she cannot breathe  Discussed option of transfer to [facility] in order to obtain continuous video monitoring and she became agitated, yelling at both myself and the  on the phone, hanging up  According to the , she is refusing transfer as she believes that she does not have seizures but that she is having these spells because she is upset  When explained that the best way to understand these spells are to capture them, she continued to refuse despite her mother and  attempting to convince her otherwise  She states that she feels more comfortable here and does not wish to be transferred  Soon after my interview, she had a fifth event, which I had witnessed  She had stiffened up and during the spell itself was complaining of SOB  Her eyes were forced closed and she had no gaze deviation or rhythmic movements  This resolved in about 30 seconds and she began to complain of severe chest pain immediately - there was no post-ictal state and she was fully oriented immediately after the end of her spell  Discussed with Dr Herberth Carpio  She offered to agree to the transfer if she were allowed to smoke a cigarette, and when I refused to allow that she refused again to transfer  She later attempted to leave by elevator and control team was called  She is requesting to leave AMA      ROS:  Positive for SOB, nausea, epigastric pain/discomfort, back/shoulder pain, and seizure activity; otherwise negative per 12-point review  Vitals: Blood pressure 116/77, pulse 102, temperature 98 °F (36 7 °C), temperature source Temporal, resp  rate 14, height 5' 4" (1 626 m), weight 70 9 kg (156 lb 6 4 oz), last menstrual period 02/22/2019, SpO2 100 %  ,Body mass index is 26 85 kg/m²  Physical Exam: General appearance: alert and oriented, in no acute distress  Head: Normocephalic, without obvious abnormality, atraumatic  Eyes: conjunctivae/corneas clear  PERRL, EOM's intact  Fundi benign  Neck: no adenopathy, no carotid bruit, no JVD and supple, symmetrical, trachea midline  Lungs: clear to auscultation bilaterally  Heart: regular rate and rhythm, S1, S2 normal, no murmur, click, rub or gallop  Abdomen: soft, non-tender; bowel sounds normal; no masses,  no organomegaly  Extremities: extremities normal, warm and well-perfused; no cyanosis, clubbing, or edema     Neurologic exam:  Awake and alert with appropriate mental status and language function  No visual, EOM, or facial deficit  Motor testing reveals 5/5 strength in the bilateral upper and lower extremities with no drift  Sensation is intact to light touch in the bilateral upper and lower extremities      Lab, Imaging and other studies:   CBC:   Results from last 7 days   Lab Units 03/01/19 0633 03/01/19 0029 02/28/19 1957   WBC Thousand/uL  --   --  7 73   RBC Million/uL  --   --  4 49   HEMOGLOBIN g/dL 12 0 11 9 13 2   HEMATOCRIT % 37 3 37 1 41 1   MCV fL  --   --  92   PLATELETS Thousands/uL  --   --  361   , BMP/CMP:   Results from last 7 days   Lab Units 03/01/19 0633 02/28/19 1957   SODIUM mmol/L 138 142   POTASSIUM mmol/L 4 3 3 3*   CHLORIDE mmol/L 106 104   CO2 mmol/L 23 28   BUN mg/dL 9 9   CREATININE mg/dL 0 69 0 82   CALCIUM mg/dL 8 5 8 6   AST U/L  --  15   ALT U/L  --  21   ALK PHOS U/L  --  107   EGFR ml/min/1 73sq m 113 93     VTE Prophylaxis: Sequential compression device (Venodyne)     Total time spent 75 minutes including extensive discussion with family, patient, and her team as well as managing her acute event

## 2019-03-03 NOTE — NURSING NOTE
Patient found at elevators wanting to go to the cafe to get her food she had ordered   Patient refusing to get off the elevator and when we
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
## 2019-03-03 NOTE — PLAN OF CARE
Problem: PAIN - ADULT  Goal: Verbalizes/displays adequate comfort level or baseline comfort level  Description  Interventions:  - Encourage patient to monitor pain and request assistance  - Assess pain using appropriate pain scale  - Administer analgesics based on type and severity of pain and evaluate response  - Implement non-pharmacological measures as appropriate and evaluate response  - Consider cultural and social influences on pain and pain management  - Notify physician/advanced practitioner if interventions unsuccessful or patient reports new pain  Outcome: Progressing     Problem: INFECTION - ADULT  Goal: Absence or prevention of progression during hospitalization  Description  INTERVENTIONS:  - Assess and monitor for signs and symptoms of infection  - Monitor lab/diagnostic results  - Monitor all insertion sites, i e  indwelling lines, tubes, and drains  - Monitor endotracheal (as able) and nasal secretions for changes in amount and color  - [place] appropriate cooling/warming therapies per order  - Administer medications as ordered  - Instruct and encourage patient and family to use good hand hygiene technique  - Identify and instruct in appropriate isolation precautions for identified infection/condition  Outcome: Progressing     Problem: SAFETY ADULT  Goal: Patient will remain free of falls  Description  INTERVENTIONS:  - Assess patient frequently for physical needs  -  Identify cognitive and physical deficits and behaviors that affect risk of falls    -  [place] fall precautions as indicated by assessment   - Educate patient/family on patient safety including physical limitations  - Instruct patient to call for assistance with activity based on assessment  - Modify environment to reduce risk of injury  - Consider OT/PT consult to assist with strengthening/mobility  Outcome: Progressing     Problem: DISCHARGE PLANNING  Goal: Discharge to home or other facility with appropriate resources  Description  INTERVENTIONS:  - Identify barriers to discharge w/patient and caregiver  - Arrange for needed discharge resources and transportation as appropriate  - Identify discharge learning needs (meds, wound care, etc )  - Arrange for interpretive services to assist at discharge as needed  - Refer to Case Management Department for coordinating discharge planning if the patient needs post-hospital services based on physician/advanced practitioner order or complex needs related to functional status, cognitive ability, or social support system  Outcome: Progressing     Problem: Knowledge Deficit  Goal: Patient/family/caregiver demonstrates understanding of disease process, treatment plan, medications, and discharge instructions  Description  Complete learning assessment and assess knowledge base  Interventions:  - Provide teaching at level of understanding  - Provide teaching via preferred learning methods  Outcome: Progressing     Problem: CARDIOVASCULAR - ADULT  Goal: Maintains optimal cardiac output and hemodynamic stability  Description  INTERVENTIONS:  - Monitor I/O, vital signs and rhythm  - Monitor for S/S and trends of decreased cardiac output i e  bleeding, hypotension  - Administer and titrate ordered vasoactive medications to optimize hemodynamic stability  - Assess quality of pulses, skin color and temperature  - Assess for signs of decreased coronary artery perfusion - ex   Angina  - Instruct patient to report change in severity of symptoms  Outcome: Progressing     Problem: GASTROINTESTINAL - ADULT  Goal: Minimal or absence of nausea and/or vomiting  Description  INTERVENTIONS:  - Administer IV fluids as ordered to ensure adequate hydration  - Maintain NPO status until nausea and vomiting are resolved  - Nasogastric tube as ordered  - Administer ordered antiemetic medications as needed  - Provide nonpharmacologic comfort measures as appropriate  - Advance diet as tolerated, if ordered  - Nutrition services referral to assist patient with adequate nutrition and appropriate food choices  Outcome: Progressing  Goal: Maintains adequate nutritional intake  Description  INTERVENTIONS:  - Monitor percentage of each meal consumed  - Identify factors contributing to decreased intake, treat as appropriate  - Assist with meals as needed  - Monitor I&O, WT and lab values  - Obtain nutrition services referral as needed  Outcome: Progressing     Problem: METABOLIC, FLUID AND ELECTROLYTES - ADULT  Goal: Fluid balance maintained  Description  INTERVENTIONS:  - Monitor labs and assess for signs and symptoms of volume excess or deficit  - Monitor I/O and WT  - Instruct patient on fluid and nutrition as appropriate  Outcome: Progressing     Problem: Nutrition/Hydration-ADULT  Goal: Nutrient/Hydration intake appropriate for improving, restoring or maintaining nutritional needs  Description  Monitor and assess patient's nutrition/hydration status for malnutrition (ex- brittle hair, bruises, dry skin, pale skin and conjunctiva, muscle wasting, smooth red tongue, and disorientation)  Collaborate with interdisciplinary team and initiate plan and interventions as ordered  Monitor patient's weight and dietary intake as ordered or per policy  Utilize nutrition screening tool and intervene per policy  Determine patient's food preferences and provide high-protein, high-caloric foods as appropriate       INTERVENTIONS:  - Monitor oral intake, urinary output, labs, and treatment plans  - Assess nutrition and hydration status and recommend course of action  - Evaluate amount of meals eaten  - Assist patient with eating if necessary   - Allow adequate time for meals  - Recommend/ encourage appropriate diets, oral nutritional supplements, and vitamin/mineral supplements  - Order, calculate, and assess calorie counts as needed  - Recommend, monitor, and adjust tube feedings and TPN/PPN based on assessed needs  - Assess need for intravenous fluids  - Provide specific nutrition/hydration education as appropriate  - Include patient/family/caregiver in decisions related to nutrition  Outcome: Progressing

## 2019-03-04 LAB
ATRIAL RATE: 97 BPM
P AXIS: 50 DEGREES
PR INTERVAL: 148 MS
QRS AXIS: 9 DEGREES
QRSD INTERVAL: 78 MS
QT INTERVAL: 368 MS
QTC INTERVAL: 467 MS
T WAVE AXIS: 13 DEGREES
VENTRICULAR RATE: 97 BPM

## 2019-03-04 PROCEDURE — 93010 ELECTROCARDIOGRAM REPORT: CPT | Performed by: INTERNAL MEDICINE

## 2019-03-08 LAB
AMPHETAMINES UR QL SCN: NEGATIVE NG/ML
BARBITURATES UR QL SCN: NEGATIVE NG/ML
BENZODIAZ UR QL: NEGATIVE NG/ML
BZE UR QL: NEGATIVE NG/ML
CANNABINOIDS UR QL SCN: NEGATIVE NG/ML
METHADONE UR QL SCN: NEGATIVE NG/ML
OPIATES UR QL: NEGATIVE
PCP UR QL: NEGATIVE NG/ML
PROPOXYPH UR QL SCN: NEGATIVE NG/ML

## 2019-06-05 ENCOUNTER — HOSPITAL ENCOUNTER (EMERGENCY)
Facility: HOSPITAL | Age: 34
Discharge: HOME | End: 2019-06-05
Attending: EMERGENCY MEDICINE | Admitting: EMERGENCY MEDICINE
Payer: COMMERCIAL

## 2019-06-05 VITALS
HEART RATE: 97 BPM | TEMPERATURE: 97.8 F | DIASTOLIC BLOOD PRESSURE: 84 MMHG | SYSTOLIC BLOOD PRESSURE: 132 MMHG | RESPIRATION RATE: 20 BRPM | OXYGEN SATURATION: 97 %

## 2019-06-05 DIAGNOSIS — E86.0 DEHYDRATION: ICD-10-CM

## 2019-06-05 DIAGNOSIS — R51.9 HEADACHE: Primary | ICD-10-CM

## 2019-06-05 DIAGNOSIS — R11.2 NAUSEA & VOMITING: ICD-10-CM

## 2019-06-05 DIAGNOSIS — K92.0 HEMATEMESIS: ICD-10-CM

## 2019-06-05 DIAGNOSIS — S29.012A MUSCLE STRAIN OF UPPER BACK: ICD-10-CM

## 2019-06-05 DIAGNOSIS — R42 DIZZINESS: ICD-10-CM

## 2019-06-05 LAB
ALBUMIN SERPL BCP-MCNC: 3.7 G/DL (ref 3.5–5)
ALP SERPL-CCNC: 107 U/L (ref 46–116)
ALT SERPL W P-5'-P-CCNC: 21 U/L (ref 12–78)
ANION GAP SERPL CALCULATED.3IONS-SCNC: 10 MMOL/L (ref 4–13)
AST SERPL W P-5'-P-CCNC: 13 U/L (ref 5–45)
BASOPHILS # BLD AUTO: 0.14 THOUSANDS/ΜL (ref 0–0.1)
BASOPHILS NFR BLD AUTO: 1 % (ref 0–1)
BILIRUB SERPL-MCNC: 0.18 MG/DL (ref 0.2–1)
BILIRUB UR QL STRIP: NEGATIVE
BUN SERPL-MCNC: 8 MG/DL (ref 5–25)
CALCIUM SERPL-MCNC: 9.1 MG/DL (ref 8.3–10.1)
CHLORIDE SERPL-SCNC: 104 MMOL/L (ref 100–108)
CLARITY UR: CLEAR
CLARITY, POC: CLEAR
CO2 SERPL-SCNC: 26 MMOL/L (ref 21–32)
COLOR UR: YELLOW
COLOR, POC: YELLOW
CREAT SERPL-MCNC: 0.65 MG/DL (ref 0.6–1.3)
EOSINOPHIL # BLD AUTO: 0.32 THOUSAND/ΜL (ref 0–0.61)
EOSINOPHIL NFR BLD AUTO: 3 % (ref 0–6)
ERYTHROCYTE [DISTWIDTH] IN BLOOD BY AUTOMATED COUNT: 13.1 % (ref 11.6–15.1)
EXT PREG TEST URINE: NEGATIVE
GFR SERPL CREATININE-BSD FRML MDRD: 115 ML/MIN/1.73SQ M
GLUCOSE SERPL-MCNC: 95 MG/DL (ref 65–140)
GLUCOSE UR STRIP-MCNC: NEGATIVE MG/DL
HCT VFR BLD AUTO: 39.6 % (ref 34.8–46.1)
HGB BLD-MCNC: 13.1 G/DL (ref 11.5–15.4)
HGB UR QL STRIP.AUTO: NEGATIVE
IMM GRANULOCYTES # BLD AUTO: 0.03 THOUSAND/UL (ref 0–0.2)
IMM GRANULOCYTES NFR BLD AUTO: 0 % (ref 0–2)
KETONES UR STRIP-MCNC: NEGATIVE MG/DL
LEUKOCYTE ESTERASE UR QL STRIP: NEGATIVE
LIPASE SERPL-CCNC: 128 U/L (ref 73–393)
LYMPHOCYTES # BLD AUTO: 3.55 THOUSANDS/ΜL (ref 0.6–4.47)
LYMPHOCYTES NFR BLD AUTO: 34 % (ref 14–44)
MCH RBC QN AUTO: 30.3 PG (ref 26.8–34.3)
MCHC RBC AUTO-ENTMCNC: 33.1 G/DL (ref 31.4–37.4)
MCV RBC AUTO: 92 FL (ref 82–98)
MONOCYTES # BLD AUTO: 0.94 THOUSAND/ΜL (ref 0.17–1.22)
MONOCYTES NFR BLD AUTO: 9 % (ref 4–12)
NEUTROPHILS # BLD AUTO: 5.51 THOUSANDS/ΜL (ref 1.85–7.62)
NEUTS SEG NFR BLD AUTO: 53 % (ref 43–75)
NITRITE UR QL STRIP: NEGATIVE
NRBC BLD AUTO-RTO: 0 /100 WBCS
PH UR STRIP.AUTO: 6 [PH] (ref 4.5–8)
PLATELET # BLD AUTO: 313 THOUSANDS/UL (ref 149–390)
PMV BLD AUTO: 9.8 FL (ref 8.9–12.7)
POTASSIUM SERPL-SCNC: 3.6 MMOL/L (ref 3.5–5.3)
PROT SERPL-MCNC: 7.4 G/DL (ref 6.4–8.2)
PROT UR STRIP-MCNC: NEGATIVE MG/DL
RBC # BLD AUTO: 4.33 MILLION/UL (ref 3.81–5.12)
SODIUM SERPL-SCNC: 140 MMOL/L (ref 136–145)
SP GR UR STRIP.AUTO: >=1.03 (ref 1–1.03)
UROBILINOGEN UR QL STRIP.AUTO: 1 E.U./DL
WBC # BLD AUTO: 10.49 THOUSAND/UL (ref 4.31–10.16)

## 2019-06-05 PROCEDURE — 99283 EMERGENCY DEPT VISIT LOW MDM: CPT

## 2019-06-05 PROCEDURE — 99283 EMERGENCY DEPT VISIT LOW MDM: CPT | Performed by: EMERGENCY MEDICINE

## 2019-06-05 PROCEDURE — 81025 URINE PREGNANCY TEST: CPT | Performed by: EMERGENCY MEDICINE

## 2019-06-05 PROCEDURE — 96374 THER/PROPH/DIAG INJ IV PUSH: CPT

## 2019-06-05 PROCEDURE — 36415 COLL VENOUS BLD VENIPUNCTURE: CPT | Performed by: EMERGENCY MEDICINE

## 2019-06-05 PROCEDURE — 81003 URINALYSIS AUTO W/O SCOPE: CPT

## 2019-06-05 PROCEDURE — 85025 COMPLETE CBC W/AUTO DIFF WBC: CPT | Performed by: EMERGENCY MEDICINE

## 2019-06-05 PROCEDURE — 96375 TX/PRO/DX INJ NEW DRUG ADDON: CPT

## 2019-06-05 PROCEDURE — 83690 ASSAY OF LIPASE: CPT | Performed by: EMERGENCY MEDICINE

## 2019-06-05 PROCEDURE — 80053 COMPREHEN METABOLIC PANEL: CPT | Performed by: EMERGENCY MEDICINE

## 2019-06-05 PROCEDURE — 96361 HYDRATE IV INFUSION ADD-ON: CPT

## 2019-06-05 RX ORDER — LIDOCAINE 50 MG/G
1 PATCH TOPICAL EVERY 24 HOURS
Qty: 30 PATCH | Refills: 0 | Status: SHIPPED | OUTPATIENT
Start: 2019-06-05 | End: 2019-08-15

## 2019-06-05 RX ORDER — OMEPRAZOLE 20 MG/1
20 CAPSULE, DELAYED RELEASE ORAL DAILY
Qty: 30 CAPSULE | Refills: 0 | Status: SHIPPED | OUTPATIENT
Start: 2019-06-05 | End: 2020-12-23

## 2019-06-05 RX ORDER — ACETAMINOPHEN 325 MG/1
650 TABLET ORAL ONCE
Status: COMPLETED | OUTPATIENT
Start: 2019-06-05 | End: 2019-06-05

## 2019-06-05 RX ORDER — ONDANSETRON 2 MG/ML
4 INJECTION INTRAMUSCULAR; INTRAVENOUS ONCE
Status: COMPLETED | OUTPATIENT
Start: 2019-06-05 | End: 2019-06-05

## 2019-06-05 RX ORDER — LIDOCAINE 50 MG/G
1 PATCH TOPICAL ONCE
Status: DISCONTINUED | OUTPATIENT
Start: 2019-06-05 | End: 2019-06-05 | Stop reason: HOSPADM

## 2019-06-05 RX ORDER — ONDANSETRON 4 MG/1
4 TABLET, ORALLY DISINTEGRATING ORAL EVERY 8 HOURS PRN
Qty: 20 TABLET | Refills: 0 | Status: SHIPPED | OUTPATIENT
Start: 2019-06-05 | End: 2019-08-15

## 2019-06-05 RX ADMIN — SODIUM CHLORIDE 1000 ML: 0.9 INJECTION, SOLUTION INTRAVENOUS at 02:13

## 2019-06-05 RX ADMIN — LIDOCAINE 1 PATCH: 50 PATCH TOPICAL at 02:01

## 2019-06-05 RX ADMIN — ACETAMINOPHEN 650 MG: 325 TABLET ORAL at 02:00

## 2019-06-05 RX ADMIN — ONDANSETRON 4 MG: 2 INJECTION INTRAMUSCULAR; INTRAVENOUS at 02:12

## 2019-06-05 RX ADMIN — FAMOTIDINE 20 MG: 10 INJECTION, SOLUTION INTRAVENOUS at 02:13

## 2019-08-15 ENCOUNTER — HOSPITAL ENCOUNTER (EMERGENCY)
Facility: HOSPITAL | Age: 34
Discharge: HOME | End: 2019-08-15
Attending: EMERGENCY MEDICINE | Admitting: EMERGENCY MEDICINE
Payer: COMMERCIAL

## 2019-08-15 VITALS
OXYGEN SATURATION: 99 % | HEART RATE: 86 BPM | SYSTOLIC BLOOD PRESSURE: 129 MMHG | BODY MASS INDEX: 26.72 KG/M2 | DIASTOLIC BLOOD PRESSURE: 76 MMHG | WEIGHT: 155.65 LBS | RESPIRATION RATE: 18 BRPM | TEMPERATURE: 98.3 F

## 2019-08-15 DIAGNOSIS — R11.0 NAUSEA: ICD-10-CM

## 2019-08-15 DIAGNOSIS — R10.9 ABDOMINAL PAIN: Primary | ICD-10-CM

## 2019-08-15 LAB
ALBUMIN SERPL BCP-MCNC: 3.8 G/DL (ref 3.5–5)
ALP SERPL-CCNC: 97 U/L (ref 46–116)
ALT SERPL W P-5'-P-CCNC: 24 U/L (ref 12–78)
ANION GAP SERPL CALCULATED.3IONS-SCNC: 8 MMOL/L (ref 4–13)
AST SERPL W P-5'-P-CCNC: 17 U/L (ref 5–45)
BACTERIA UR QL AUTO: ABNORMAL /HPF
BASOPHILS # BLD AUTO: 0.06 THOUSANDS/ΜL (ref 0–0.1)
BASOPHILS NFR BLD AUTO: 1 % (ref 0–1)
BILIRUB SERPL-MCNC: 0.31 MG/DL (ref 0.2–1)
BILIRUB UR QL STRIP: NEGATIVE
BUN SERPL-MCNC: 6 MG/DL (ref 5–25)
CALCIUM SERPL-MCNC: 8.8 MG/DL (ref 8.3–10.1)
CHLORIDE SERPL-SCNC: 105 MMOL/L (ref 100–108)
CLARITY UR: CLEAR
CO2 SERPL-SCNC: 27 MMOL/L (ref 21–32)
COLOR UR: YELLOW
CREAT SERPL-MCNC: 0.71 MG/DL (ref 0.6–1.3)
EOSINOPHIL # BLD AUTO: 0.22 THOUSAND/ΜL (ref 0–0.61)
EOSINOPHIL NFR BLD AUTO: 2 % (ref 0–6)
ERYTHROCYTE [DISTWIDTH] IN BLOOD BY AUTOMATED COUNT: 13.3 % (ref 11.6–15.1)
EXT PREG TEST URINE: NORMAL
EXT. CONTROL ED NAV: NORMAL
GFR SERPL CREATININE-BSD FRML MDRD: 110 ML/MIN/1.73SQ M
GLUCOSE SERPL-MCNC: 86 MG/DL (ref 65–140)
GLUCOSE UR STRIP-MCNC: NEGATIVE MG/DL
HCT VFR BLD AUTO: 43.6 % (ref 34.8–46.1)
HGB BLD-MCNC: 14 G/DL (ref 11.5–15.4)
HGB UR QL STRIP.AUTO: ABNORMAL
IMM GRANULOCYTES # BLD AUTO: 0.03 THOUSAND/UL (ref 0–0.2)
IMM GRANULOCYTES NFR BLD AUTO: 0 % (ref 0–2)
KETONES UR STRIP-MCNC: NEGATIVE MG/DL
LEUKOCYTE ESTERASE UR QL STRIP: NEGATIVE
LIPASE SERPL-CCNC: 155 U/L (ref 73–393)
LYMPHOCYTES # BLD AUTO: 1.72 THOUSANDS/ΜL (ref 0.6–4.47)
LYMPHOCYTES NFR BLD AUTO: 18 % (ref 14–44)
MCH RBC QN AUTO: 30 PG (ref 26.8–34.3)
MCHC RBC AUTO-ENTMCNC: 32.1 G/DL (ref 31.4–37.4)
MCV RBC AUTO: 93 FL (ref 82–98)
MONOCYTES # BLD AUTO: 0.79 THOUSAND/ΜL (ref 0.17–1.22)
MONOCYTES NFR BLD AUTO: 8 % (ref 4–12)
NEUTROPHILS # BLD AUTO: 6.78 THOUSANDS/ΜL (ref 1.85–7.62)
NEUTS SEG NFR BLD AUTO: 71 % (ref 43–75)
NITRITE UR QL STRIP: NEGATIVE
NON-SQ EPI CELLS URNS QL MICRO: ABNORMAL /HPF
NRBC BLD AUTO-RTO: 0 /100 WBCS
PH UR STRIP.AUTO: 6 [PH] (ref 4.5–8)
PLATELET # BLD AUTO: 348 THOUSANDS/UL (ref 149–390)
PMV BLD AUTO: 9.7 FL (ref 8.9–12.7)
POTASSIUM SERPL-SCNC: 4 MMOL/L (ref 3.5–5.3)
PROT SERPL-MCNC: 7.9 G/DL (ref 6.4–8.2)
PROT UR STRIP-MCNC: NEGATIVE MG/DL
RBC # BLD AUTO: 4.67 MILLION/UL (ref 3.81–5.12)
RBC #/AREA URNS AUTO: ABNORMAL /HPF
SODIUM SERPL-SCNC: 140 MMOL/L (ref 136–145)
SP GR UR STRIP.AUTO: <=1.005 (ref 1–1.03)
UROBILINOGEN UR QL STRIP.AUTO: 0.2 E.U./DL
WBC # BLD AUTO: 9.6 THOUSAND/UL (ref 4.31–10.16)
WBC #/AREA URNS AUTO: ABNORMAL /HPF

## 2019-08-15 PROCEDURE — 85025 COMPLETE CBC W/AUTO DIFF WBC: CPT | Performed by: EMERGENCY MEDICINE

## 2019-08-15 PROCEDURE — 36415 COLL VENOUS BLD VENIPUNCTURE: CPT | Performed by: EMERGENCY MEDICINE

## 2019-08-15 PROCEDURE — 99284 EMERGENCY DEPT VISIT MOD MDM: CPT

## 2019-08-15 PROCEDURE — 99283 EMERGENCY DEPT VISIT LOW MDM: CPT | Performed by: EMERGENCY MEDICINE

## 2019-08-15 PROCEDURE — 81001 URINALYSIS AUTO W/SCOPE: CPT

## 2019-08-15 PROCEDURE — 83690 ASSAY OF LIPASE: CPT | Performed by: EMERGENCY MEDICINE

## 2019-08-15 PROCEDURE — 81025 URINE PREGNANCY TEST: CPT | Performed by: EMERGENCY MEDICINE

## 2019-08-15 PROCEDURE — 80053 COMPREHEN METABOLIC PANEL: CPT | Performed by: EMERGENCY MEDICINE

## 2019-08-15 RX ORDER — FAMOTIDINE 20 MG/1
20 TABLET, FILM COATED ORAL 2 TIMES DAILY
Qty: 30 TABLET | Refills: 0 | Status: SHIPPED | OUTPATIENT
Start: 2019-08-15 | End: 2020-12-23

## 2019-08-15 RX ORDER — ONDANSETRON 4 MG/1
4 TABLET, ORALLY DISINTEGRATING ORAL EVERY 6 HOURS PRN
Qty: 20 TABLET | Refills: 0 | Status: SHIPPED | OUTPATIENT
Start: 2019-08-15 | End: 2020-12-23

## 2019-08-15 RX ORDER — ALUMINUM HYDROXIDE, MAGNESIUM HYDROXIDE, SIMETHICONE 400; 400; 40 MG/10ML; MG/10ML; MG/10ML
20 SUSPENSION ORAL
Qty: 710 ML | Refills: 0 | Status: SHIPPED | OUTPATIENT
Start: 2019-08-15 | End: 2019-09-14

## 2019-08-15 RX ORDER — SUCRALFATE ORAL 1 G/10ML
1000 SUSPENSION ORAL ONCE
Status: COMPLETED | OUTPATIENT
Start: 2019-08-15 | End: 2019-08-15

## 2019-08-15 RX ORDER — FAMOTIDINE 20 MG/1
20 TABLET, FILM COATED ORAL ONCE
Status: COMPLETED | OUTPATIENT
Start: 2019-08-15 | End: 2019-08-15

## 2019-08-15 RX ADMIN — SUCRALFATE 1000 MG: 1 SUSPENSION ORAL at 19:15

## 2019-08-15 RX ADMIN — FAMOTIDINE 20 MG: 20 TABLET ORAL at 19:15

## 2020-12-23 ENCOUNTER — HOSPITAL ENCOUNTER (EMERGENCY)
Facility: HOSPITAL | Age: 35
Discharge: HOME | End: 2020-12-24
Attending: EMERGENCY MEDICINE | Admitting: EMERGENCY MEDICINE
Payer: COMMERCIAL

## 2020-12-23 VITALS
OXYGEN SATURATION: 99 % | TEMPERATURE: 98 F | RESPIRATION RATE: 20 BRPM | SYSTOLIC BLOOD PRESSURE: 148 MMHG | DIASTOLIC BLOOD PRESSURE: 79 MMHG | HEART RATE: 110 BPM

## 2020-12-23 DIAGNOSIS — Z20.822 SUSPECTED 2019 NOVEL CORONAVIRUS INFECTION: Primary | ICD-10-CM

## 2020-12-23 PROCEDURE — 99284 EMERGENCY DEPT VISIT MOD MDM: CPT | Performed by: EMERGENCY MEDICINE

## 2020-12-23 PROCEDURE — 99283 EMERGENCY DEPT VISIT LOW MDM: CPT

## 2020-12-24 PROCEDURE — 87637 SARSCOV2&INF A&B&RSV AMP PRB: CPT | Performed by: EMERGENCY MEDICINE

## 2020-12-24 RX ORDER — MELATONIN
2000 DAILY
Qty: 28 TABLET | Refills: 0 | Status: SHIPPED | OUTPATIENT
Start: 2020-12-24 | End: 2021-01-07

## 2020-12-24 RX ORDER — MULTIVIT WITH MINERALS/LUTEIN
1000 TABLET ORAL 2 TIMES DAILY
Qty: 28 TABLET | Refills: 0 | Status: SHIPPED | OUTPATIENT
Start: 2020-12-24 | End: 2021-01-07

## 2020-12-24 RX ORDER — ALBUTEROL SULFATE 90 UG/1
2 AEROSOL, METERED RESPIRATORY (INHALATION) EVERY 4 HOURS PRN
Qty: 1 INHALER | Refills: 0 | Status: SHIPPED | OUTPATIENT
Start: 2020-12-24

## 2020-12-24 RX ORDER — DEXAMETHASONE 6 MG/1
6 TABLET ORAL
Qty: 10 TABLET | Refills: 0 | Status: SHIPPED | OUTPATIENT
Start: 2020-12-24 | End: 2021-01-03

## 2020-12-24 RX ORDER — DEXTROMETHORPHAN HYDROBROMIDE AND PROMETHAZINE HYDROCHLORIDE 15; 6.25 MG/5ML; MG/5ML
5 SOLUTION ORAL 4 TIMES DAILY PRN
Qty: 118 ML | Refills: 0 | Status: SHIPPED | OUTPATIENT
Start: 2020-12-24

## 2020-12-24 RX ORDER — MULTIVITAMIN
1 TABLET ORAL DAILY
Qty: 14 TABLET | Refills: 0 | Status: SHIPPED | OUTPATIENT
Start: 2020-12-24

## 2020-12-25 LAB
FLUAV RNA NPH QL NAA+PROBE: NOT DETECTED
FLUBV RNA NPH QL NAA+PROBE: NOT DETECTED
RSV RNA NPH QL NAA+PROBE: NOT DETECTED
SARS-COV-2 RNA NPH QL NAA+PROBE: NOT DETECTED

## 2021-12-26 ENCOUNTER — APPOINTMENT (OUTPATIENT)
Dept: URGENT CARE | Facility: CLINIC | Age: 36
End: 2021-12-26

## 2021-12-26 DIAGNOSIS — Z11.59 SPECIAL SCREENING EXAMINATION FOR VIRAL DISEASE: Primary | ICD-10-CM

## 2021-12-26 PROCEDURE — U0005 INFEC AGEN DETEC AMPLI PROBE: HCPCS

## 2021-12-26 PROCEDURE — U0003 INFECTIOUS AGENT DETECTION BY NUCLEIC ACID (DNA OR RNA); SEVERE ACUTE RESPIRATORY SYNDROME CORONAVIRUS 2 (SARS-COV-2) (CORONAVIRUS DISEASE [COVID-19]), AMPLIFIED PROBE TECHNIQUE, MAKING USE OF HIGH THROUGHPUT TECHNOLOGIES AS DESCRIBED BY CMS-2020-01-R: HCPCS

## 2021-12-27 LAB — SARS-COV-2 RNA RESP QL NAA+PROBE: NEGATIVE

## 2022-01-02 ENCOUNTER — APPOINTMENT (OUTPATIENT)
Dept: URGENT CARE | Facility: CLINIC | Age: 37
End: 2022-01-02

## 2022-01-02 DIAGNOSIS — Z11.52 ENCOUNTER FOR SCREENING FOR COVID-19: Primary | ICD-10-CM

## 2022-01-02 PROCEDURE — U0003 INFECTIOUS AGENT DETECTION BY NUCLEIC ACID (DNA OR RNA); SEVERE ACUTE RESPIRATORY SYNDROME CORONAVIRUS 2 (SARS-COV-2) (CORONAVIRUS DISEASE [COVID-19]), AMPLIFIED PROBE TECHNIQUE, MAKING USE OF HIGH THROUGHPUT TECHNOLOGIES AS DESCRIBED BY CMS-2020-01-R: HCPCS

## 2022-01-02 PROCEDURE — U0005 INFEC AGEN DETEC AMPLI PROBE: HCPCS

## 2022-01-03 LAB — SARS-COV-2 RNA RESP QL NAA+PROBE: POSITIVE

## 2022-09-02 ENCOUNTER — APPOINTMENT (EMERGENCY)
Dept: CT IMAGING | Facility: HOSPITAL | Age: 37
End: 2022-09-02
Payer: COMMERCIAL

## 2022-09-02 ENCOUNTER — HOSPITAL ENCOUNTER (EMERGENCY)
Facility: HOSPITAL | Age: 37
Discharge: HOME | End: 2022-09-02
Attending: EMERGENCY MEDICINE
Payer: COMMERCIAL

## 2022-09-02 VITALS
HEART RATE: 64 BPM | DIASTOLIC BLOOD PRESSURE: 67 MMHG | RESPIRATION RATE: 16 BRPM | OXYGEN SATURATION: 100 % | SYSTOLIC BLOOD PRESSURE: 106 MMHG | TEMPERATURE: 98.8 F

## 2022-09-02 DIAGNOSIS — R10.31 RIGHT LOWER QUADRANT ABDOMINAL PAIN: Primary | ICD-10-CM

## 2022-09-02 DIAGNOSIS — R10.9 RIGHT FLANK PAIN: ICD-10-CM

## 2022-09-02 LAB
ALBUMIN SERPL BCP-MCNC: 3.7 G/DL (ref 3.5–5)
ALP SERPL-CCNC: 109 U/L (ref 46–116)
ALT SERPL W P-5'-P-CCNC: 23 U/L (ref 12–78)
ANION GAP SERPL CALCULATED.3IONS-SCNC: 7 MMOL/L (ref 4–13)
AST SERPL W P-5'-P-CCNC: 13 U/L (ref 5–45)
BASOPHILS # BLD AUTO: 0.1 THOUSANDS/ΜL (ref 0–0.1)
BASOPHILS NFR BLD AUTO: 1 % (ref 0–1)
BILIRUB SERPL-MCNC: 0.24 MG/DL (ref 0.2–1)
BILIRUB UR QL STRIP: NEGATIVE
BUN SERPL-MCNC: 7 MG/DL (ref 5–25)
CALCIUM SERPL-MCNC: 8.6 MG/DL (ref 8.3–10.1)
CHLORIDE SERPL-SCNC: 104 MMOL/L (ref 96–108)
CLARITY UR: CLEAR
CO2 SERPL-SCNC: 27 MMOL/L (ref 21–32)
COLOR UR: YELLOW
CREAT SERPL-MCNC: 0.8 MG/DL (ref 0.6–1.3)
EOSINOPHIL # BLD AUTO: 0.48 THOUSAND/ΜL (ref 0–0.61)
EOSINOPHIL NFR BLD AUTO: 5 % (ref 0–6)
ERYTHROCYTE [DISTWIDTH] IN BLOOD BY AUTOMATED COUNT: 13.3 % (ref 11.6–15.1)
EXT PREG TEST URINE: NEGATIVE
EXT. CONTROL ED NAV: NORMAL
GFR SERPL CREATININE-BSD FRML MDRD: 93 ML/MIN/1.73SQ M
GLUCOSE SERPL-MCNC: 110 MG/DL (ref 65–140)
GLUCOSE UR STRIP-MCNC: NEGATIVE MG/DL
HCT VFR BLD AUTO: 39 % (ref 34.8–46.1)
HGB BLD-MCNC: 12.9 G/DL (ref 11.5–15.4)
HGB UR QL STRIP.AUTO: NEGATIVE
IMM GRANULOCYTES # BLD AUTO: 0.02 THOUSAND/UL (ref 0–0.2)
IMM GRANULOCYTES NFR BLD AUTO: 0 % (ref 0–2)
KETONES UR STRIP-MCNC: NEGATIVE MG/DL
LEUKOCYTE ESTERASE UR QL STRIP: NEGATIVE
LIPASE SERPL-CCNC: 106 U/L (ref 73–393)
LYMPHOCYTES # BLD AUTO: 3.13 THOUSANDS/ΜL (ref 0.6–4.47)
LYMPHOCYTES NFR BLD AUTO: 32 % (ref 14–44)
MCH RBC QN AUTO: 28.8 PG (ref 26.8–34.3)
MCHC RBC AUTO-ENTMCNC: 33.1 G/DL (ref 31.4–37.4)
MCV RBC AUTO: 87 FL (ref 82–98)
MONOCYTES # BLD AUTO: 0.7 THOUSAND/ΜL (ref 0.17–1.22)
MONOCYTES NFR BLD AUTO: 7 % (ref 4–12)
NEUTROPHILS # BLD AUTO: 5.24 THOUSANDS/ΜL (ref 1.85–7.62)
NEUTS SEG NFR BLD AUTO: 55 % (ref 43–75)
NITRITE UR QL STRIP: NEGATIVE
NRBC BLD AUTO-RTO: 0 /100 WBCS
PH UR STRIP.AUTO: 5.5 [PH]
PLATELET # BLD AUTO: 359 THOUSANDS/UL (ref 149–390)
PMV BLD AUTO: 9.6 FL (ref 8.9–12.7)
POTASSIUM SERPL-SCNC: 3.4 MMOL/L (ref 3.5–5.3)
PROT SERPL-MCNC: 7.8 G/DL (ref 6.4–8.4)
PROT UR STRIP-MCNC: NEGATIVE MG/DL
RBC # BLD AUTO: 4.48 MILLION/UL (ref 3.81–5.12)
SODIUM SERPL-SCNC: 138 MMOL/L (ref 135–147)
SP GR UR STRIP.AUTO: >=1.03 (ref 1–1.03)
UROBILINOGEN UR QL STRIP.AUTO: 0.2 E.U./DL
WBC # BLD AUTO: 9.67 THOUSAND/UL (ref 4.31–10.16)

## 2022-09-02 PROCEDURE — 96375 TX/PRO/DX INJ NEW DRUG ADDON: CPT

## 2022-09-02 PROCEDURE — G1004 CDSM NDSC: HCPCS

## 2022-09-02 PROCEDURE — 74177 CT ABD & PELVIS W/CONTRAST: CPT

## 2022-09-02 PROCEDURE — 96361 HYDRATE IV INFUSION ADD-ON: CPT

## 2022-09-02 PROCEDURE — 99284 EMERGENCY DEPT VISIT MOD MDM: CPT | Performed by: PHYSICIAN ASSISTANT

## 2022-09-02 PROCEDURE — 85025 COMPLETE CBC W/AUTO DIFF WBC: CPT | Performed by: PHYSICIAN ASSISTANT

## 2022-09-02 PROCEDURE — 96374 THER/PROPH/DIAG INJ IV PUSH: CPT

## 2022-09-02 PROCEDURE — 81025 URINE PREGNANCY TEST: CPT | Performed by: PHYSICIAN ASSISTANT

## 2022-09-02 PROCEDURE — 36415 COLL VENOUS BLD VENIPUNCTURE: CPT | Performed by: PHYSICIAN ASSISTANT

## 2022-09-02 PROCEDURE — 99284 EMERGENCY DEPT VISIT MOD MDM: CPT

## 2022-09-02 PROCEDURE — 80053 COMPREHEN METABOLIC PANEL: CPT | Performed by: PHYSICIAN ASSISTANT

## 2022-09-02 PROCEDURE — 81003 URINALYSIS AUTO W/O SCOPE: CPT | Performed by: PHYSICIAN ASSISTANT

## 2022-09-02 PROCEDURE — 83690 ASSAY OF LIPASE: CPT | Performed by: PHYSICIAN ASSISTANT

## 2022-09-02 RX ORDER — KETOROLAC TROMETHAMINE 30 MG/ML
15 INJECTION, SOLUTION INTRAMUSCULAR; INTRAVENOUS ONCE
Status: COMPLETED | OUTPATIENT
Start: 2022-09-02 | End: 2022-09-02

## 2022-09-02 RX ORDER — METHOCARBAMOL 500 MG/1
500 TABLET, FILM COATED ORAL 2 TIMES DAILY
Qty: 20 TABLET | Refills: 0 | Status: SHIPPED | OUTPATIENT
Start: 2022-09-02

## 2022-09-02 RX ORDER — ONDANSETRON 2 MG/ML
4 INJECTION INTRAMUSCULAR; INTRAVENOUS ONCE
Status: COMPLETED | OUTPATIENT
Start: 2022-09-02 | End: 2022-09-02

## 2022-09-02 RX ADMIN — SODIUM CHLORIDE 1000 ML: 0.9 INJECTION, SOLUTION INTRAVENOUS at 03:45

## 2022-09-02 RX ADMIN — KETOROLAC TROMETHAMINE 15 MG: 30 INJECTION, SOLUTION INTRAMUSCULAR at 03:43

## 2022-09-02 RX ADMIN — IOHEXOL 100 ML: 350 INJECTION, SOLUTION INTRAVENOUS at 05:03

## 2022-09-02 RX ADMIN — ONDANSETRON 4 MG: 2 INJECTION INTRAMUSCULAR; INTRAVENOUS at 03:43

## 2022-09-02 NOTE — DISCHARGE INSTRUCTIONS
You were seen in the emergency department today for right lower quadrant abdominal pain  Laboratory analysis and Radiologic imaging did not reveal any acute abnormalities  Please follow-up with your primary care physician regarding your symptoms  Return to the Emergency Department sooner if increased pain, fever, vomiting, diarrhea, difficulty breathing or urinating, bleeding  Clear fluids for 1-2 days and then advance diet as tolerated  Tylenol/Motrin home  Robaxin as needed for muscle aches    This medication can make you sleepy

## 2022-09-02 NOTE — ED PROVIDER NOTES
History  Chief Complaint   Patient presents with    Abdominal Pain     RLQ ab pain since last night     Caridad Valdez is a 42 y o   female with PMH of asthma who presents to the emergency department with right lower quadrant abdominal pain  Patient states for approximately last 24 hours she has had right lower quadrant abdominal pain  She states at times it does seem to radiate into the right flank  She denies any nausea, vomiting, diarrhea  She denies any urinary complaints including frequency, urgency, dysuria hematuria  She denies any vaginal bleeding or vaginal discharge  She denies any recent trauma or rashes  She denies any chest pain shortness of breath palpitations dizziness or lightheadedness  She states she has never had anything like this in the past and nothing seems to make it better or worse  Still tolerating p o  No medications prior to arrival   Denies any previous abdominal surgeries                History provided by:  Patient   used: No    Abdominal Pain  Pain location:  R flank and RLQ  Pain quality: aching and gnawing    Pain radiates to:  R flank  Pain severity:  Moderate  Onset quality:  Gradual  Duration:  1 day  Timing:  Constant  Progression:  Unchanged  Chronicity:  New  Context: not alcohol use, not awakening from sleep, not diet changes, not eating, not laxative use, not medication withdrawal, not previous surgeries, not recent illness, not recent sexual activity, not recent travel, not retching, not sick contacts, not suspicious food intake and not trauma    Relieved by:  None tried  Worsened by:  Palpation  Ineffective treatments:  None tried  Associated symptoms: no anorexia, no belching, no chest pain, no chills, no constipation, no cough, no diarrhea, no dysuria, no fatigue, no fever, no flatus, no hematemesis, no hematochezia, no hematuria, no melena, no nausea, no shortness of breath, no sore throat, no vaginal bleeding, no vaginal discharge and no vomiting        Prior to Admission Medications   Prescriptions Last Dose Informant Patient Reported? Taking? Ascorbic Acid (vitamin C) 1000 MG tablet   No No   Sig: Take 1 tablet (1,000 mg total) by mouth 2 (two) times a day for 14 days   Multiple Vitamin (multivitamin) tablet   No No   Sig: Take 1 tablet by mouth daily   Promethazine-DM (PHENERGAN-DM) 6 25-15 mg/5 mL oral syrup   No No   Sig: Take 5 mL by mouth 4 (four) times a day as needed for cough   albuterol (PROVENTIL HFA,VENTOLIN HFA) 90 mcg/act inhaler   No No   Sig: Inhale 2 puffs every 4 (four) hours as needed for wheezing   cholecalciferol (VITAMIN D3) 1,000 units tablet   No No   Sig: Take 2 tablets (2,000 Units total) by mouth daily for 14 days      Facility-Administered Medications: None       Past Medical History:   Diagnosis Date    Asthma     Psychiatric disorder        Past Surgical History:   Procedure Laterality Date     SECTION      NO PAST SURGERIES         History reviewed  No pertinent family history  I have reviewed and agree with the history as documented  E-Cigarette/Vaping     E-Cigarette/Vaping Substances     Social History     Tobacco Use    Smoking status: Current Every Day Smoker     Packs/day: 2 00     Types: Cigarettes    Smokeless tobacco: Never Used   Substance Use Topics    Alcohol use: Not Currently     Comment: socially     Drug use: Not Currently       Review of Systems   Constitutional: Negative for activity change, appetite change, chills, diaphoresis, fatigue, fever and unexpected weight change  HENT: Negative for congestion, dental problem, ear pain, mouth sores, nosebleeds, sinus pressure, sinus pain, sneezing, sore throat and trouble swallowing  Respiratory: Negative for apnea, cough, choking, chest tightness, shortness of breath, wheezing and stridor  Cardiovascular: Negative for chest pain, palpitations and leg swelling  Gastrointestinal: Positive for abdominal pain  Negative for anorexia, constipation, diarrhea, flatus, hematemesis, hematochezia, melena, nausea and vomiting  Genitourinary: Positive for flank pain  Negative for dysuria, frequency, hematuria, urgency, vaginal bleeding and vaginal discharge  Musculoskeletal: Negative for arthralgias, joint swelling and myalgias  Skin: Negative for color change, pallor and rash  Neurological: Negative for dizziness, tremors, seizures, syncope, speech difficulty, weakness, light-headedness, numbness and headaches  All other systems reviewed and are negative  Physical Exam  Physical Exam  Vitals and nursing note reviewed  Constitutional:       General: She is not in acute distress  Appearance: Normal appearance  She is normal weight  She is not ill-appearing or toxic-appearing  HENT:      Head: Normocephalic and atraumatic  Mouth/Throat:      Mouth: Mucous membranes are moist       Pharynx: Oropharynx is clear  Eyes:      Extraocular Movements: Extraocular movements intact  Pupils: Pupils are equal, round, and reactive to light  Cardiovascular:      Rate and Rhythm: Normal rate and regular rhythm  Pulses: Normal pulses  Heart sounds: Normal heart sounds  No murmur heard  No friction rub  No gallop  Pulmonary:      Effort: Pulmonary effort is normal  No respiratory distress  Breath sounds: Normal breath sounds  No stridor  No wheezing, rhonchi or rales  Abdominal:      General: Abdomen is flat  Bowel sounds are normal  There is no distension  Palpations: Abdomen is soft  There is no mass  Tenderness: There is abdominal tenderness in the right lower quadrant  There is no right CVA tenderness, left CVA tenderness, guarding or rebound  Negative signs include Lara's sign, Rovsing's sign, McBurney's sign, psoas sign and obturator sign  Hernia: No hernia is present  Musculoskeletal:         General: No swelling, tenderness, deformity or signs of injury   Normal range of motion  Cervical back: Normal range of motion  No rigidity or tenderness  Right lower leg: No edema  Left lower leg: No edema  Comments: No midline tenderness step-offs or deformities in the C, T, L-spine  No paraspinal muscle tenderness  No CVA tenderness  Skin:     General: Skin is warm and dry  Capillary Refill: Capillary refill takes less than 2 seconds  Neurological:      General: No focal deficit present  Mental Status: She is alert and oriented to person, place, and time  Mental status is at baseline  Cranial Nerves: No cranial nerve deficit  Sensory: No sensory deficit  Motor: No weakness        Coordination: Coordination normal          Vital Signs  ED Triage Vitals   Temperature Pulse Respirations Blood Pressure SpO2   09/02/22 0132 09/02/22 0132 09/02/22 0132 09/02/22 0132 09/02/22 0132   98 8 °F (37 1 °C) 94 18 106/61 99 %      Temp src Heart Rate Source Patient Position - Orthostatic VS BP Location FiO2 (%)   -- 09/02/22 0358 -- -- --    Monitor         Pain Score       09/02/22 0417       2           Vitals:    09/02/22 0132 09/02/22 0358   BP: 106/61 110/59   Pulse: 94 88         Visual Acuity      ED Medications  Medications   ondansetron (ZOFRAN) injection 4 mg (4 mg Intravenous Given 9/2/22 0343)   sodium chloride 0 9 % bolus 1,000 mL (0 mL Intravenous Stopped 9/2/22 0434)   ketorolac (TORADOL) injection 15 mg (15 mg Intravenous Given 9/2/22 0343)   iohexol (OMNIPAQUE) 350 MG/ML injection (SINGLE-DOSE) 100 mL (100 mL Intravenous Given 9/2/22 0503)       Diagnostic Studies  Results Reviewed     Procedure Component Value Units Date/Time    UA w Reflex to Microscopic w Reflex to Culture [[id number]] Collected: 09/02/22 0437    Lab Status: Final result Specimen: Urine, Clean Catch Updated: 09/02/22 0501     Color, UA Yellow     Clarity, UA Clear     Specific Gravity, UA >=1 030     pH, UA 5 5     Leukocytes, UA Negative     Nitrite, UA Negative Protein, UA Negative mg/dl      Glucose, UA Negative mg/dl      Ketones, UA Negative mg/dl      Urobilinogen, UA 0 2 E U /dl      Bilirubin, UA Negative     Occult Blood, UA Negative    POCT pregnancy, urine [[id number]]  (Normal) Resulted: 09/02/22 0438    Lab Status: Final result Updated: 09/02/22 0438     EXT PREG TEST UR (Ref: Negative) NEGATIVE     Control VALID    Lipase [[id number]]  (Normal) Collected: 09/02/22 0340    Lab Status: Final result Specimen: Blood from Arm, Right Updated: 09/02/22 0408     Lipase 106 u/L     Comprehensive metabolic panel [[id number]]  (Abnormal) Collected: 09/02/22 0340    Lab Status: Final result Specimen: Blood from Arm, Right Updated: 09/02/22 0408     Sodium 138 mmol/L      Potassium 3 4 mmol/L      Chloride 104 mmol/L      CO2 27 mmol/L      ANION GAP 7 mmol/L      BUN 7 mg/dL      Creatinine 0 80 mg/dL      Glucose 110 mg/dL      Calcium 8 6 mg/dL      AST 13 U/L      ALT 23 U/L      Alkaline Phosphatase 109 U/L      Total Protein 7 8 g/dL      Albumin 3 7 g/dL      Total Bilirubin 0 24 mg/dL      eGFR 93 ml/min/1 73sq m     Narrative:      [place] guidelines for Chronic Kidney Disease (CKD):     Stage 1 with normal or high GFR (GFR > 90 mL/min/1 73 square meters)    Stage 2 Mild CKD (GFR = 60-89 mL/min/1 73 square meters)    Stage 3A Moderate CKD (GFR = 45-59 mL/min/1 73 square meters)    Stage 3B Moderate CKD (GFR = 30-44 mL/min/1 73 square meters)    Stage 4 Severe CKD (GFR = 15-29 mL/min/1 73 square meters)    Stage 5 End Stage CKD (GFR <15 mL/min/1 73 square meters)  Note: GFR calculation is accurate only with a steady state creatinine    CBC and differential [[id number]] Collected: 09/02/22 0340    Lab Status: Final result Specimen: Blood from Arm, Right Updated: 09/02/22 0351     WBC 9 67 Thousand/uL      RBC 4 48 Million/uL      Hemoglobin 12 9 g/dL      Hematocrit 39 0 %      MCV 87 fL      MCH 28 8 pg      MCHC 33 1 g/dL      RDW 13 3 %      MPV 9 6 fL      Platelets 704 Thousands/uL      nRBC 0 /100 WBCs      Neutrophils Relative 55 %      Immat GRANS % 0 %      Lymphocytes Relative 32 %      Monocytes Relative 7 %      Eosinophils Relative 5 %      Basophils Relative 1 %      Neutrophils Absolute 5 24 Thousands/µL      Immature Grans Absolute 0 02 Thousand/uL      Lymphocytes Absolute 3 13 Thousands/µL      Monocytes Absolute 0 70 Thousand/µL      Eosinophils Absolute 0 48 Thousand/µL      Basophils Absolute 0 10 Thousands/µL                  CT abdomen pelvis with contrast   Final Result by Yahaira Zelaya MD (09/02 0510)      No acute pathology  Normal appendix  Workstation performed: [id number]                    Procedures  Procedures         ED Course                                             MDM  Number of Diagnoses or Management Options  Right flank pain: new and requires workup  Right lower quadrant abdominal pain: new and requires workup  Diagnosis management comments: Patient was seen and examined  in the emergency department for chief complaint of right lower quadrant abdominal pain  The patient presented 1 day of right lower quadrant abdominal pain without any other associated symptoms  On exam patient is in no acute distress, lungs are clear  Regular rate rhythm, murmurs rubs or gallops  Abdomen is minimally tender in the right lower quadrant without rebound or guarding  No rashes  No evidence of trauma         DDx including but not limited to: appendicitis, gastroenteritis, gastritis, PUD, GERD, gastroparesis, hepatitis, pancreatitis, colitis, enteritis, food poisoning, mesenteric adenitis, epiploic appendagitis, IBD, IBS, ileus, bowel obstruction, volvulus, cholecystitis, biliary colic, choledocholithiasis, perforated viscus, tumor, splenic etiology, diverticulitis, internal hernia, constipation, pelvic pathology, renal colic, pyelonephritis, UTI  Workup:   Will check labs, urinalysis, urine pregnancy, CT abdomen pelvis with  Symptomatic treatment  On reassessment all symptoms have resolved  Abdomen soft nontender nondistended no rebound or guarding  Right lower quadrant pain has resolved  Workup is reassuring  Recommended follow-up with PCP  Return precautions discussed  Disposition:  General impression 40-year-old female with right flank pain/right lower quadrant pain  Workup reassuring  Symptoms resolved  Abdomen soft nontender nondistended, nonacute abdomen  Return precautions and PCP follow-up discussed  The patient was discharged in stable condition  Patient ambulated off the department  Extensive return to emergency department precautions were discussed  Follow up with appropriate providers including primary care physician was discussed  Patient and/or their  primary decision maker expressed understanding  Patient remained stable during entire emergency department stay           Amount and/or Complexity of Data Reviewed  Clinical lab tests: ordered and reviewed  Tests in the radiology section of CPT®: ordered and reviewed  Review and summarize past medical records: yes  Independent visualization of images, tracings, or specimens: yes    Risk of Complications, Morbidity, and/or Mortality  Presenting problems: moderate  Diagnostic procedures: moderate  Management options: moderate    Patient Progress  Patient progress: stable      Disposition  Final diagnoses:   Right lower quadrant abdominal pain   Right flank pain     Time reflects when diagnosis was documented in both MDM as applicable and the Disposition within this note     Time User Action Codes Description Comment    9/2/2022  5:17 AM Yahaira Matter Add [R10 31] Right lower quadrant abdominal pain     9/2/2022  5:17 AM Yahaira Matter Add [R10 9] Right flank pain       ED Disposition     ED Disposition   Discharge    Condition   Stable    Date/Time   Fri Sep 2, 2022  5:18 AM    Comment   [address] Erica Ellison discharge to home/self care  Follow-up Information     Follow up With Specialties Details Why Contact Info Additional [address] [facility] Emergency Department Emergency Medicine Go to  As needed, If symptoms worsen [facility] [id number] [address] [facility] Emergency Department, [facility] [address]  Call  To schedule an appointment with a primary care physician [phone]             Patient's Medications   Discharge Prescriptions    METHOCARBAMOL (ROBAXIN) 500 MG TABLET    Take 1 tablet (500 mg total) by mouth 2 (two) times a day       Start Date: 9/2/2022  End Date: --       Order Dose: 500 mg       Quantity: 20 tablet    Refills: 0       No discharge procedures on file      PDMP Review     None          ED Provider  Electronically Signed by           Eileen Howell PA-C  09/02/22 0879

## 2022-09-02 NOTE — Clinical Note
Yessenia Spaulding was seen and treated in our emergency department on 9/2/2022  Diagnosis: Abdominal pain    Micha Brown  may return to work on return date  She may return on this date: 09/04/2022         If you have any questions or concerns, please don't hesitate to call        January Andre PA-C    ______________________________           _______________          _______________  Hospital Representative                              Date                                Time

## 2022-09-02 NOTE — ED NOTES
Patient aware that urine specimen is needed   Unable to provide sample at this time     Vassie Opitz, RN  09/02/22 7635

## 2022-10-04 ENCOUNTER — APPOINTMENT (OUTPATIENT)
Dept: RADIOLOGY | Facility: HOSPITAL | Age: 37
End: 2022-10-04
Payer: COMMERCIAL

## 2022-10-04 ENCOUNTER — HOSPITAL ENCOUNTER (EMERGENCY)
Facility: HOSPITAL | Age: 37
Discharge: HOME | End: 2022-10-04
Attending: EMERGENCY MEDICINE
Payer: COMMERCIAL

## 2022-10-04 VITALS
SYSTOLIC BLOOD PRESSURE: 124 MMHG | HEART RATE: 94 BPM | BODY MASS INDEX: 30.08 KG/M2 | OXYGEN SATURATION: 100 % | DIASTOLIC BLOOD PRESSURE: 64 MMHG | RESPIRATION RATE: 18 BRPM | WEIGHT: 175.27 LBS | TEMPERATURE: 97.5 F

## 2022-10-04 DIAGNOSIS — M79.644 THUMB PAIN, RIGHT: Primary | ICD-10-CM

## 2022-10-04 PROCEDURE — 99283 EMERGENCY DEPT VISIT LOW MDM: CPT

## 2022-10-04 PROCEDURE — 99282 EMERGENCY DEPT VISIT SF MDM: CPT

## 2022-10-04 PROCEDURE — 73140 X-RAY EXAM OF FINGER(S): CPT

## 2022-10-04 RX ORDER — ACETAMINOPHEN 325 MG/1
975 TABLET ORAL ONCE
Status: COMPLETED | OUTPATIENT
Start: 2022-10-04 | End: 2022-10-04

## 2022-10-04 RX ADMIN — ACETAMINOPHEN 975 MG: 325 TABLET ORAL at 21:57

## 2022-10-04 NOTE — Clinical Note
Marya Padilla was seen and treated in our emergency department on 10/4/2022  Diagnosis:     Leesaarie    She may return on this date:     No work x 2 days  Light duty/no work involving right hand until cleared by Hand or Ortho      If you have any questions or concerns, please don't hesitate to call        Kait Garcia PA-C    ______________________________           _______________          _______________  Hospital Representative                              Date                                Time

## 2022-10-04 NOTE — Clinical Note
Holly Mohr was seen and treated in our emergency department on 10/4/2022  Diagnosis:     Marilee    She may return on this date:     No work x 2 days  Light duty/no work involving right hand until cleared by Hand or Ortho      If you have any questions or concerns, please don't hesitate to call        Nandini Gilmore PA-C    ______________________________           _______________          _______________  Hospital Representative                              Date                                Time

## 2022-10-05 NOTE — DISCHARGE INSTRUCTIONS
You may take Tylenol 500 to 1000 mg three times a day as needed  Do not exceed 3000 mg of tylenol a day as this can cause liver damage  You may also take an non steroidal anti-inflammatory (NSAID) such as Motrin, ibuprofen or Advil, 400mg every 6 hours  Taking both of these medications (alternating doses, for example: Tylenol at 9 am, Motrin at 12 pm, Tylenol at 3 pm, Motrin at 6 pm, etc) in an overlapping fashion may help reduce inflammation and control pain      Follow up with Hand Surgery within 1 week  Wear splint until follow up with hand surgery

## 2022-10-05 NOTE — ED NOTES
Discharge instructions reviewed with patient by provider  Verbalized understanding with no further questions at this time        Ronnie Liu RN  10/04/22 0435

## 2022-10-06 ENCOUNTER — TELEPHONE (OUTPATIENT)
Dept: OBGYN CLINIC | Facility: HOSPITAL | Age: 37
End: 2022-10-06
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
## 2022-10-06 NOTE — TELEPHONE ENCOUNTER
Hello,  Please advise if the following patient can be forced onto the schedule:    Dahiana Mott    VYS:[id number]    TTY:[phone]    Call back #:[phone]    Insurance:[facility] for you    Reason for appointment:n/p rt hand    Requested doctor/location:Dr Corrigan/[place]    Pt does not speak english    Thank you    Ame Soares

## 2022-10-07 ENCOUNTER — TELEPHONE (OUTPATIENT)
Dept: OBGYN CLINIC | Facility: CLINIC | Age: 37
End: 2022-10-07

## 2022-10-10 ENCOUNTER — TELEPHONE (OUTPATIENT)
Dept: OBGYN CLINIC | Facility: CLINIC | Age: 37
End: 2022-10-10

## 2022-10-10 NOTE — TELEPHONE ENCOUNTER
, Ms Abhijit David, states that PT aware they have selected a [facility] provider, who is  not in [facility] panel  Messages left for PT   Shared with Ortho office

## 2023-01-10 ENCOUNTER — OFFICE VISIT (OUTPATIENT)
Dept: DENTISTRY | Facility: CLINIC | Age: 38
End: 2023-01-10

## 2023-01-10 VITALS — SYSTOLIC BLOOD PRESSURE: 135 MMHG | DIASTOLIC BLOOD PRESSURE: 89 MMHG | HEART RATE: 79 BPM | TEMPERATURE: 98.2 F

## 2023-01-10 DIAGNOSIS — K04.7 DENTAL ABSCESS: Primary | ICD-10-CM

## 2023-01-17 ENCOUNTER — TELEPHONE (OUTPATIENT)
Dept: OBGYN CLINIC | Facility: OTHER | Age: 38
End: 2023-01-17

## 2023-01-17 ENCOUNTER — OFFICE VISIT (OUTPATIENT)
Dept: FAMILY MEDICINE CLINIC | Facility: CLINIC | Age: 38
End: 2023-01-17

## 2023-01-17 VITALS
RESPIRATION RATE: 18 BRPM | DIASTOLIC BLOOD PRESSURE: 76 MMHG | WEIGHT: 179 LBS | SYSTOLIC BLOOD PRESSURE: 138 MMHG | HEIGHT: 63 IN | OXYGEN SATURATION: 98 % | BODY MASS INDEX: 31.71 KG/M2 | TEMPERATURE: 97.8 F | HEART RATE: 102 BPM

## 2023-01-17 DIAGNOSIS — E66.9 OBESITY (BMI 30-39.9): ICD-10-CM

## 2023-01-17 DIAGNOSIS — G89.29 CHRONIC HAND PAIN, RIGHT: Primary | ICD-10-CM

## 2023-01-17 DIAGNOSIS — Z00.00 ENCOUNTER FOR MEDICAL EXAMINATION TO ESTABLISH CARE: ICD-10-CM

## 2023-01-17 DIAGNOSIS — M79.641 CHRONIC HAND PAIN, RIGHT: Primary | ICD-10-CM

## 2023-01-17 DIAGNOSIS — J45.20 MILD INTERMITTENT ASTHMA WITHOUT COMPLICATION: ICD-10-CM

## 2023-01-17 DIAGNOSIS — Z00.00 HEALTHCARE MAINTENANCE: ICD-10-CM

## 2023-01-17 DIAGNOSIS — Z11.3 SCREEN FOR STD (SEXUALLY TRANSMITTED DISEASE): ICD-10-CM

## 2023-01-17 PROBLEM — K92.0 HEMATEMESIS: Status: RESOLVED | Noted: 2019-02-28 | Resolved: 2023-01-17

## 2023-01-17 PROBLEM — R56.9 WITNESSED SEIZURE-LIKE ACTIVITY (HCC): Status: RESOLVED | Noted: 2019-03-03 | Resolved: 2023-01-17

## 2023-01-17 PROBLEM — M25.519 SHOULDER PAIN: Status: RESOLVED | Noted: 2019-02-28 | Resolved: 2023-01-17

## 2023-01-17 PROBLEM — K92.0 HEMATEMESIS WITH NAUSEA: Status: RESOLVED | Noted: 2019-02-28 | Resolved: 2023-01-17

## 2023-01-17 PROBLEM — K92.2 GI BLEED: Status: RESOLVED | Noted: 2019-02-28 | Resolved: 2023-01-17

## 2023-01-17 PROBLEM — E87.6 HYPOKALEMIA: Status: RESOLVED | Noted: 2019-02-28 | Resolved: 2023-01-17

## 2023-01-17 RX ORDER — ALBUTEROL SULFATE 90 UG/1
2 AEROSOL, METERED RESPIRATORY (INHALATION) EVERY 4 HOURS PRN
Qty: 18 G | Refills: 1 | Status: SHIPPED | OUTPATIENT
Start: 2023-01-17

## 2023-01-17 NOTE — TELEPHONE ENCOUNTER
Patient is being referred to a orthopedics  Please schedule accordingly      [facility]   [phone]

## 2023-01-17 NOTE — ASSESSMENT & PLAN NOTE
BMI Counseling: Body mass index is 31 71 kg/m²  The BMI is above normal  Nutrition recommendations include reducing portion sizes, decreasing overall calorie intake, 3-5 servings of fruits/vegetables daily, reducing fast food intake, consuming healthier snacks, decreasing soda and/or juice intake, moderation in carbohydrate intake, increasing intake of lean protein, reducing intake of saturated fat and trans fat and reducing intake of cholesterol  Exercise recommendations include moderate aerobic physical activity for 150 minutes/week
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
## 2023-01-17 NOTE — PROGRESS NOTES
S: " I think I have an infection on my upper right"  O: clincal eval reveals decayed and fractured teeth #4 and 5  radiograghic eval reveals radioleucency ant th apices of both teeth  A: necrotic pulp and CAP #4 and #5  P: ext #4and 5 and RX: antibiotics

## 2023-01-23 ENCOUNTER — TELEPHONE (OUTPATIENT)
Dept: OBGYN CLINIC | Facility: MEDICAL CENTER | Age: 38
End: 2023-01-23

## 2023-01-23 NOTE — TELEPHONE ENCOUNTER
Caller:  Frank Minor     Doctor: hand     Reason for call: Patient needs an appointment - right hand  Pt Only speaks [place]  I see she needs an MRI prior       Call back#: [phone]

## 2023-03-14 ENCOUNTER — OFFICE VISIT (OUTPATIENT)
Dept: DENTISTRY | Facility: CLINIC | Age: 38
End: 2023-03-14

## 2023-03-14 VITALS — HEART RATE: 108 BPM | TEMPERATURE: 98 F | SYSTOLIC BLOOD PRESSURE: 103 MMHG | DIASTOLIC BLOOD PRESSURE: 73 MMHG

## 2023-03-14 DIAGNOSIS — Z01.20 ENCOUNTER FOR DENTAL EXAMINATION: Primary | ICD-10-CM

## 2023-03-14 RX ORDER — AMOXICILLIN 500 MG/1
500 CAPSULE ORAL EVERY 8 HOURS SCHEDULED
Qty: 21 CAPSULE | Refills: 0 | Status: SHIPPED | OUTPATIENT
Start: 2023-03-14 | End: 2023-03-21

## 2023-03-14 NOTE — PROGRESS NOTES
Subjective   Patient ID: Kimberlyn Van is a 42 y o  female    Chief Complaint   Patient presents with   • Emergency/limited Exam     Reviewed medical history   ASA

## 2023-03-14 NOTE — DENTAL PROCEDURE DETAILS
Patient ID: Ruben Tony is a 42 y o  female  Chief Complaint   Patient presents with    Emergency/limited Exam     Reviewed medical history   ASA I     Patient presents with pain #9, interproximal pain with palpation, worn incisial edge, pulp exposed, endo ice test tooth is neurotic  Dr Remington Lamar gave patient  Amoxicllin 500mg 21 tablets  I spent 30 min with translation pt began complaining about #4 & #5 extraction and then about health history that she is not taking any meds  She said she is here for cleaning, I had explained to her she is here for emerg treatment  Upon completing that she said #9 is giving her pain  ( #9 pre-auth endo), but she needs to come in for complete exam & fmx     Exam by Dr Remington Lamar    # [id number]    NV: 1 Comp exam & FMX

## 2023-03-15 RX ORDER — QUETIAPINE FUMARATE 50 MG/1
TABLET, FILM COATED ORAL
COMMUNITY
Start: 2022-12-07

## 2023-03-15 RX ORDER — DIVALPROEX SODIUM 250 MG/1
800 TABLET, DELAYED RELEASE ORAL 2 TIMES DAILY
COMMUNITY

## 2023-03-16 ENCOUNTER — OFFICE VISIT (OUTPATIENT)
Dept: DENTISTRY | Facility: CLINIC | Age: 38
End: 2023-03-16

## 2023-03-16 VITALS — HEART RATE: 109 BPM | DIASTOLIC BLOOD PRESSURE: 87 MMHG | SYSTOLIC BLOOD PRESSURE: 127 MMHG | TEMPERATURE: 98 F

## 2023-03-16 DIAGNOSIS — Z01.21 ENCOUNTER FOR DENTAL EXAMINATION AND CLEANING WITH ABNORMAL FINDINGS: Primary | ICD-10-CM

## 2023-03-16 NOTE — DENTAL PROCEDURE DETAILS
Rema Loera presents for a Comprehensive exam  Verbal consent for treatment given in addition to the forms  Patient arrived with daughter 14 min late     Reviewed health history - Patient is ASA II  Consents signed: Yes     Perio: Normal  Pain Scale: 0  Caries Assessment: Medium  Radiographs: Complete mouth series     TRANSLATION USED  ( [id number] ROOMING   [id number] ROMY  EXAM)    APPROX  [address] TOTAL WITH TRANSLATION    Patient is missing most posterior teeth and has a lot of anterior wear/ edge to edge occlusion  She is very interested in replacing missing teeth ( dentures/ Partial dentures)    Exam  DR Hoa Ricketts noted # 20 DO and large carious lesion # 19  Explained ( several times) with translation that # 23 will likely need a RCT followed by crown to retain the tooth   patient is very concerned about finances and repeatedly asked about costs associated with procedures    Dr Jeronimo Cope explained we cannot discuss fees   will be able to give her estimates after diagnostic casts and treatment planning visit    NV  diagnostic cast/ TX plan  discuss options  ( CD/ RPD)  translation will be needed  60 min  NV 2  saji # 20   treatment # 19 as decided  50 min naeem     Recommended Hygiene recall visits with Oswaldo Fleischer     Referrals needed: No

## 2023-03-18 PROBLEM — Z00.00 HEALTHCARE MAINTENANCE: Status: RESOLVED | Noted: 2023-01-17 | Resolved: 2023-03-18

## 2023-04-21 ENCOUNTER — TELEPHONE (OUTPATIENT)
Dept: FAMILY MEDICINE CLINIC | Facility: CLINIC | Age: 38
End: 2023-04-21

## 2023-04-21 DIAGNOSIS — M79.641 CHRONIC HAND PAIN, RIGHT: Primary | ICD-10-CM

## 2023-04-21 DIAGNOSIS — G89.29 CHRONIC HAND PAIN, RIGHT: Primary | ICD-10-CM

## 2023-04-21 NOTE — TELEPHONE ENCOUNTER
Hi, I'm calling from [facility]  I had left a voice mail before for a mutual patient  It's Luh Brownheladio Wei and her YOB: 1985  She has an appointment on Monday for Doctor Rowena Yen and she needs a referral because she has [facility] and she doesn't [facility] for you insurance  She doesn't have a referral then we won't be able to see her and she only speaks [place] so we can, I've heard get that referral over to us  We can it can be faxed [phone]  Thank you 
done

## 2023-04-24 ENCOUNTER — TELEPHONE (OUTPATIENT)
Dept: FAMILY MEDICINE CLINIC | Facility: CLINIC | Age: 38
End: 2023-04-24

## 2023-04-24 NOTE — TELEPHONE ENCOUNTER
Patient came in and stated she needs a referral to [facility] orthopedic specialists  She has an appointment tomorrow

## 2023-04-25 ENCOUNTER — TELEPHONE (OUTPATIENT)
Dept: OBGYN CLINIC | Facility: HOSPITAL | Age: 38
End: 2023-04-25

## 2023-04-25 DIAGNOSIS — M79.641 CHRONIC HAND PAIN, RIGHT: Primary | ICD-10-CM

## 2023-04-25 DIAGNOSIS — G89.29 CHRONIC HAND PAIN, RIGHT: Primary | ICD-10-CM

## 2023-04-25 NOTE — TELEPHONE ENCOUNTER
Patient is being referred to a orthopedics  Please schedule accordingly      [facility] [address]   [phone]

## 2023-05-04 ENCOUNTER — TELEPHONE (OUTPATIENT)
Dept: FAMILY MEDICINE CLINIC | Facility: CLINIC | Age: 38
End: 2023-05-04

## 2023-05-04 ENCOUNTER — OFFICE VISIT (OUTPATIENT)
Dept: FAMILY MEDICINE CLINIC | Facility: CLINIC | Age: 38
End: 2023-05-04

## 2023-05-04 VITALS
OXYGEN SATURATION: 96 % | DIASTOLIC BLOOD PRESSURE: 80 MMHG | RESPIRATION RATE: 16 BRPM | HEART RATE: 113 BPM | TEMPERATURE: 97.6 F | HEIGHT: 63 IN | WEIGHT: 184 LBS | SYSTOLIC BLOOD PRESSURE: 110 MMHG | BODY MASS INDEX: 32.6 KG/M2

## 2023-05-04 DIAGNOSIS — J40 BRONCHITIS: Primary | ICD-10-CM

## 2023-05-04 DIAGNOSIS — J45.20 MILD INTERMITTENT ASTHMA WITHOUT COMPLICATION: ICD-10-CM

## 2023-05-04 RX ORDER — AZITHROMYCIN 250 MG/1
TABLET, FILM COATED ORAL
Qty: 6 TABLET | Refills: 0 | Status: SHIPPED | OUTPATIENT
Start: 2023-05-04 | End: 2023-05-09

## 2023-05-04 RX ORDER — FLUTICASONE PROPIONATE 50 MCG
1 SPRAY, SUSPENSION (ML) NASAL DAILY
Qty: 18.2 ML | Refills: 1 | Status: SHIPPED | OUTPATIENT
Start: 2023-05-04

## 2023-05-04 RX ORDER — BENZONATATE 100 MG/1
100 CAPSULE ORAL 3 TIMES DAILY PRN
Qty: 20 CAPSULE | Refills: 0 | Status: SHIPPED | OUTPATIENT
Start: 2023-05-04

## 2023-05-04 RX ORDER — ALBUTEROL SULFATE 2.5 MG/3ML
2.5 SOLUTION RESPIRATORY (INHALATION) EVERY 6 HOURS PRN
Qty: 473 ML | Refills: 1 | Status: SHIPPED | OUTPATIENT
Start: 2023-05-04 | End: 2023-08-02

## 2023-05-04 RX ORDER — PREDNISONE 20 MG/1
40 TABLET ORAL DAILY
Qty: 10 TABLET | Refills: 0 | Status: SHIPPED | OUTPATIENT
Start: 2023-05-04 | End: 2023-05-09

## 2023-05-04 NOTE — ASSESSMENT & PLAN NOTE
Cough for over a weeks with report or worsening symptoms and expectorating purulent mucus  Viral vs bacterial   Discussed rest, hydration, may take tylenol/NSAID  Start Flonase for congestion symptoms   Benzonatate 100 mg prn   Prednisone x5 days  zpack x5 days     Follow with PCP if symptoms do no improve

## 2023-05-04 NOTE — ASSESSMENT & PLAN NOTE
Patient requesting neb machine and solution currently using her daughters supplies  DME order placed on paracte

## 2023-05-04 NOTE — TELEPHONE ENCOUNTER
While on phone call patient states she has a cough for 5 days and green mucus phlegm  Please advise if patient needs to be seen in office  Please call her thank you!

## 2023-05-04 NOTE — PROGRESS NOTES
Name: Winfield Habermann      : 1985      MRN: [id number]  Encounter Provider: [address] [place] [address]  Encounter Date: 2023   Encounter department: [facility]    Assessment & Plan     1  Bronchitis  Assessment & Plan:  Cough for over a weeks with report or worsening symptoms and expectorating purulent mucus  Viral vs bacterial   Discussed rest, hydration, may take tylenol/NSAID  Start Flonase for congestion symptoms   Benzonatate 100 mg prn   Prednisone x5 days  zpack x5 days  Follow with PCP if symptoms do no improve     Orders:  -     predniSONE 20 mg tablet; Take 2 tablets (40 mg total) by mouth daily for 5 days  -     azithromycin (Zithromax) 250 mg tablet; Take 2 tablets (500 mg total) by mouth daily for 1 day, THEN 1 tablet (250 mg total) daily for 4 days  -     benzonatate (TESSALON PERLES) 100 mg capsule; Take 1 capsule (100 mg total) by mouth 3 (three) times a day as needed for cough  -     fluticasone (FLONASE) 50 mcg/act nasal spray; 1 spray into each nostril daily    2  Mild intermittent asthma without complication  Assessment & Plan:  Patient requesting neb machine and solution currently using her [employer] supplies  DME order placed on [employer]    Orders:  -     albuterol (2 5 mg/3 mL) 0 083 % nebulizer solution; Take 3 mL (2 5 mg total) by nebulization every 6 (six) hours as needed for wheezing or shortness of breath         Subjective      Marilee Ayala 34 y o  female has a past medical history of Asthma, GI bleed (2019), and Psychiatric disorder  Presenting today for same day visit for evaluation of a cough for a week, took unknown antibiotic today and felt better  Patient has been using [employer] neb machine and medication to help alleviate her symptoms     Denies fever, chill,  fatigue, headaches, dizziness, blurred vision, nausea, palpitation, chest pain, SOB, urinary changes, weakness, bowel changes, sleep problems,  sick contacts, red flag signs,  or recent travel   Overall patient reports feeling well   Patient has no further complaints other than what is mentioned in the ROS  Cough  This is a new problem  The current episode started in the past 7 days (5 days )  The problem has been waxing and waning  The cough is productive of sputum  Associated symptoms include nasal congestion and rhinorrhea  Pertinent negatives include no chest pain, chills, ear pain, fever, rash, sore throat or shortness of breath  Nothing aggravates the symptoms  The treatment provided no relief  Her past medical history is significant for asthma  Review of Systems   Constitutional: Negative for chills and fever  HENT: Positive for rhinorrhea  Negative for ear pain and sore throat  Eyes: Negative for pain and visual disturbance  Respiratory: Positive for cough  Negative for shortness of breath  Cardiovascular: Negative for chest pain and palpitations  Gastrointestinal: Negative for abdominal pain and vomiting  Genitourinary: Negative for dysuria and hematuria  Musculoskeletal: Negative for arthralgias and back pain  Skin: Negative for color change and rash  Neurological: Negative for seizures and syncope  All other systems reviewed and are negative        Current Outpatient Medications on File Prior to Visit   Medication Sig    albuterol (PROVENTIL HFA,VENTOLIN HFA) 90 mcg/act inhaler Inhale 2 puffs every 4 (four) hours as needed for wheezing (Patient not taking: Reported on 3/14/2023)    Diclofenac Sodium (VOLTAREN) 1 % Apply 2 g topically 4 (four) times a day (Patient not taking: Reported on 3/14/2023)    divalproex sodium (DEPAKOTE) 250 mg EC tablet Take 800 mg by mouth 2 (two) times a day    methocarbamol (ROBAXIN) 500 mg tablet Take 1 tablet (500 mg total) by mouth 2 (two) times a day (Patient not taking: Reported on 3/14/2023)    QUEtiapine (SEROquel) 50 mg tablet TAKE 1 TABLET BY MOUTH TWICE DAILY "FOR 7 DAYS THEN TAKE 1 TABLET BY MOUTH THREE TIMES DAILY       Objective     /80 (BP Location: Right arm, Patient Position: Sitting, Cuff Size: Standard)   Pulse (!) 113   Temp 97 6 °F (36 4 °C) (Temporal)   Resp 16   Ht 5' 3\" (1 6 m)   Wt 83 5 kg (184 lb)   LMP 04/15/2023   SpO2 96%   Breastfeeding No   BMI 32 59 kg/m²     Physical Exam  Vitals and nursing note reviewed  Constitutional:       General: She is not in acute distress  Appearance: Normal appearance  She is not ill-appearing  HENT:      Head: Normocephalic and atraumatic  Right Ear: Tympanic membrane, ear canal and external ear normal       Left Ear: Tympanic membrane, ear canal and external ear normal       Nose: Nose normal       Right Turbinates: Enlarged  Left Turbinates: Enlarged  Mouth/Throat:      Mouth: Mucous membranes are moist       Pharynx: No pharyngeal swelling  Eyes:      General:         Right eye: No discharge  Left eye: No discharge  Pupils: Pupils are equal, round, and reactive to light  Cardiovascular:      Rate and Rhythm: Normal rate and regular rhythm  Pulses: Normal pulses  Heart sounds: Normal heart sounds  Pulmonary:      Effort: Pulmonary effort is normal  No respiratory distress  Breath sounds: Transmitted upper airway sounds present  Wheezing (diffused) present  Abdominal:      General: Bowel sounds are normal       Palpations: Abdomen is soft  Tenderness: There is no abdominal tenderness  There is no right CVA tenderness or left CVA tenderness  Musculoskeletal:         General: Normal range of motion  Cervical back: Normal range of motion  Skin:     General: Skin is warm and dry  Neurological:      General: No focal deficit present  Mental Status: She is alert and oriented to person, place, and time         [address] [place] [address]"

## 2023-05-04 NOTE — TELEPHONE ENCOUNTER
Hello good afternoon, I called patient due to her insurance approved her MRI of the hand referral that was placed back in 1/17/2023  Patient states it was completed on 4/25/2023 as she attended an appointment with [facility] at 10:10 am  Informed patient that I am going to withdraw her case with insurance as she no longer needs it  Patient states that she has a  and  was the one who sent her to [place] [address] orthopedics related to work injury  Patient also states she has imaging of MRI of the hand was given in a disk  Is it needed for patient to bring into office to have it in her chart?      Patient has an appointment with [facility] 1:30 pm today at [address] [place] [address]   [phone]

## 2023-05-07 LAB
DME PARACHUTE DELIVERY DATE ACTUAL: NORMAL
DME PARACHUTE DELIVERY DATE REQUESTED: NORMAL
DME PARACHUTE ITEM DESCRIPTION: NORMAL
DME PARACHUTE ORDER STATUS: NORMAL
DME PARACHUTE SUPPLIER NAME: NORMAL
DME PARACHUTE SUPPLIER PHONE: NORMAL

## 2023-05-28 ENCOUNTER — HOSPITAL ENCOUNTER (EMERGENCY)
Facility: HOSPITAL | Age: 38
Discharge: HOME | End: 2023-05-28
Attending: EMERGENCY MEDICINE | Admitting: EMERGENCY MEDICINE
Payer: COMMERCIAL

## 2023-05-28 VITALS
TEMPERATURE: 98.2 F | SYSTOLIC BLOOD PRESSURE: 131 MMHG | BODY MASS INDEX: 31.96 KG/M2 | WEIGHT: 180.4 LBS | RESPIRATION RATE: 18 BRPM | HEART RATE: 101 BPM | DIASTOLIC BLOOD PRESSURE: 80 MMHG | OXYGEN SATURATION: 99 %

## 2023-05-28 DIAGNOSIS — H60.92 LEFT OTITIS EXTERNA: Primary | ICD-10-CM

## 2023-05-28 PROCEDURE — 99283 EMERGENCY DEPT VISIT LOW MDM: CPT

## 2023-05-28 RX ORDER — OFLOXACIN 3 MG/ML
5 SOLUTION AURICULAR (OTIC) 2 TIMES DAILY
Qty: 3.5 ML | Refills: 0 | Status: SHIPPED | OUTPATIENT
Start: 2023-05-28 | End: 2023-06-04

## 2023-05-29 NOTE — ED PROVIDER NOTES
"History  Chief Complaint   Patient presents with   • Earache     Pt came to ER with left ear pain for two days  Pt reports she just complete course of amoxicillin today  Pt denies drainage from ear  Patient is a 1year-old female presenting to the emergency department for evaluation of ear pain  Patient reports that she has had ear pain for approximately 1 week  Spoke with her sister, a \"pharmacist\" regarding her ear pain and her sister gave her 7 pills of 500 mg amoxicillin  Patient reports she took these once per day  Over the past couple days patient has had increasing left-sided ear discomfort  She denies any fevers, chills  She denies any ear drainage  She denies any loss of hearing or dizziness  Prior to Admission Medications   Prescriptions Last Dose Informant Patient Reported? Taking?    Diclofenac Sodium (VOLTAREN) 1 %   No No   Sig: Apply 2 g topically 4 (four) times a day   Patient not taking: Reported on 3/14/2023   QUEtiapine (SEROquel) 50 mg tablet   Yes No   Sig: TAKE 1 TABLET BY MOUTH TWICE DAILY FOR 7 DAYS THEN TAKE 1 TABLET BY MOUTH THREE TIMES DAILY   albuterol (2 5 mg/3 mL) 0 083 % nebulizer solution   No No   Sig: Take 3 mL (2 5 mg total) by nebulization every 6 (six) hours as needed for wheezing or shortness of breath   albuterol (PROVENTIL HFA,VENTOLIN HFA) 90 mcg/act inhaler   No No   Sig: Inhale 2 puffs every 4 (four) hours as needed for wheezing   Patient not taking: Reported on 3/14/2023   benzonatate (TESSALON PERLES) 100 mg capsule   No No   Sig: Take 1 capsule (100 mg total) by mouth 3 (three) times a day as needed for cough   divalproex sodium (DEPAKOTE) 250 mg EC tablet   Yes No   Sig: Take 800 mg by mouth 2 (two) times a day   fluticasone (FLONASE) 50 mcg/act nasal spray   No No   Si spray into each nostril daily   methocarbamol (ROBAXIN) 500 mg tablet   No No   Sig: Take 1 tablet (500 mg total) by mouth 2 (two) times a day   Patient not taking: Reported on " 3/14/2023      Facility-Administered Medications: None       Past Medical History:   Diagnosis Date   • Asthma    • GI bleed 2019    Added automatically from request for surgery [id number]   • Psychiatric disorder        Past Surgical History:   Procedure Laterality Date   •  SECTION     • NO PAST SURGERIES         History reviewed  No pertinent family history  I have reviewed and agree with the history as documented  E-Cigarette/Vaping   • E-Cigarette Use Never User      E-Cigarette/Vaping Substances   • Nicotine No    • THC No    • CBD No    • Flavoring No    • Other No    • Unknown No      Social History     Tobacco Use   • Smoking status: Every Day     Packs/day: 1 00     Types: Cigarettes   • Smokeless tobacco: Never   Vaping Use   • Vaping Use: Never used   Substance Use Topics   • Alcohol use: Not Currently     Comment: socially    • Drug use: Not Currently        Review of Systems   Constitutional: Negative  HENT: Positive for ear pain  Eyes: Negative  Respiratory: Negative  Cardiovascular: Negative  Gastrointestinal: Negative  Endocrine: Negative  Genitourinary: Negative  Musculoskeletal: Negative  Skin: Negative  Allergic/Immunologic: Negative  Neurological: Negative  Hematological: Negative  Psychiatric/Behavioral: Negative  All other systems reviewed and are negative  Physical Exam  ED Triage Vitals [23]   Temperature Pulse Respirations Blood Pressure SpO2   98 2 °F (36 8 °C) 101 18 131/80 99 %      Temp Source Heart Rate Source Patient Position - Orthostatic VS BP Location FiO2 (%)   Oral Monitor Sitting Left arm --      Pain Score       --             Orthostatic Vital Signs  Vitals:    23   BP: 131/80   Pulse: 101   Patient Position - Orthostatic VS: Sitting       Physical Exam  Vitals and nursing note reviewed  Constitutional:       General: She is not in acute distress  Appearance: Normal appearance   She is not ill-appearing, toxic-appearing or diaphoretic  HENT:      Head: Normocephalic and atraumatic  Right Ear: Tympanic membrane and ear canal normal       Left Ear: Tympanic membrane normal       Ears:      Comments: L ear canal erythema  Eyes:      General: No scleral icterus  Right eye: No discharge  Left eye: No discharge  Extraocular Movements: Extraocular movements intact  Conjunctiva/sclera: Conjunctivae normal       Pupils: Pupils are equal, round, and reactive to light  Cardiovascular:      Rate and Rhythm: Normal rate  Pulses: Normal pulses  Heart sounds: Normal heart sounds  No murmur heard  No friction rub  No gallop  Pulmonary:      Effort: Pulmonary effort is normal  No respiratory distress  Breath sounds: Normal breath sounds  No stridor  No wheezing, rhonchi or rales  Abdominal:      General: Abdomen is flat  Bowel sounds are normal  There is no distension  Palpations: Abdomen is soft  Tenderness: There is no abdominal tenderness  There is no guarding or rebound  Musculoskeletal:         General: No swelling  Normal range of motion  Cervical back: Normal range of motion  No rigidity  Right lower leg: No edema  Left lower leg: No edema  Skin:     General: Skin is warm and dry  Capillary Refill: Capillary refill takes less than 2 seconds  Coloration: Skin is not jaundiced  Findings: No bruising or lesion  Neurological:      General: No focal deficit present  Mental Status: She is alert and oriented to person, place, and time  Mental status is at baseline  Psychiatric:         Mood and Affect: Mood normal          Behavior: Behavior normal          Thought Content:  Thought content normal          Judgment: Judgment normal          ED Medications  Medications - No data to display    Diagnostic Studies  Results Reviewed     None                 No orders to display         Procedures  Procedures      ED Course                                       Medical Decision Making  34 female presenting to the emergency department for evaluation of ear pain  Physical and findings as noted above  TM normal bilaterally  External ear normal bilaterally  No focal mastoid tenderness  No intraoral swelling to suggest intraoral lesion  Suspect early otitis externa, will recommend ofloxacin drops  Patient verbalized understanding, patient discharged  Risk  Prescription drug management  Disposition  Final diagnoses:   Left otitis externa     Time reflects when diagnosis was documented in both MDM as applicable and the Disposition within this note     Time User Action Codes Description Comment    5/28/2023  8:45 PM Lorenza Boudreaux Add [H60 92] Left otitis externa       ED Disposition     ED Disposition   Discharge    Condition   Stable    Date/Time   Sun May 28, 2023  8:48 PM    Comment   Juan Andujar discharge to home/self care                 Follow-up Information     Follow up With Specialties Details Why Contact Info    GIORGIO Mohan Nurse Practitioner, Family Medicine In 2 days  [place]  [phone]            Discharge Medication List as of 5/28/2023  8:49 PM      START taking these medications    Details   ofloxacin (FLOXIN) 0 3 % otic solution Administer 5 drops into ears 2 (two) times a day for 7 days, Starting Sun 5/28/2023, Until Sun 6/4/2023, Normal         CONTINUE these medications which have NOT CHANGED    Details   albuterol (2 5 mg/3 mL) 0 083 % nebulizer solution Take 3 mL (2 5 mg total) by nebulization every 6 (six) hours as needed for wheezing or shortness of breath, Starting Thu 5/4/2023, Until Wed 8/2/2023 at 2359, Normal      albuterol (PROVENTIL HFA,VENTOLIN HFA) 90 mcg/act inhaler Inhale 2 puffs every 4 (four) hours as needed for wheezing, Starting Tue 1/17/2023, Normal      benzonatate (TESSALON PERLES) 100 mg capsule Take 1 capsule (100 mg total) by mouth 3 (three) times a day as needed for cough, Starting Thu 5/4/2023, Normal      Diclofenac Sodium (VOLTAREN) 1 % Apply 2 g topically 4 (four) times a day, Starting Tue 1/17/2023, Normal      divalproex sodium (DEPAKOTE) 250 mg EC tablet Take 800 mg by mouth 2 (two) times a day, Historical Med      fluticasone (FLONASE) 50 mcg/act nasal spray 1 spray into each nostril daily, Starting Thu 5/4/2023, Normal      methocarbamol (ROBAXIN) 500 mg tablet Take 1 tablet (500 mg total) by mouth 2 (two) times a day, Starting Fri 9/2/2022, Normal      QUEtiapine (SEROquel) 50 mg tablet TAKE 1 TABLET BY MOUTH TWICE DAILY FOR 7 DAYS THEN TAKE 1 TABLET BY MOUTH THREE TIMES DAILY, Historical Med           No discharge procedures on file  PDMP Review     None           ED Provider  Attending physically available and evaluated [facility] managed the patient along with the ED Attending      Electronically Signed by         Tamica Slaughter DO  05/28/23 0214

## 2023-05-30 NOTE — ED ATTENDING ATTESTATION
5/28/2023  IDelicia MD, saw and evaluated the patient  I have discussed the patient with the resident/non-physician practitioner and agree with the resident's/non-physician practitioner's findings, Plan of Care, and MDM as documented in the resident's/non-physician practitioner's note, except where noted  All available labs and Radiology studies were reviewed  I was present for key portions of any procedure(s) performed by the resident/non-physician practitioner and I was immediately available to provide assistance  At this point I agree with the current assessment done in the Emergency Department  I have conducted an independent evaluation of this patient a history and physical is as follows:    35 yo F who presents with 1 week of left ear pain, constant sharp non radiating  Has been taking amoxil from a family member with no relief  No change in hearing  No f/s/c  vss  NCAT  Left canal erythematous, pain with otoscopy TM wnl  rrr  cta bl  Neuro intact          ED Course     Plan as discussed with resident       Critical Care Time  Procedures

## 2023-06-14 DIAGNOSIS — J45.20 MILD INTERMITTENT ASTHMA WITHOUT COMPLICATION: ICD-10-CM

## 2023-06-15 RX ORDER — ALBUTEROL SULFATE 90 UG/1
2 AEROSOL, METERED RESPIRATORY (INHALATION) EVERY 4 HOURS PRN
Qty: 18 G | Refills: 0 | Status: SHIPPED | OUTPATIENT
Start: 2023-06-15

## 2023-06-15 RX ORDER — ALBUTEROL SULFATE 2.5 MG/3ML
2.5 SOLUTION RESPIRATORY (INHALATION) EVERY 6 HOURS PRN
Qty: 473 ML | Refills: 0 | Status: SHIPPED | OUTPATIENT
Start: 2023-06-15 | End: 2023-09-13

## 2023-07-25 ENCOUNTER — OFFICE VISIT (OUTPATIENT)
Dept: FAMILY MEDICINE CLINIC | Facility: CLINIC | Age: 38
End: 2023-07-25

## 2023-07-25 VITALS
OXYGEN SATURATION: 97 % | WEIGHT: 182 LBS | HEIGHT: 63 IN | DIASTOLIC BLOOD PRESSURE: 78 MMHG | SYSTOLIC BLOOD PRESSURE: 112 MMHG | TEMPERATURE: 97 F | RESPIRATION RATE: 16 BRPM | BODY MASS INDEX: 32.25 KG/M2 | HEART RATE: 97 BPM

## 2023-07-25 DIAGNOSIS — H60.392 OTHER INFECTIVE ACUTE OTITIS EXTERNA OF LEFT EAR: ICD-10-CM

## 2023-07-25 DIAGNOSIS — J40 BRONCHITIS: ICD-10-CM

## 2023-07-25 DIAGNOSIS — K04.7 DENTAL INFECTION: Primary | ICD-10-CM

## 2023-07-25 DIAGNOSIS — K04.7 DENTAL INFECTION: ICD-10-CM

## 2023-07-25 DIAGNOSIS — R10.9 RIGHT FLANK PAIN: ICD-10-CM

## 2023-07-25 DIAGNOSIS — M79.641 CHRONIC HAND PAIN, RIGHT: ICD-10-CM

## 2023-07-25 DIAGNOSIS — J45.20 MILD INTERMITTENT ASTHMA WITHOUT COMPLICATION: ICD-10-CM

## 2023-07-25 DIAGNOSIS — G89.29 CHRONIC HAND PAIN, RIGHT: ICD-10-CM

## 2023-07-25 PROBLEM — H60.90 OTITIS EXTERNA: Status: ACTIVE | Noted: 2023-07-25

## 2023-07-25 PROCEDURE — 99214 OFFICE O/P EST MOD 30 MIN: CPT | Performed by: FAMILY MEDICINE

## 2023-07-25 RX ORDER — AMOXICILLIN 500 MG/1
500 TABLET, FILM COATED ORAL 2 TIMES DAILY
Qty: 14 TABLET | Refills: 0 | Status: SHIPPED | OUTPATIENT
Start: 2023-07-25 | End: 2023-08-01

## 2023-07-25 RX ORDER — CIPROFLOXACIN AND DEXAMETHASONE 3; 1 MG/ML; MG/ML
4 SUSPENSION/ DROPS AURICULAR (OTIC) 2 TIMES DAILY
Qty: 7.5 ML | Refills: 0 | Status: SHIPPED | OUTPATIENT
Start: 2023-07-25 | End: 2023-07-25

## 2023-07-25 RX ORDER — NAPROXEN 500 MG/1
500 TABLET ORAL 2 TIMES DAILY WITH MEALS
Qty: 30 TABLET | Refills: 1 | Status: SHIPPED | OUTPATIENT
Start: 2023-07-25

## 2023-07-25 NOTE — PROGRESS NOTES
Name: Nuria Thomas      : 1985      MRN: [id number]  Encounter Provider: Irma Almonte  Encounter Date: 2023   Encounter department: [address] [facility] [facility]     1. Dental infection  Assessment & Plan: We will provide amoxicillin for dental infection  Recommend outpatient follow-up with dentistry  Dental hygiene counseling provided  Naproxen 500 mg twice daily for 7 to 10 days and as needed afterwards    Orders:  -     naproxen (Naprosyn) 500 mg tablet; Take 1 tablet (500 mg total) by mouth 2 (two) times a day with meals  -     amoxicillin (AMOXIL) 500 MG tablet; Take 1 tablet (500 mg total) by mouth 2 (two) times a day for 7 days    2. Other infective acute otitis externa of left ear  Assessment & Plan:  -Topical antibiotics drops provided  -Avoid water exposure to your ears during treatment      Orders:  -     neomycin-polymyxin-hydrocortisone (CORTISPORIN) otic solution; Administer 4 drops into the left ear every 6 (six) hours for 7 days         Subjective      78-year-old female with a past medical history of asthma who presents today for left ear pain and dental pain    A few day onset of Left ear pain   Recently daignosed with otitis externa a few weeks ago and treated with topical antibiotics  She denies any ear trauma or any insertion of foreign body into her ears  She denies going swimming recently  She denies fever, chills, nausea, vomiting, headache, chest pain, shortness of breath  She also reports dental pain-left molar for the past few days as well  She in the process of scheduling an appointment with dentistry    Review of Systems   Constitutional: Negative for appetite change, chills, diaphoresis, fatigue and fever. HENT: Positive for dental problem and ear pain. Negative for ear discharge, facial swelling, hearing loss, tinnitus and trouble swallowing.     Respiratory: Negative for shortness of breath and wheezing. Cardiovascular: Negative for chest pain, palpitations and leg swelling. Gastrointestinal: Negative for abdominal pain, nausea and vomiting. Musculoskeletal: Negative for arthralgias. Skin: Negative for rash. Neurological: Negative for dizziness, seizures, syncope, weakness, numbness and headaches. Current Outpatient Medications on File Prior to Visit   Medication Sig   • albuterol (2.5 mg/3 mL) 0.083 % nebulizer solution Take 3 mL (2.5 mg total) by nebulization every 6 (six) hours as needed for wheezing or shortness of breath   • albuterol (PROVENTIL HFA,VENTOLIN HFA) 90 mcg/act inhaler Inhale 2 puffs every 4 (four) hours as needed for wheezing   • benzonatate (TESSALON PERLES) 100 mg capsule Take 1 capsule (100 mg total) by mouth 3 (three) times a day as needed for cough   • Diclofenac Sodium (VOLTAREN) 1 % Apply 2 g topically 4 (four) times a day (Patient not taking: Reported on 3/14/2023)   • divalproex sodium (DEPAKOTE) 250 mg EC tablet Take 800 mg by mouth 2 (two) times a day   • fluticasone (FLONASE) 50 mcg/act nasal spray 1 spray into each nostril daily   • methocarbamol (ROBAXIN) 500 mg tablet Take 1 tablet (500 mg total) by mouth 2 (two) times a day (Patient not taking: Reported on 3/14/2023)   • QUEtiapine (SEROquel) 50 mg tablet TAKE 1 TABLET BY MOUTH TWICE DAILY FOR 7 DAYS THEN TAKE 1 TABLET BY MOUTH THREE TIMES DAILY       Objective     /78 (BP Location: Left arm, Patient Position: Sitting, Cuff Size: Standard)   Pulse 97   Temp (!) 97 °F (36.1 °C) (Temporal)   Resp 16   Ht 5' 3" (1.6 m)   Wt 82.6 kg (182 lb)   SpO2 97%   BMI 32.24 kg/m²     Physical Exam  Constitutional:       General: She is not in acute distress. Appearance: Normal appearance. She is well-developed. She is not ill-appearing, toxic-appearing or diaphoretic. HENT:      Head: Normocephalic. Right Ear: External ear normal. No decreased hearing noted.       Left Ear: External ear normal. No decreased hearing noted. Ears:      Comments: Left ear erythematous external auditory canal as well as  Tenderness on palpation of external ear  No mastoid tenderness     Nose: Nose normal.      Mouth/Throat:      Dentition: Abnormal dentition. Dental tenderness, gingival swelling, dental caries and gum lesions present. Pharynx: Uvula midline. Tonsils: No tonsillar exudate or tonsillar abscesses. Eyes:      Pupils: Pupils are equal, round, and reactive to light. Neck:      Thyroid: No thyromegaly. Vascular: No JVD. Trachea: No tracheal deviation. Cardiovascular:      Rate and Rhythm: Normal rate and regular rhythm. Heart sounds: Normal heart sounds. No murmur heard. No friction rub. No gallop. Pulmonary:      Effort: Pulmonary effort is normal. No respiratory distress. Breath sounds: Normal breath sounds. No stridor. No wheezing. Abdominal:      General: Bowel sounds are normal. There is no distension. Palpations: Abdomen is soft. There is no mass. Tenderness: There is no abdominal tenderness. There is no guarding. Musculoskeletal:         General: Normal range of motion. Cervical back: Normal range of motion. Skin:     General: Skin is warm. Capillary Refill: Capillary refill takes less than 2 seconds. Findings: No rash. Neurological:      Mental Status: She is alert and oriented to person, place, and time. Cranial Nerves: No cranial nerve deficit. Motor: No abnormal muscle tone.       Coordination: Coordination normal.      Deep Tendon Reflexes: Reflexes normal.   Psychiatric:         Behavior: Behavior normal.       [address]

## 2023-07-25 NOTE — ASSESSMENT & PLAN NOTE
We will provide amoxicillin for dental infection  Recommend outpatient follow-up with dentistry  Dental hygiene counseling provided  Naproxen 500 mg twice daily for 7 to 10 days and as needed afterwards

## 2023-07-26 RX ORDER — NAPROXEN 500 MG/1
500 TABLET ORAL 2 TIMES DAILY WITH MEALS
Qty: 30 TABLET | Refills: 0 | OUTPATIENT
Start: 2023-07-26

## 2023-07-27 RX ORDER — METHOCARBAMOL 500 MG/1
500 TABLET, FILM COATED ORAL 2 TIMES DAILY
Qty: 20 TABLET | Refills: 0 | OUTPATIENT
Start: 2023-07-27

## 2023-07-27 RX ORDER — FLUTICASONE PROPIONATE 50 MCG
1 SPRAY, SUSPENSION (ML) NASAL DAILY
Qty: 18.2 ML | Refills: 0 | OUTPATIENT
Start: 2023-07-27

## 2023-07-27 RX ORDER — BENZONATATE 100 MG/1
100 CAPSULE ORAL 3 TIMES DAILY PRN
Qty: 20 CAPSULE | Refills: 0 | OUTPATIENT
Start: 2023-07-27

## 2023-07-27 RX ORDER — ALBUTEROL SULFATE 2.5 MG/3ML
2.5 SOLUTION RESPIRATORY (INHALATION) EVERY 6 HOURS PRN
Qty: 473 ML | Refills: 0 | OUTPATIENT
Start: 2023-07-27 | End: 2023-10-25

## 2023-07-27 RX ORDER — DIVALPROEX SODIUM 250 MG/1
800 TABLET, DELAYED RELEASE ORAL 2 TIMES DAILY
Refills: 0 | OUTPATIENT
Start: 2023-07-27

## 2023-07-27 RX ORDER — QUETIAPINE FUMARATE 50 MG/1
TABLET, FILM COATED ORAL
Refills: 0 | OUTPATIENT
Start: 2023-07-27

## 2023-07-27 RX ORDER — AMOXICILLIN 500 MG/1
500 TABLET, FILM COATED ORAL 2 TIMES DAILY
Qty: 14 TABLET | Refills: 0 | OUTPATIENT
Start: 2023-07-27 | End: 2023-08-03

## 2023-07-27 RX ORDER — ALBUTEROL SULFATE 90 UG/1
2 AEROSOL, METERED RESPIRATORY (INHALATION) EVERY 4 HOURS PRN
Qty: 18 G | Refills: 0 | OUTPATIENT
Start: 2023-07-27

## 2023-08-07 ENCOUNTER — HOSPITAL ENCOUNTER (EMERGENCY)
Facility: HOSPITAL | Age: 38
Discharge: HOME | End: 2023-08-07
Attending: EMERGENCY MEDICINE
Payer: COMMERCIAL

## 2023-08-07 VITALS
TEMPERATURE: 98.1 F | SYSTOLIC BLOOD PRESSURE: 115 MMHG | WEIGHT: 182.1 LBS | DIASTOLIC BLOOD PRESSURE: 66 MMHG | BODY MASS INDEX: 32.26 KG/M2 | RESPIRATION RATE: 20 BRPM | HEART RATE: 86 BPM | OXYGEN SATURATION: 97 %

## 2023-08-07 DIAGNOSIS — H92.02 CHRONIC EAR PAIN, LEFT: Primary | ICD-10-CM

## 2023-08-07 DIAGNOSIS — G89.29 CHRONIC EAR PAIN, LEFT: Primary | ICD-10-CM

## 2023-08-07 DIAGNOSIS — H66.90 ACUTE OTITIS MEDIA: ICD-10-CM

## 2023-08-07 PROCEDURE — 99283 EMERGENCY DEPT VISIT LOW MDM: CPT

## 2023-08-07 PROCEDURE — 99284 EMERGENCY DEPT VISIT MOD MDM: CPT

## 2023-08-07 RX ORDER — CLINDAMYCIN HYDROCHLORIDE 150 MG/1
450 CAPSULE ORAL 3 TIMES DAILY
Qty: 45 CAPSULE | Refills: 0 | Status: SHIPPED | OUTPATIENT
Start: 2023-08-07 | End: 2023-08-12

## 2023-08-07 RX ORDER — NAPROXEN 500 MG/1
500 TABLET ORAL 2 TIMES DAILY WITH MEALS
Qty: 30 TABLET | Refills: 0 | Status: SHIPPED | OUTPATIENT
Start: 2023-08-07 | End: 2023-08-09 | Stop reason: SDUPTHER

## 2023-08-07 RX ORDER — NAPROXEN 500 MG/1
500 TABLET ORAL ONCE
Status: COMPLETED | OUTPATIENT
Start: 2023-08-07 | End: 2023-08-07

## 2023-08-07 RX ORDER — GABAPENTIN 300 MG/1
CAPSULE ORAL
COMMUNITY
Start: 2023-05-31

## 2023-08-07 RX ORDER — CLINDAMYCIN HYDROCHLORIDE 150 MG/1
450 CAPSULE ORAL ONCE
Status: COMPLETED | OUTPATIENT
Start: 2023-08-07 | End: 2023-08-07

## 2023-08-07 RX ADMIN — CLINDAMYCIN HYDROCHLORIDE 450 MG: 150 CAPSULE ORAL at 20:16

## 2023-08-07 RX ADMIN — NAPROXEN 500 MG: 500 TABLET ORAL at 20:16

## 2023-08-08 NOTE — ED ATTENDING ATTESTATION
I was the attending physician on duty at the time the patient visited the emergency department. The patient was evaluated by the Advanced Practitioner. I was personally available for consultation; however the patient’s care, medical decisions and disposition fell within the Advanced Practitioner’s scope of practice to independently manage the patient, unless otherwise noted.     Carlos Blum MD

## 2023-08-08 NOTE — ED PROVIDER NOTES
History  Chief Complaint   Patient presents with   • Earache     Pt reports left ear pain and left lower jaw pain for the past 6 days. Was seen here for the same, came back because the "pills are not working."   • Dental Pain     34year old female presenting today with concerns of worsening right ear pain. Patient has been taking ear drops with mild relief of the pain, however, it has been getting worse. States she was given amoxicillin without relief. Has been ongoing for the last 2 months. States that she feels like there is something stuck in her ear. Mild hearing loss. No fevers, chills, chest pain, shortness of breath. Also has a painful tooth, however, does have a dentist appointment for extraction. No nausea, vomiting, discharge from the ear. Has been taking tylenol without relief. Prior to Admission Medications   Prescriptions Last Dose Informant Patient Reported? Taking?    Diclofenac Sodium (VOLTAREN) 1 %   No No   Sig: Apply 2 g topically 4 (four) times a day   Patient not taking: Reported on 3/14/2023   QUEtiapine (SEROquel) 50 mg tablet   Yes No   Sig: TAKE 1 TABLET BY MOUTH TWICE DAILY FOR 7 DAYS THEN TAKE 1 TABLET BY MOUTH THREE TIMES DAILY   albuterol (2.5 mg/3 mL) 0.083 % nebulizer solution   No No   Sig: Take 3 mL (2.5 mg total) by nebulization every 6 (six) hours as needed for wheezing or shortness of breath   albuterol (PROVENTIL HFA,VENTOLIN HFA) 90 mcg/act inhaler   No No   Sig: Inhale 2 puffs every 4 (four) hours as needed for wheezing   divalproex sodium (DEPAKOTE) 250 mg EC tablet   Yes No   Sig: Take 800 mg by mouth 2 (two) times a day   fluticasone (FLONASE) 50 mcg/act nasal spray   No No   Si spray into each nostril daily   gabapentin (NEURONTIN) 300 mg capsule   Yes No      Facility-Administered Medications: None       Past Medical History:   Diagnosis Date   • Asthma    • GI bleed 2019    Added automatically from request for surgery [id number]   • Psychiatric disorder Past Surgical History:   Procedure Laterality Date   •  SECTION     • NO PAST SURGERIES         History reviewed. No pertinent family history. I have reviewed and agree with the history as documented. E-Cigarette/Vaping   • E-Cigarette Use Never User      E-Cigarette/Vaping Substances   • Nicotine No    • THC No    • CBD No    • Flavoring No    • Other No    • Unknown No      Social History     Tobacco Use   • Smoking status: Every Day     Packs/day: 1.00     Types: Cigarettes   • Smokeless tobacco: Never   Vaping Use   • Vaping Use: Never used   Substance Use Topics   • Alcohol use: Not Currently     Comment: socially    • Drug use: Not Currently       Review of Systems   Constitutional: Negative for chills and fever. HENT: Positive for dental problem and ear pain. Negative for congestion, drooling, ear discharge, facial swelling, mouth sores, nosebleeds, postnasal drip, rhinorrhea and sore throat. Eyes: Negative for pain and visual disturbance. Respiratory: Negative for cough and shortness of breath. Cardiovascular: Negative for chest pain and palpitations. Gastrointestinal: Negative for abdominal pain, constipation, diarrhea, nausea and vomiting. Genitourinary: Negative for dysuria and hematuria. Musculoskeletal: Negative for arthralgias and back pain. Skin: Negative for color change and rash. Neurological: Negative for dizziness, seizures, syncope, weakness, light-headedness and headaches. All other systems reviewed and are negative. Physical Exam  Physical Exam  Vitals and nursing note reviewed. Constitutional:       General: She is not in acute distress. Appearance: She is well-developed. She is not ill-appearing. HENT:      Head: Normocephalic and atraumatic. Right Ear: Hearing, tympanic membrane, ear canal and external ear normal.      Left Ear: Decreased hearing noted. No laceration, swelling or tenderness. There is no impacted cerumen.  No mastoid tenderness. No PE tube. No hemotympanum. Tympanic membrane is perforated and erythematous. Tympanic membrane is not injected, scarred, retracted or bulging. Tympanic membrane has normal mobility. Eyes:      Conjunctiva/sclera: Conjunctivae normal.   Cardiovascular:      Rate and Rhythm: Normal rate and regular rhythm. Heart sounds: No murmur heard. Pulmonary:      Effort: Pulmonary effort is normal. No respiratory distress. Breath sounds: Normal breath sounds. Abdominal:      Palpations: Abdomen is soft. Tenderness: There is no abdominal tenderness. Musculoskeletal:         General: No swelling or tenderness. Cervical back: Neck supple. Right lower leg: No edema. Left lower leg: No edema. Skin:     General: Skin is warm and dry. Capillary Refill: Capillary refill takes less than 2 seconds. Coloration: Skin is not jaundiced or pale. Findings: No bruising, erythema, lesion or rash. Neurological:      Mental Status: She is alert.    Psychiatric:         Mood and Affect: Mood normal.         Vital Signs  ED Triage Vitals   Temperature Pulse Respirations Blood Pressure SpO2   08/07/23 1856 08/07/23 1855 08/07/23 1855 08/07/23 1855 08/07/23 1855   98.1 °F (36.7 °C) 86 20 115/66 97 %      Temp Source Heart Rate Source Patient Position - Orthostatic VS BP Location FiO2 (%)   08/07/23 1856 08/07/23 1855 08/07/23 1855 08/07/23 1855 --   Oral Monitor Sitting Right arm       Pain Score       --                  Vitals:    08/07/23 1855   BP: 115/66   Pulse: 86   Patient Position - Orthostatic VS: Sitting         Visual Acuity      ED Medications  Medications   naproxen (NAPROSYN) tablet 500 mg (500 mg Oral Given 8/7/23 2016)   clindamycin (CLEOCIN) capsule 450 mg (450 mg Oral Given 8/7/23 2016)       Diagnostic Studies  Results Reviewed     None                 No orders to display              Procedures  Procedures         ED Course                               SBIRT 22yo+    Flowsheet Row Most Recent Value   Initial Alcohol Screen: US AUDIT-C     1. How often do you have a drink containing alcohol? 0 Filed at: 08/07/2023 1859   2. How many drinks containing alcohol do you have on a typical day you are drinking? 0 Filed at: 08/07/2023 1859   3a. Male UNDER 65: How often do you have five or more drinks on one occasion? 0 Filed at: 08/07/2023 1859   3b. FEMALE Any Age, or MALE 65+: How often do you have 4 or more drinks on one occassion? 0 Filed at: 08/07/2023 1859   Audit-C Score 0 Filed at: 08/07/2023 1859   CORINNE: How many times in the past year have you. .. Used an illegal drug or used a prescription medication for non-medical reasons? Never Filed at: 08/07/2023 1859                    Medical Decision Making  34year old female presenting today with concerns of left ear pain, worsening. Has been trying topical drops without relief. On exam, perforated TM. No foreign bodies, no drainage. No mastoid tenderness. No fevers or chills. Will refer to ENT. Change abx to clindamycin. Naproxen BID for pain. Patient agreeable to plan. Referral sent, meds sent. Patient at time of discharge was well appearing and in no acute  Distress, all questions answered. Patient's vitals, lab/imaging results, diagnosis, and treatment plan were discussed with the patient. All new/changed medications were discussed with patient, specifically, route of administration, how often and when to take, and where they can be picked up. Strict return precautions as well as close follow up with PCP was discussed with the patient and the patient was agreeable to my recommendations. Patient verbally acknowledged understanding of the above communications. All labs reviewed and utilized in the medical decision making process (if labs were ordered).  Portions of the record may have been created with voice recognition software.  Occasional wrong word or "sound a like" substitutions may have occurred due to the inherent limitations of voice recognition software.  Read the chart carefully and recognize, using context, where substitutions have occurred. Risk  Prescription drug management. Disposition  Final diagnoses:   Chronic ear pain, left   Acute otitis media     Time reflects when diagnosis was documented in both MDM as applicable and the Disposition within this note     Time User Action Codes Description Comment    8/7/2023  8:03 PM Sonu Embs Add [H92.02,  G89.29] Chronic ear pain, left     8/7/2023  8:03 PM Sonu Embs Add [H66.90] Acute otitis media       ED Disposition     ED Disposition   Discharge    Condition   Stable    Date/Time   Mon Aug 7, 2023  8:04 PM    Comment   Wisam Purvis discharge to home/self care.                Follow-up Information     Follow up With Specialties Details Why Contact Info Additional [address] Emergency Department Emergency Medicine Go to  If symptoms worsen [address] [phone] [address] [facility] Emergency Department    [facility] [address] [facility] ENT Otolaryngology Schedule an appointment as soon as possible for a visit   [address] [place] [phone]  [address] [place] [address] ENT, [address] [place] [address], [phone]          Discharge Medication List as of 8/7/2023  8:12 PM      START taking these medications    Details   clindamycin (CLEOCIN) 150 mg capsule Take 3 capsules (450 mg total) by mouth 3 (three) times a day for 5 days, Starting Mon 8/7/2023, Until Sat 8/12/2023, Normal      naproxen (Naprosyn) 500 mg tablet Take 1 tablet (500 mg total) by mouth 2 (two) times a day with meals, Starting Mon 8/7/2023, Normal         CONTINUE these medications which have NOT CHANGED    Details   albuterol (2.5 mg/3 mL) 0.083 % nebulizer solution Take 3 mL (2.5 mg total) by nebulization every 6 (six) hours as needed for wheezing or shortness of breath, Starting Thu 6/15/2023, Until Wed 9/13/2023 at 2359, Normal      albuterol (PROVENTIL HFA,VENTOLIN HFA) 90 mcg/act inhaler Inhale 2 puffs every 4 (four) hours as needed for wheezing, Starting Thu 6/15/2023, Normal      Diclofenac Sodium (VOLTAREN) 1 % Apply 2 g topically 4 (four) times a day, Starting Tue 1/17/2023, Normal      divalproex sodium (DEPAKOTE) 250 mg EC tablet Take 800 mg by mouth 2 (two) times a day, Historical Med      fluticasone (FLONASE) 50 mcg/act nasal spray 1 spray into each nostril daily, Starting Thu 5/4/2023, Normal      gabapentin (NEURONTIN) 300 mg capsule Starting Wed 5/31/2023, Historical Med      QUEtiapine (SEROquel) 50 mg tablet TAKE 1 TABLET BY MOUTH TWICE DAILY FOR 7 DAYS THEN TAKE 1 TABLET BY MOUTH THREE TIMES DAILY, Historical Med                 PDMP Review     None          ED Provider  Electronically Signed by           Kiara Bhakta PA-C  08/07/23 2020

## 2023-08-08 NOTE — PROGRESS NOTES
[facility] PRACTICE DEANA    NAME: Miky Petersen  AGE: 34 y.o. SEX: female  : 1985     DATE: 2023     Assessment and Plan:     Problem List Items Addressed This Visit    None      Immunizations and preventive care screenings were discussed with patient today. Appropriate education was printed on patient's after visit summary. Counseling:  Alcohol/drug use: discussed moderation in alcohol intake, the recommendations for healthy alcohol use, and avoidance of illicit drug use. Dental Health: discussed importance of regular tooth brushing, flossing, and dental visits. Injury prevention: discussed safety/seat belts, safety helmets, smoke detectors, carbon dioxide detectors, and smoking near bedding or upholstery. Sexual health: discussed sexually transmitted diseases, partner selection, use of condoms, avoidance of unintended pregnancy, and contraceptive alternatives. · Exercise: the importance of regular exercise/physical activity was discussed. Recommend exercise 3-5 times per week for at least 30 minutes. BMI Counseling: There is no height or weight on file to calculate BMI. The BMI is above normal. Nutrition recommendations include decreasing portion sizes, encouraging healthy choices of fruits and vegetables, decreasing fast food intake, consuming healthier snacks, limiting drinks that contain sugar, moderation in carbohydrate intake, increasing intake of lean protein, reducing intake of saturated and trans fat and reducing intake of cholesterol. Exercise recommendations include moderate physical activity 150 minutes/week. No pharmacotherapy was ordered. Rationale for BMI follow-up plan is due to patient being overweight or obese. No follow-ups on file. Chief Complaint:     No chief complaint on file.      History of Present Illness:     Adult Annual Physical   Patient here for a comprehensive physical exam. The patient reports {problems:03458}. Diet and Physical Activity  · Diet/Nutrition: {annual physical; diet:}. · Exercise: {annual physical; exercise:67180158}. Depression Screening  PHQ-2/9 Depression Screening         General Health  · Sleep: {annual physical; sleep:2102}. · Hearing: {annual physical; hearin}. · Vision: {annual physical; vision:}. · Dental: {annual physical; dental:}. /GYN Health  · Patient is: {Menopause:74033}  · Last menstrual period: ***  · Contraceptive method: {contraceptive options:}. Review of Systems:     Review of Systems   Constitutional: Negative for chills and fever. HENT: Negative for ear pain and sore throat. Eyes: Negative for pain and visual disturbance. Respiratory: Negative for cough and shortness of breath. Cardiovascular: Negative for chest pain and palpitations. Gastrointestinal: Negative for abdominal pain and vomiting. Genitourinary: Negative for dysuria and hematuria. Musculoskeletal: Negative for arthralgias and back pain. Skin: Negative for color change and rash. Neurological: Negative for seizures and syncope. All other systems reviewed and are negative.      Past Medical History:     Past Medical History:   Diagnosis Date   • Asthma    • GI bleed 2019    Added automatically from request for surgery [id number]   • Psychiatric disorder       Past Surgical History:     Past Surgical History:   Procedure Laterality Date   •  SECTION     • NO PAST SURGERIES        Social History:     Social History     Socioeconomic History   • Marital status: Unknown     Spouse name: Not on file   • Number of children: Not on file   • Years of education: Not on file   • Highest education level: Not on file   Occupational History   • Not on file   Tobacco Use   • Smoking status: Every Day     Packs/day: 1.00     Types: Cigarettes   • Smokeless tobacco: Never   Vaping Use   • Vaping Use: Never used   Substance and Sexual Activity   • Alcohol use: Not Currently     Comment: socially    • Drug use: Not Currently   • Sexual activity: Not on file   Other Topics Concern   • Not on file   Social History Narrative    ** Merged History Encounter **          Social Determinants of Health     Financial Resource Strain: Low Risk  (1/10/2023)    Overall Financial Resource Strain (CARDIA)    • Difficulty of Paying Living Expenses: Not hard at all   Food Insecurity: No Food Insecurity (1/10/2023)    Hunger Vital Sign    • Worried About Running Out of Food in the Last Year: Never true    • Ran Out of Food in the Last Year: Never true   Transportation Needs: No Transportation Needs (1/10/2023)    PRAPARE - Transportation    • Lack of Transportation (Medical): No    • Lack of Transportation (Non-Medical): No   Physical Activity: Not on file   Stress: Not on file   Social Connections: Not on file   Intimate Partner Violence: Not on file   Housing Stability: Not on file      Family History:     No family history on file.    Current Medications:     Current Outpatient Medications   Medication Sig Dispense Refill   • albuterol (2.5 mg/3 mL) 0.083 % nebulizer solution Take 3 mL (2.5 mg total) by nebulization every 6 (six) hours as needed for wheezing or shortness of breath 473 mL 0   • albuterol (PROVENTIL HFA,VENTOLIN HFA) 90 mcg/act inhaler Inhale 2 puffs every 4 (four) hours as needed for wheezing 18 g 0   • clindamycin (CLEOCIN) 150 mg capsule Take 3 capsules (450 mg total) by mouth 3 (three) times a day for 5 days 45 capsule 0   • Diclofenac Sodium (VOLTAREN) 1 % Apply 2 g topically 4 (four) times a day (Patient not taking: Reported on 3/14/2023) 250 g 0   • divalproex sodium (DEPAKOTE) 250 mg EC tablet Take 800 mg by mouth 2 (two) times a day     • fluticasone (FLONASE) 50 mcg/act nasal spray 1 spray into each nostril daily 18.2 mL 1   • gabapentin (NEURONTIN) 300 mg capsule      • naproxen (Naprosyn) 500 mg tablet Take 1 tablet (500 mg total) by mouth 2 (two) times a day with meals 30 tablet 0   • QUEtiapine (SEROquel) 50 mg tablet TAKE 1 TABLET BY MOUTH TWICE DAILY FOR 7 DAYS THEN TAKE 1 TABLET BY MOUTH THREE TIMES DAILY       No current facility-administered medications for this visit. Allergies:     No Known Allergies   Physical Exam:     [facility] 07/24/2023 (Approximate)     Physical Exam  Vitals and nursing note reviewed. Constitutional:       General: She is not in acute distress. Appearance: Normal appearance. She is well-developed. She is obese. HENT:      Head: Normocephalic and atraumatic. Right Ear: External ear normal.      Left Ear: External ear normal.      Nose: Nose normal.      Mouth/Throat:      Mouth: Mucous membranes are moist.      Pharynx: Oropharynx is clear. Eyes:      Extraocular Movements: Extraocular movements intact. Conjunctiva/sclera: Conjunctivae normal.      Pupils: Pupils are equal, round, and reactive to light. Cardiovascular:      Rate and Rhythm: Normal rate and regular rhythm. Pulses: Normal pulses. Heart sounds: Normal heart sounds. No murmur heard. Pulmonary:      Effort: Pulmonary effort is normal. No respiratory distress. Breath sounds: Normal breath sounds. Abdominal:      General: Bowel sounds are normal.      Palpations: Abdomen is soft. Tenderness: There is no abdominal tenderness. Musculoskeletal:         General: Normal range of motion. Cervical back: Normal range of motion and neck supple. Skin:     General: Skin is warm and dry. Capillary Refill: Capillary refill takes less than 2 seconds. Neurological:      General: No focal deficit present. Mental Status: She is alert and oriented to person, place, and time. Mental status is at baseline. Psychiatric:         Mood and Affect: Mood normal.         Behavior: Behavior normal.         Thought Content:  Thought content normal.         Judgment: Judgment normal.          Radha Galeana, [address] [place] [address]

## 2023-08-09 ENCOUNTER — OFFICE VISIT (OUTPATIENT)
Dept: FAMILY MEDICINE CLINIC | Facility: CLINIC | Age: 38
End: 2023-08-09

## 2023-08-09 VITALS
OXYGEN SATURATION: 99 % | BODY MASS INDEX: 32.57 KG/M2 | HEIGHT: 63 IN | RESPIRATION RATE: 18 BRPM | DIASTOLIC BLOOD PRESSURE: 80 MMHG | TEMPERATURE: 97.8 F | HEART RATE: 93 BPM | SYSTOLIC BLOOD PRESSURE: 118 MMHG | WEIGHT: 183.8 LBS

## 2023-08-09 DIAGNOSIS — H66.012 NON-RECURRENT ACUTE SUPPURATIVE OTITIS MEDIA OF LEFT EAR WITH SPONTANEOUS RUPTURE OF TYMPANIC MEMBRANE: ICD-10-CM

## 2023-08-09 PROBLEM — H92.02 CHRONIC EAR PAIN, LEFT: Status: ACTIVE | Noted: 2023-08-09

## 2023-08-09 PROBLEM — H66.90 ACUTE EAR INFECTION: Status: ACTIVE | Noted: 2023-08-09

## 2023-08-09 PROBLEM — G89.29 CHRONIC EAR PAIN, LEFT: Status: ACTIVE | Noted: 2023-08-09

## 2023-08-09 PROBLEM — H60.90 OTITIS EXTERNA: Status: RESOLVED | Noted: 2023-07-25 | Resolved: 2023-08-09

## 2023-08-09 PROCEDURE — 99213 OFFICE O/P EST LOW 20 MIN: CPT

## 2023-08-09 RX ORDER — NAPROXEN 500 MG/1
500 TABLET ORAL 2 TIMES DAILY WITH MEALS
Qty: 30 TABLET | Refills: 0 | Status: CANCELLED | OUTPATIENT
Start: 2023-08-09

## 2023-08-09 RX ORDER — NAPROXEN 500 MG/1
500 TABLET ORAL 2 TIMES DAILY WITH MEALS
Qty: 30 TABLET | Refills: 0 | Status: SHIPPED | OUTPATIENT
Start: 2023-08-09

## 2023-08-09 NOTE — PROGRESS NOTES
Name: Thuy Sommers      : 1985      MRN: [id number]  Encounter Provider: GIORGIO English  Encounter Date: 2023   Encounter department: [address] [facility]     1. Non-recurrent acute suppurative otitis media of left ear with spontaneous rupture of tympanic membrane  Assessment & Plan:  Compliant with clindamycin and naproxen at this time. Advised to complete clindamycin. Requesting refill for naproxen to have just in care. Naproxen refilled. Advised patient that nsaids are not for long term use given associated complications. Advised that I will not be refilling after this refill. Advised patient to set up appt with ENT for further follow up given ruptured TM. Continue appropriate care such as keeping the ear dry when showering and refrain from cleaning the ears. Orders:  -     naproxen (Naprosyn) 500 mg tablet; Take 1 tablet (500 mg total) by mouth 2 (two) times a day with meals         Subjective      Thuy Sommers is a 34 y.o. female  has a past medical history of Asthma, GI bleed, and Psychiatric disorder. has a past surgical history that includes No past surgeries and  section. Pritesh Gamez is a 35 y/o female who presents to the office for ED follow up. She was seen in the office on 23 for a dental infection. She was given augmentin which helped dental pain. She was also treated with ear drops with AOE. She scheduled with a dentist, visit is many months outs. She presented to the ED for left ear pain on 23. She was diagnosed with left acute otitis media. She was given clindamycin x 5 days. She reports compliance. She is using naproxen prn for pain which is helping. Overall, pain is improving and she is feeling better. Earache   There is pain in the left ear. The problem has been gradually improving. There has been no fever. The pain is at a severity of 10/10.  The pain is severe. Pertinent negatives include no abdominal pain, diarrhea, ear discharge, neck pain or rash. She has tried antibiotics and NSAIDs for the symptoms. The treatment provided moderate relief. There is no history of hearing loss. Review of Systems   Constitutional: Negative for chills and unexpected weight change. HENT: Positive for ear pain. Negative for ear discharge and sinus pain. Eyes: Negative for pain and visual disturbance. Gastrointestinal: Negative for abdominal pain and diarrhea. Genitourinary: Negative for dysuria, flank pain and hematuria. Musculoskeletal: Negative for arthralgias and neck pain. Skin: Negative for rash. Neurological: Negative for dizziness and light-headedness. Psychiatric/Behavioral: Negative for confusion.        Current Outpatient Medications on File Prior to Visit   Medication Sig   • albuterol (2.5 mg/3 mL) 0.083 % nebulizer solution Take 3 mL (2.5 mg total) by nebulization every 6 (six) hours as needed for wheezing or shortness of breath   • albuterol (PROVENTIL HFA,VENTOLIN HFA) 90 mcg/act inhaler Inhale 2 puffs every 4 (four) hours as needed for wheezing   • clindamycin (CLEOCIN) 150 mg capsule Take 3 capsules (450 mg total) by mouth 3 (three) times a day for 5 days   • Diclofenac Sodium (VOLTAREN) 1 % Apply 2 g topically 4 (four) times a day (Patient not taking: Reported on 3/14/2023)   • divalproex sodium (DEPAKOTE) 250 mg EC tablet Take 800 mg by mouth 2 (two) times a day   • fluticasone (FLONASE) 50 mcg/act nasal spray 1 spray into each nostril daily   • gabapentin (NEURONTIN) 300 mg capsule    • QUEtiapine (SEROquel) 50 mg tablet TAKE 1 TABLET BY MOUTH TWICE DAILY FOR 7 DAYS THEN TAKE 1 TABLET BY MOUTH THREE TIMES DAILY   • [DISCONTINUED] naproxen (Naprosyn) 500 mg tablet Take 1 tablet (500 mg total) by mouth 2 (two) times a day with meals       Objective     /80 (BP Location: Left arm, Patient Position: Sitting, Cuff Size: Standard)   Pulse 93 Temp 97.8 °F (36.6 °C) (Temporal)   Resp 18   Ht 5' 3" (1.6 m)   Wt 83.4 kg (183 lb 12.8 oz)   LMP 07/24/2023 (Approximate)   SpO2 99%   BMI 32.56 kg/m²     Physical Exam  Vitals reviewed. Constitutional:       Appearance: Normal appearance. She is obese. HENT:      Head: Normocephalic and atraumatic. Right Ear: Ear canal and external ear normal.      Left Ear: Ear canal and external ear normal. Tympanic membrane is perforated. Nose: Nose normal.      Mouth/Throat:      Mouth: Mucous membranes are moist.   Eyes:      Conjunctiva/sclera: Conjunctivae normal.   Cardiovascular:      Rate and Rhythm: Normal rate and regular rhythm. Heart sounds: Normal heart sounds. Pulmonary:      Effort: Pulmonary effort is normal.      Breath sounds: Normal breath sounds. Abdominal:      General: Abdomen is flat. Bowel sounds are normal.      Palpations: Abdomen is soft. Musculoskeletal:        Feet:    Feet:      Left foot:      Skin integrity: Callus present. Skin:     General: Skin is warm and dry. Neurological:      Mental Status: She is alert and oriented to person, place, and time. Psychiatric:         Mood and Affect: Mood normal.         Behavior: Behavior normal.         Thought Content:  Thought content normal.         Judgment: Judgment normal.       Sue Reyes

## 2023-08-09 NOTE — ASSESSMENT & PLAN NOTE
Compliant with clindamycin and naproxen at this time. Advised to complete clindamycin. Requesting refill for naproxen to have just in care. Naproxen refilled. Advised patient that nsaids are not for long term use given associated complications. Advised that I will not be refilling after this refill. Advised patient to set up appt with ENT for further follow up given ruptured TM. Continue appropriate care such as keeping the ear dry when showering and refrain from cleaning the ears.

## 2023-09-10 DIAGNOSIS — J45.20 MILD INTERMITTENT ASTHMA WITHOUT COMPLICATION: ICD-10-CM

## 2023-09-13 RX ORDER — ALBUTEROL SULFATE 90 UG/1
AEROSOL, METERED RESPIRATORY (INHALATION)
Qty: 18 G | Refills: 0 | Status: SHIPPED | OUTPATIENT
Start: 2023-09-13

## 2023-11-24 ENCOUNTER — HOSPITAL ENCOUNTER (EMERGENCY)
Facility: HOSPITAL | Age: 38
Discharge: HOME | End: 2023-11-25
Attending: EMERGENCY MEDICINE
Payer: COMMERCIAL

## 2023-11-24 VITALS
SYSTOLIC BLOOD PRESSURE: 134 MMHG | DIASTOLIC BLOOD PRESSURE: 80 MMHG | HEART RATE: 81 BPM | RESPIRATION RATE: 16 BRPM | BODY MASS INDEX: 31.75 KG/M2 | WEIGHT: 179.23 LBS | TEMPERATURE: 98.2 F | OXYGEN SATURATION: 98 %

## 2023-11-24 DIAGNOSIS — R10.9 ABDOMINAL PAIN: Primary | ICD-10-CM

## 2023-11-24 DIAGNOSIS — R19.7 DIARRHEA: ICD-10-CM

## 2023-11-24 DIAGNOSIS — K64.9 HEMORRHOIDS: ICD-10-CM

## 2023-11-24 DIAGNOSIS — R11.2 NAUSEA AND VOMITING: ICD-10-CM

## 2023-11-24 LAB
BASOPHILS # BLD AUTO: 0.07 THOUSANDS/ÂΜL (ref 0–0.1)
BASOPHILS NFR BLD AUTO: 1 % (ref 0–1)
EOSINOPHIL # BLD AUTO: 0.28 THOUSAND/ÂΜL (ref 0–0.61)
EOSINOPHIL NFR BLD AUTO: 3 % (ref 0–6)
ERYTHROCYTE [DISTWIDTH] IN BLOOD BY AUTOMATED COUNT: 13.2 % (ref 11.6–15.1)
HCT VFR BLD AUTO: 42.2 % (ref 34.8–46.1)
HGB BLD-MCNC: 13.8 G/DL (ref 11.5–15.4)
IMM GRANULOCYTES # BLD AUTO: 0.03 THOUSAND/UL (ref 0–0.2)
IMM GRANULOCYTES NFR BLD AUTO: 0 % (ref 0–2)
LYMPHOCYTES # BLD AUTO: 2.21 THOUSANDS/ÂΜL (ref 0.6–4.47)
LYMPHOCYTES NFR BLD AUTO: 26 % (ref 14–44)
MCH RBC QN AUTO: 29.2 PG (ref 26.8–34.3)
MCHC RBC AUTO-ENTMCNC: 32.7 G/DL (ref 31.4–37.4)
MCV RBC AUTO: 89 FL (ref 82–98)
MONOCYTES # BLD AUTO: 0.63 THOUSAND/ÂΜL (ref 0.17–1.22)
MONOCYTES NFR BLD AUTO: 8 % (ref 4–12)
NEUTROPHILS # BLD AUTO: 5.23 THOUSANDS/ÂΜL (ref 1.85–7.62)
NEUTS SEG NFR BLD AUTO: 62 % (ref 43–75)
NRBC BLD AUTO-RTO: 0 /100 WBCS
PLATELET # BLD AUTO: 348 THOUSANDS/UL (ref 149–390)
PMV BLD AUTO: 10.8 FL (ref 8.9–12.7)
RBC # BLD AUTO: 4.73 MILLION/UL (ref 3.81–5.12)
WBC # BLD AUTO: 8.45 THOUSAND/UL (ref 4.31–10.16)

## 2023-11-24 PROCEDURE — C9113 INJ PANTOPRAZOLE SODIUM, VIA: HCPCS | Performed by: EMERGENCY MEDICINE

## 2023-11-24 PROCEDURE — 96374 THER/PROPH/DIAG INJ IV PUSH: CPT

## 2023-11-24 PROCEDURE — 99284 EMERGENCY DEPT VISIT MOD MDM: CPT | Performed by: EMERGENCY MEDICINE

## 2023-11-24 PROCEDURE — 99283 EMERGENCY DEPT VISIT LOW MDM: CPT

## 2023-11-24 PROCEDURE — 96375 TX/PRO/DX INJ NEW DRUG ADDON: CPT

## 2023-11-24 PROCEDURE — 85025 COMPLETE CBC W/AUTO DIFF WBC: CPT | Performed by: EMERGENCY MEDICINE

## 2023-11-24 PROCEDURE — 36415 COLL VENOUS BLD VENIPUNCTURE: CPT | Performed by: EMERGENCY MEDICINE

## 2023-11-24 PROCEDURE — 96361 HYDRATE IV INFUSION ADD-ON: CPT

## 2023-11-24 RX ORDER — MAGNESIUM HYDROXIDE/ALUMINUM HYDROXICE/SIMETHICONE 120; 1200; 1200 MG/30ML; MG/30ML; MG/30ML
15 SUSPENSION ORAL ONCE
Status: COMPLETED | OUTPATIENT
Start: 2023-11-24 | End: 2023-11-24

## 2023-11-24 RX ORDER — ONDANSETRON 4 MG/1
4 TABLET, ORALLY DISINTEGRATING ORAL EVERY 8 HOURS PRN
Qty: 20 TABLET | Refills: 0 | Status: SHIPPED | OUTPATIENT
Start: 2023-11-24

## 2023-11-24 RX ORDER — ONDANSETRON 2 MG/ML
4 INJECTION INTRAMUSCULAR; INTRAVENOUS ONCE
Status: COMPLETED | OUTPATIENT
Start: 2023-11-24 | End: 2023-11-24

## 2023-11-24 RX ORDER — LIDOCAINE HYDROCHLORIDE 20 MG/ML
1 JELLY TOPICAL ONCE
Status: COMPLETED | OUTPATIENT
Start: 2023-11-24 | End: 2023-11-24

## 2023-11-24 RX ORDER — PANTOPRAZOLE SODIUM 20 MG/1
20 TABLET, DELAYED RELEASE ORAL DAILY
Qty: 15 TABLET | Refills: 0 | Status: SHIPPED | OUTPATIENT
Start: 2023-11-24

## 2023-11-24 RX ORDER — LIDOCAINE HYDROCHLORIDE 20 MG/ML
15 SOLUTION OROPHARYNGEAL ONCE
Status: COMPLETED | OUTPATIENT
Start: 2023-11-24 | End: 2023-11-24

## 2023-11-24 RX ORDER — PANTOPRAZOLE SODIUM 40 MG/10ML
40 INJECTION, POWDER, LYOPHILIZED, FOR SOLUTION INTRAVENOUS ONCE
Status: COMPLETED | OUTPATIENT
Start: 2023-11-24 | End: 2023-11-24

## 2023-11-24 RX ADMIN — ONDANSETRON 4 MG: 2 INJECTION INTRAMUSCULAR; INTRAVENOUS at 22:36

## 2023-11-24 RX ADMIN — SODIUM CHLORIDE 1000 ML: 0.9 INJECTION, SOLUTION INTRAVENOUS at 22:36

## 2023-11-24 RX ADMIN — LIDOCAINE HYDROCHLORIDE 15 ML: 20 SOLUTION ORAL; TOPICAL at 22:40

## 2023-11-24 RX ADMIN — LIDOCAINE HYDROCHLORIDE 1 APPLICATION: 20 JELLY TOPICAL at 22:40

## 2023-11-24 RX ADMIN — ALUMINUM HYDROXIDE, MAGNESIUM HYDROXIDE, AND SIMETHICONE 15 ML: 200; 200; 20 SUSPENSION ORAL at 22:40

## 2023-11-24 RX ADMIN — MORPHINE SULFATE 2 MG: 2 INJECTION, SOLUTION INTRAMUSCULAR; INTRAVENOUS at 22:39

## 2023-11-24 RX ADMIN — PANTOPRAZOLE SODIUM 40 MG: 40 INJECTION, POWDER, FOR SOLUTION INTRAVENOUS at 22:36

## 2023-11-24 NOTE — Clinical Note
Vinicio Larson was seen and treated in our emergency department on 11/24/2023. Diagnosis:     Benjamin Otto  may return to work on return date. She may return on this date: 11/27/2023         If you have any questions or concerns, please don't hesitate to call.       Selam Coppola, DO    ______________________________           _______________          _______________  Hospital Representative                              Date                                Time

## 2023-11-25 NOTE — ED NOTES
Upon entering room to attempt to redraw a blood specimen, patient was noted to be eating a cereal bar. Patient was informed she should not be eating anything until tests and scans completed. Patient states she had a bad taste in her mouth. Patient had eaten almost the whole bar. Patient directing staff as to where she wants her blood drawn and is refusing most areas -points to both antecubital areas and shakes her finger no to other places that were looked at by asher and RN's. Provider is aware of  patient's refusal of repeat blood draw.      Alecia Ace RN  11/24/23 [id number]

## 2023-11-25 NOTE — ED PROVIDER NOTES
History  Chief Complaint   Patient presents with    Abdominal Pain     Pt reports via  that she has nausea, vomiting, diarrhea, and abdominal pain, and fatigue since yesterday. Reports she believes she has a virus. Took peptobismal with no relief. 40-year-old female presents with complaint of upper abdominal discomfort/burning along with n/v/d. She denies any fevers, CP, SOB, or other issues. Someone else at work has a stomach virus and she believes that she may have the same thing. She took a dose of tylenol and Pepto Bismol with minimal improvement of sxs. She is also complaining of hemorrhoids due to repeated bouts of diarrhea. Abdominal Pain  Pain location:  Epigastric  Pain quality: burning    Pain radiates to:  Does not radiate  Pain severity:  Mild  Onset quality:  Gradual  Duration:  1 day  Timing:  Constant  Progression:  Waxing and waning  Chronicity:  New  Relieved by:  Nothing  Worsened by:  Nothing  Ineffective treatments:  Acetaminophen (pepto)  Associated symptoms: diarrhea, nausea and vomiting    Associated symptoms: no anorexia, no chills, no constipation, no cough, no fever and no shortness of breath        Prior to Admission Medications   Prescriptions Last Dose Informant Patient Reported? Taking?    Diclofenac Sodium (VOLTAREN) 1 %   No No   Sig: Apply 2 g topically 4 (four) times a day   Patient not taking: Reported on 3/14/2023   QUEtiapine (SEROquel) 50 mg tablet   Yes No   Sig: TAKE 1 TABLET BY MOUTH TWICE DAILY FOR 7 DAYS THEN TAKE 1 TABLET BY MOUTH THREE TIMES DAILY   albuterol (PROVENTIL HFA,VENTOLIN HFA) 90 mcg/act inhaler   No No   Sig: INHALE 2 PUFFS BY MOUTH EVERY 4 HOURS AS NEEDED FOR WHEEZING   divalproex sodium (DEPAKOTE) 250 mg EC tablet   Yes No   Sig: Take 800 mg by mouth 2 (two) times a day   fluticasone (FLONASE) 50 mcg/act nasal spray   No No   Si spray into each nostril daily   gabapentin (NEURONTIN) 300 mg capsule   Yes No   naproxen (Naprosyn) 500 mg tablet   No No   Sig: Take 1 tablet (500 mg total) by mouth 2 (two) times a day with meals      Facility-Administered Medications: None       Past Medical History:   Diagnosis Date    Asthma     GI bleed 2019    Added automatically from request for surgery [id number]    Psychiatric disorder        Past Surgical History:   Procedure Laterality Date     SECTION      NO PAST SURGERIES         History reviewed. No pertinent family history. I have reviewed and agree with the history as documented. E-Cigarette/Vaping    E-Cigarette Use Never User      E-Cigarette/Vaping Substances    Nicotine No     THC No     CBD No     Flavoring No     Other No     Unknown No      Social History     Tobacco Use    Smoking status: Every Day     Packs/day: 1.00     Types: Cigarettes    Smokeless tobacco: Never   Vaping Use    Vaping Use: Never used   Substance Use Topics    Alcohol use: Not Currently     Comment: socially     Drug use: Not Currently       Review of Systems   Constitutional:  Negative for chills and fever. Respiratory:  Negative for cough and shortness of breath. Gastrointestinal:  Positive for abdominal pain, diarrhea, nausea, rectal pain (due to hemorrhoids) and vomiting. Negative for abdominal distention, anorexia and constipation. All other systems reviewed and are negative. Physical Exam  Physical Exam  Vitals and nursing note reviewed. Constitutional:       General: She is not in acute distress. Appearance: Normal appearance. She is well-developed. She is not ill-appearing, toxic-appearing or diaphoretic. HENT:      Head: Normocephalic and atraumatic. Right Ear: External ear normal.      Left Ear: External ear normal.      Nose: Nose normal. No congestion. Mouth/Throat:      Mouth: Mucous membranes are moist.      Pharynx: Oropharynx is clear. Eyes:      Conjunctiva/sclera: Conjunctivae normal.      Pupils: Pupils are equal, round, and reactive to light. Cardiovascular:      Rate and Rhythm: Normal rate and regular rhythm. Heart sounds: Normal heart sounds. Pulmonary:      Effort: Pulmonary effort is normal. No respiratory distress. Breath sounds: Normal breath sounds. No wheezing. Abdominal:      General: Bowel sounds are normal.      Palpations: Abdomen is soft. Tenderness: There is abdominal tenderness in the epigastric area. There is no guarding. Musculoskeletal:         General: No tenderness or deformity. Cervical back: Normal range of motion and neck supple. No rigidity. Skin:     General: Skin is warm and dry. Capillary Refill: Capillary refill takes less than 2 seconds. Findings: No rash. Neurological:      General: No focal deficit present. Mental Status: She is alert and oriented to person, place, and time.    Psychiatric:         Mood and Affect: Mood normal.         Behavior: Behavior normal.         Vital Signs  ED Triage Vitals   Temperature Pulse Respirations Blood Pressure SpO2   11/24/23 2145 11/24/23 2145 11/24/23 2145 11/24/23 2145 11/24/23 2145   98.2 °F (36.8 °C) (!) 109 20 131/83 97 %      Temp Source Heart Rate Source Patient Position - Orthostatic VS BP Location FiO2 (%)   11/24/23 2145 11/24/23 2145 11/24/23 2145 11/24/23 2145 --   Oral Monitor Sitting Left arm       Pain Score       11/24/23 2239       7           Vitals:    11/24/23 2145 11/24/23 2352   BP: 131/83 134/80   Pulse: (!) 109 81   Patient Position - Orthostatic VS: Sitting Lying         Visual Acuity      ED Medications  Medications   sodium chloride 0.9 % bolus 1,000 mL (0 mL Intravenous Stopped 11/25/23 0003)   morphine injection 2 mg (2 mg Intravenous Given 11/24/23 2239)   lidocaine (URO-JET) 2 % urethral/mucosal gel 1 Application (1 Application Topical Given 11/24/23 2240)   pantoprazole (PROTONIX) injection 40 mg (40 mg Intravenous Given 11/24/23 2236)   Lidocaine Viscous HCl (XYLOCAINE) 2 % mucosal solution 15 mL (15 mL Swish & Spit Given 11/24/23 2240)   aluminum-magnesium hydroxide-simethicone (MAALOX) oral suspension 15 mL (15 mL Oral Given 11/24/23 2240)   ondansetron (ZOFRAN) injection 4 mg (4 mg Intravenous Given 11/24/23 2236)       Diagnostic Studies  Results Reviewed       Procedure Component Value Units Date/Time    Lipase [[id number]] Updated: 11/24/23 2323    Lab Status: No result Specimen: Blood from Arm, Right     Comprehensive metabolic panel [[id number]] Updated: 11/24/23 2323    Lab Status: No result Specimen: Blood from Arm, Right     CBC and differential [[id number]] Collected: 11/24/23 2236    Lab Status: Final result Specimen: Blood from Arm, Right Updated: 11/24/23 2303     WBC 8.45 Thousand/uL      RBC 4.73 Million/uL      Hemoglobin 13.8 g/dL      Hematocrit 42.2 %      MCV 89 fL      MCH 29.2 pg      MCHC 32.7 g/dL      RDW 13.2 %      MPV 10.8 fL      Platelets 383 Thousands/uL      nRBC 0 /100 WBCs      Neutrophils Relative 62 %      Immat GRANS % 0 %      Lymphocytes Relative 26 %      Monocytes Relative 8 %      Eosinophils Relative 3 %      Basophils Relative 1 %      Neutrophils Absolute 5.23 Thousands/µL      Immature Grans Absolute 0.03 Thousand/uL      Lymphocytes Absolute 2.21 Thousands/µL      Monocytes Absolute 0.63 Thousand/µL      Eosinophils Absolute 0.28 Thousand/µL      Basophils Absolute 0.07 Thousands/µL     UA (URINE) with reflex to Scope [[id number]]     Lab Status: No result Specimen: Urine     POCT pregnancy, urine [[id number]]     Lab Status: No result                    No orders to display              Procedures  Procedures         ED Course                               SBIRT 22yo+      Flowsheet Row Most Recent Value   Initial Alcohol Screen: US AUDIT-C     1. How often do you have a drink containing alcohol? 0 Filed at: 11/24/2023 2149   2. How many drinks containing alcohol do you have on a typical day you are drinking? 0 Filed at: 11/24/2023 2149   3a. Male UNDER 65:  How often do you have five or more drinks on one occasion? 0 Filed at: 11/24/2023 2149   3b. FEMALE Any Age, or MALE 65+: How often do you have 4 or more drinks on one occassion? 0 Filed at: 11/24/2023 2149   Audit-C Score 0 Filed at: 11/24/2023 2149   CORINNE: How many times in the past year have you. .. Used an illegal drug or used a prescription medication for non-medical reasons? Never Filed at: 11/24/2023 2149                      Medical Decision Making  20-year-old female presents with complaint of epigastric burning along with nausea, vomiting, diarrhea, and hemorrhoids. Initial blood draw was not successful in obtaining other labs. Multiple other attempts were made and the patient was very uncooperative. She continually moved and refused to allow attempts to be made at locations that would most likely be successful. She continued to be on her cell phone and eat a granola bar despite being told not to eat anything. CBC was unremarkable and the patient's vitals were stable. Patient was deemed appropriate for discharge with medications to treat her symptoms. She was very concerned about the hemorrhoids. She has reportedly had 6 vaginal deliveries which has clearly contributed to their presence. I spent a great deal of time discussing supportive care measures to reduce the inflammation and discomfort of the hemorrhoids. She was provided with viscous lidocaine to apply tonight as well. She was also instructed on the importance of very close follow-up with her primary care provider along with referral to colorectal if the hemorrhoids continue to be an issue as they have been in the past.    Amount and/or Complexity of Data Reviewed  Labs: ordered. Risk  OTC drugs. Prescription drug management.              Disposition  Final diagnoses:   Abdominal pain   Nausea and vomiting   Diarrhea   Hemorrhoids     Time reflects when diagnosis was documented in both MDM as applicable and the Disposition within this note Time User Action Codes Description Comment    11/24/2023 11:42 PM Nova Levin Add [R10.9] Abdominal pain     11/24/2023 11:42 PM Nova Levin Add [R11.2] Nausea and vomiting     11/24/2023 11:42 PM Nova Levin Add [R19.7] Diarrhea     11/24/2023 11:43 PM Nova Levin Add [K64.9] Hemorrhoids           ED Disposition       ED Disposition   Discharge    Condition   Stable    Date/Time   Fri Nov 24, 2023 5438    Comment   Karely Rooney discharge to home/self care.                    Follow-up Information       Follow up With Specialties Details Why Contact Info    GIORGIO Craig Nurse Practitioner, Family Medicine   [address] [place] [id number] [phone]              Discharge Medication List as of 11/24/2023 11:45 PM        START taking these medications    Details   hydrocortisone-pramoxine (PROCTOFOAM-HC) 1-1 % FOAM rectal foam Insert 1 applicator into the rectum 2 (two) times a day, Starting Fri 11/24/2023, Normal      ondansetron (ZOFRAN-ODT) 4 mg disintegrating tablet Take 1 tablet (4 mg total) by mouth every 8 (eight) hours as needed for nausea or vomiting, Starting Fri 11/24/2023, Normal      pantoprazole (PROTONIX) 20 mg tablet Take 1 tablet (20 mg total) by mouth daily, Starting Fri 11/24/2023, Normal      witch hazel-glycerin (TUCKS) topical pad Insert 1 Pad into the rectum as needed for irritation, Starting Fri 11/24/2023, Normal           CONTINUE these medications which have NOT CHANGED    Details   albuterol (PROVENTIL HFA,VENTOLIN HFA) 90 mcg/act inhaler INHALE 2 PUFFS BY MOUTH EVERY 4 HOURS AS NEEDED FOR WHEEZING, Normal      Diclofenac Sodium (VOLTAREN) 1 % Apply 2 g topically 4 (four) times a day, Starting Tue 1/17/2023, Normal      divalproex sodium (DEPAKOTE) 250 mg EC tablet Take 800 mg by mouth 2 (two) times a day, Historical Med      fluticasone (FLONASE) 50 mcg/act nasal spray 1 spray into each nostril daily, Starting Thu 5/4/2023, Normal      gabapentin (NEURONTIN) 300 mg capsule Starting Wed 5/31/2023, Historical Med      naproxen (Naprosyn) 500 mg tablet Take 1 tablet (500 mg total) by mouth 2 (two) times a day with meals, Starting Wed 8/9/2023, Normal      QUEtiapine (SEROquel) 50 mg tablet TAKE 1 TABLET BY MOUTH TWICE DAILY FOR 7 DAYS THEN TAKE 1 TABLET BY MOUTH THREE TIMES DAILY, Historical Med             No discharge procedures on file.     PDMP Review       None            ED Provider  Electronically Signed by             Denisse Biswas DO  11/25/23 0007

## 2023-11-25 NOTE — ED NOTES
When this RN, another ED RN and tech attempted to obtain additional blood work, patient kept pulling hand away saying "No, you can only go up here," pointing to [facility] region of [facility]. Patient continues to eat crackers regardless of being told she needs to be NPO since she is complaining of vomiting. Patient holding phone with loud music playing and not allowing staff to obtain blood. Patient also refusing to urinate for additional testing. Provider aware.       Zoe Sewell RN  11/24/23 4004

## 2023-11-30 ENCOUNTER — HOSPITAL ENCOUNTER (EMERGENCY)
Facility: HOSPITAL | Age: 38
Discharge: HOME | End: 2023-11-30
Attending: EMERGENCY MEDICINE
Payer: COMMERCIAL

## 2023-11-30 ENCOUNTER — APPOINTMENT (EMERGENCY)
Dept: RADIOLOGY | Facility: HOSPITAL | Age: 38
End: 2023-11-30
Payer: COMMERCIAL

## 2023-11-30 VITALS
SYSTOLIC BLOOD PRESSURE: 153 MMHG | OXYGEN SATURATION: 99 % | TEMPERATURE: 98.5 F | DIASTOLIC BLOOD PRESSURE: 85 MMHG | RESPIRATION RATE: 18 BRPM | HEART RATE: 76 BPM

## 2023-11-30 DIAGNOSIS — R07.89 CHEST WALL PAIN: Primary | ICD-10-CM

## 2023-11-30 LAB
ANION GAP SERPL CALCULATED.3IONS-SCNC: 9 MMOL/L
BASOPHILS # BLD AUTO: 0.09 THOUSANDS/ÂΜL (ref 0–0.1)
BASOPHILS NFR BLD AUTO: 1 % (ref 0–1)
BUN SERPL-MCNC: 8 MG/DL (ref 5–25)
CALCIUM SERPL-MCNC: 9.1 MG/DL (ref 8.4–10.2)
CARDIAC TROPONIN I PNL SERPL HS: <2 NG/L
CHLORIDE SERPL-SCNC: 104 MMOL/L (ref 96–108)
CO2 SERPL-SCNC: 25 MMOL/L (ref 21–32)
CREAT SERPL-MCNC: 0.69 MG/DL (ref 0.6–1.3)
D DIMER PPP FEU-MCNC: 0.46 UG/ML FEU
EOSINOPHIL # BLD AUTO: 0.14 THOUSAND/ÂΜL (ref 0–0.61)
EOSINOPHIL NFR BLD AUTO: 2 % (ref 0–6)
ERYTHROCYTE [DISTWIDTH] IN BLOOD BY AUTOMATED COUNT: 13 % (ref 11.6–15.1)
GFR SERPL CREATININE-BSD FRML MDRD: 109 ML/MIN/1.73SQ M
GLUCOSE SERPL-MCNC: 91 MG/DL (ref 65–140)
HCT VFR BLD AUTO: 41.7 % (ref 34.8–46.1)
HGB BLD-MCNC: 13.5 G/DL (ref 11.5–15.4)
IMM GRANULOCYTES # BLD AUTO: 0.01 THOUSAND/UL (ref 0–0.2)
IMM GRANULOCYTES NFR BLD AUTO: 0 % (ref 0–2)
LYMPHOCYTES # BLD AUTO: 2.22 THOUSANDS/ÂΜL (ref 0.6–4.47)
LYMPHOCYTES NFR BLD AUTO: 28 % (ref 14–44)
MCH RBC QN AUTO: 29 PG (ref 26.8–34.3)
MCHC RBC AUTO-ENTMCNC: 32.4 G/DL (ref 31.4–37.4)
MCV RBC AUTO: 90 FL (ref 82–98)
MONOCYTES # BLD AUTO: 0.54 THOUSAND/ÂΜL (ref 0.17–1.22)
MONOCYTES NFR BLD AUTO: 7 % (ref 4–12)
NEUTROPHILS # BLD AUTO: 4.98 THOUSANDS/ÂΜL (ref 1.85–7.62)
NEUTS SEG NFR BLD AUTO: 62 % (ref 43–75)
NRBC BLD AUTO-RTO: 0 /100 WBCS
PLATELET # BLD AUTO: 365 THOUSANDS/UL (ref 149–390)
PMV BLD AUTO: 9.9 FL (ref 8.9–12.7)
POTASSIUM SERPL-SCNC: 3.5 MMOL/L (ref 3.5–5.3)
RBC # BLD AUTO: 4.65 MILLION/UL (ref 3.81–5.12)
SODIUM SERPL-SCNC: 138 MMOL/L (ref 135–147)
WBC # BLD AUTO: 7.98 THOUSAND/UL (ref 4.31–10.16)

## 2023-11-30 PROCEDURE — 99285 EMERGENCY DEPT VISIT HI MDM: CPT | Performed by: EMERGENCY MEDICINE

## 2023-11-30 PROCEDURE — 36415 COLL VENOUS BLD VENIPUNCTURE: CPT | Performed by: EMERGENCY MEDICINE

## 2023-11-30 PROCEDURE — 85379 FIBRIN DEGRADATION QUANT: CPT | Performed by: EMERGENCY MEDICINE

## 2023-11-30 PROCEDURE — 99284 EMERGENCY DEPT VISIT MOD MDM: CPT

## 2023-11-30 PROCEDURE — 80048 BASIC METABOLIC PNL TOTAL CA: CPT | Performed by: EMERGENCY MEDICINE

## 2023-11-30 PROCEDURE — 85025 COMPLETE CBC W/AUTO DIFF WBC: CPT | Performed by: EMERGENCY MEDICINE

## 2023-11-30 PROCEDURE — 71045 X-RAY EXAM CHEST 1 VIEW: CPT

## 2023-11-30 PROCEDURE — 84484 ASSAY OF TROPONIN QUANT: CPT | Performed by: EMERGENCY MEDICINE

## 2023-11-30 PROCEDURE — 96374 THER/PROPH/DIAG INJ IV PUSH: CPT

## 2023-11-30 PROCEDURE — 93005 ELECTROCARDIOGRAM TRACING: CPT

## 2023-11-30 RX ORDER — KETOROLAC TROMETHAMINE 30 MG/ML
15 INJECTION, SOLUTION INTRAMUSCULAR; INTRAVENOUS ONCE
Status: COMPLETED | OUTPATIENT
Start: 2023-11-30 | End: 2023-11-30

## 2023-11-30 RX ADMIN — KETOROLAC TROMETHAMINE 15 MG: 30 INJECTION, SOLUTION INTRAMUSCULAR; INTRAVENOUS at 22:21

## 2023-11-30 NOTE — Clinical Note
Alessandro Su was seen and treated in our emergency department on 11/30/2023. Diagnosis:     Khadar Norton  may return to work on return date. She may return on this date: 12/02/2023         If you have any questions or concerns, please don't hesitate to call.       Saji Choudhary MD    ______________________________           _______________          _______________  Hospital Representative                              Date                                Time

## 2023-12-01 LAB
ATRIAL RATE: 76 BPM
P AXIS: 53 DEGREES
PR INTERVAL: 134 MS
QRS AXIS: 12 DEGREES
QRSD INTERVAL: 76 MS
QT INTERVAL: 396 MS
QTC INTERVAL: 445 MS
T WAVE AXIS: 20 DEGREES
VENTRICULAR RATE: 76 BPM

## 2023-12-01 NOTE — ED PROCEDURE NOTE
PROCEDURE  ECG 12 Lead Documentation Only    Date/Time: 11/30/2023 9:28 PM    Performed by: Britany Esposito MD  Authorized by: Britany Esposito MD    Indications / Diagnosis:  Pleuritic cp  ECG reviewed by me, the ED Provider: yes    Patient location:  ED  Interpretation:     Interpretation: normal    Rate:     ECG rate:  76    ECG rate assessment: normal    Rhythm:     Rhythm: sinus rhythm    Ectopy:     Ectopy: none    QRS:     QRS axis:  Normal    QRS intervals:  Normal  Conduction:     Conduction: normal    ST segments:     ST segments:  Normal  T waves:     T waves: normal         Britany Esposito MD  11/30/23 2135

## 2023-12-01 NOTE — ED PROVIDER NOTES
History  Chief Complaint   Patient presents with    Pain With Breathing     Brought by [facility]. C/o Dyspnea with "pain in lungs for 3 days. Patient is a 78-year-old female who presents via EMS from work with an acute onset of a pinching or poking sensation when she breathes. Sternal.  No radiation. Acute onset today while at her job as a . H/o asthma. Does smoke 1 ppd. States different from her asthma. No recent trauma/surgery. No on any OCP. No recent travel. Was just seen here recently for GI issues. Prior to Admission Medications   Prescriptions Last Dose Informant Patient Reported? Taking?    Diclofenac Sodium (VOLTAREN) 1 %   No No   Sig: Apply 2 g topically 4 (four) times a day   Patient not taking: Reported on 3/14/2023   QUEtiapine (SEROquel) 50 mg tablet   Yes No   Sig: TAKE 1 TABLET BY MOUTH TWICE DAILY FOR 7 DAYS THEN TAKE 1 TABLET BY MOUTH THREE TIMES DAILY   albuterol (PROVENTIL HFA,VENTOLIN HFA) 90 mcg/act inhaler   No No   Sig: INHALE 2 PUFFS BY MOUTH EVERY 4 HOURS AS NEEDED FOR WHEEZING   divalproex sodium (DEPAKOTE) 250 mg EC tablet   Yes No   Sig: Take 800 mg by mouth 2 (two) times a day   fluticasone (FLONASE) 50 mcg/act nasal spray   No No   Si spray into each nostril daily   gabapentin (NEURONTIN) 300 mg capsule   Yes No   hydrocortisone-pramoxine (PROCTOFOAM-HC) 1-1 % FOAM rectal foam   No No   Sig: Insert 1 applicator into the rectum 2 (two) times a day   naproxen (Naprosyn) 500 mg tablet   No No   Sig: Take 1 tablet (500 mg total) by mouth 2 (two) times a day with meals   ondansetron (ZOFRAN-ODT) 4 mg disintegrating tablet   No No   Sig: Take 1 tablet (4 mg total) by mouth every 8 (eight) hours as needed for nausea or vomiting   pantoprazole (PROTONIX) 20 mg tablet   No No   Sig: Take 1 tablet (20 mg total) by mouth daily   witch hazel-glycerin (TUCKS) topical pad   No No   Sig: Insert 1 Pad into the rectum as needed for irritation      Facility-Administered Medications: None       Past Medical History:   Diagnosis Date    Asthma     GI bleed 2019    Added automatically from request for surgery [id number]    Psychiatric disorder        Past Surgical History:   Procedure Laterality Date     SECTION      NO PAST SURGERIES         History reviewed. No pertinent family history. I have reviewed and agree with the history as documented. E-Cigarette/Vaping    E-Cigarette Use Never User      E-Cigarette/Vaping Substances    Nicotine No     THC No     CBD No     Flavoring No     Other No     Unknown No      Social History     Tobacco Use    Smoking status: Every Day     Packs/day: 1.00     Types: Cigarettes    Smokeless tobacco: Never   Vaping Use    Vaping Use: Never used   Substance Use Topics    Alcohol use: Not Currently     Comment: socially     Drug use: Not Currently       Review of Systems   Constitutional: Negative. HENT: Negative. Eyes: Negative. Respiratory:  Positive for shortness of breath. Cardiovascular:  Positive for chest pain. Gastrointestinal: Negative. Endocrine: Negative. Genitourinary: Negative. Musculoskeletal: Negative. Skin: Negative. Allergic/Immunologic: Negative. Neurological: Negative. Hematological: Negative. Psychiatric/Behavioral: Negative. All other systems reviewed and are negative. Physical Exam  Physical Exam  Vitals and nursing note reviewed. Constitutional:       Appearance: Normal appearance. She is obese. HENT:      Head: Normocephalic and atraumatic. Nose: Nose normal.      Mouth/Throat:      Mouth: Mucous membranes are moist.      Pharynx: Oropharynx is clear. Cardiovascular:      Rate and Rhythm: Normal rate and regular rhythm. Pulses: Normal pulses. Heart sounds: Normal heart sounds. Pulmonary:      Effort: Pulmonary effort is normal.      Breath sounds: Normal breath sounds. Comments: +reproducable ttp over the sternum.    Chest:      Chest wall: Tenderness present. Abdominal:      General: Bowel sounds are normal.      Palpations: Abdomen is soft. Musculoskeletal:         General: No swelling or tenderness. Normal range of motion. Cervical back: Normal range of motion and neck supple. Right lower leg: No edema. Left lower leg: No edema. Skin:     General: Skin is warm and dry. Capillary Refill: Capillary refill takes less than 2 seconds. Neurological:      General: No focal deficit present. Mental Status: She is alert.    Psychiatric:         Mood and Affect: Mood normal.         Behavior: Behavior normal.         Vital Signs  ED Triage Vitals   Temperature Pulse Respirations Blood Pressure SpO2   11/30/23 2110 11/30/23 2110 11/30/23 2110 11/30/23 2110 11/30/23 2110   98.5 °F (36.9 °C) 76 18 153/85 99 %      Temp Source Heart Rate Source Patient Position - Orthostatic VS BP Location FiO2 (%)   11/30/23 2110 11/30/23 2110 11/30/23 2110 11/30/23 2110 --   Oral Monitor Lying Left arm       Pain Score       11/30/23 2221       7           Vitals:    11/30/23 2110   BP: 153/85   Pulse: 76   Patient Position - Orthostatic VS: Lying         Visual Acuity      ED Medications  Medications   ketorolac (TORADOL) injection 15 mg (15 mg Intravenous Given 11/30/23 2221)       Diagnostic Studies  Results Reviewed       Procedure Component Value Units Date/Time    HS Troponin 0hr (reflex protocol) [[id number]]  (Normal) Collected: 11/30/23 2121    Lab Status: Final result Specimen: Blood from Arm, Right Updated: 11/30/23 2152     hs TnI 0hr <2 ng/L     Basic metabolic panel [[id number]] Collected: 11/30/23 2121    Lab Status: Final result Specimen: Blood from Arm, Right Updated: 11/30/23 2147     Sodium 138 mmol/L      Potassium 3.5 mmol/L      Chloride 104 mmol/L      CO2 25 mmol/L      ANION GAP 9 mmol/L      BUN 8 mg/dL      Creatinine 0.69 mg/dL      Glucose 91 mg/dL      Calcium 9.1 mg/dL      eGFR 109 ml/min/1.73sq m     Narrative: National Kidney Disease Foundation guidelines for Chronic Kidney Disease (CKD):     Stage 1 with normal or high GFR (GFR > 90 mL/min/1.73 square meters)    Stage 2 Mild CKD (GFR = 60-89 mL/min/1.73 square meters)    Stage 3A Moderate CKD (GFR = 45-59 mL/min/1.73 square meters)    Stage 3B Moderate CKD (GFR = 30-44 mL/min/1.73 square meters)    Stage 4 Severe CKD (GFR = 15-29 mL/min/1.73 square meters)    Stage 5 End Stage CKD (GFR <15 mL/min/1.73 square meters)  Note: GFR calculation is accurate only with a steady state creatinine    D-dimer, quantitative [[id number]]  (Normal) Collected: 11/30/23 2121    Lab Status: Final result Specimen: Blood from Arm, Right Updated: 11/30/23 2143     D-Dimer, Quant 0.46 ug/ml FEU     CBC and differential [[id number]] Collected: 11/30/23 2121    Lab Status: Final result Specimen: Blood from Arm, Right Updated: 11/30/23 2135     WBC 7.98 Thousand/uL      RBC 4.65 Million/uL      Hemoglobin 13.5 g/dL      Hematocrit 41.7 %      MCV 90 fL      MCH 29.0 pg      MCHC 32.4 g/dL      RDW 13.0 %      MPV 9.9 fL      Platelets 576 Thousands/uL      nRBC 0 /100 WBCs      Neutrophils Relative 62 %      Immat GRANS % 0 %      Lymphocytes Relative 28 %      Monocytes Relative 7 %      Eosinophils Relative 2 %      Basophils Relative 1 %      Neutrophils Absolute 4.98 Thousands/µL      Immature Grans Absolute 0.01 Thousand/uL      Lymphocytes Absolute 2.22 Thousands/µL      Monocytes Absolute 0.54 Thousand/µL      Eosinophils Absolute 0.14 Thousand/µL      Basophils Absolute 0.09 Thousands/µL                    XR chest portable   ED Interpretation by Karlos Cardenas MD (11/30 2142)   NAD      Final Result by Melody Wood MD (12/01 0940)      No acute cardiopulmonary disease.                   Workstation performed: [id number]                    Procedures  Procedures         ED Course  ED Course as of 12/01/23 1513   u Nov 30, 2023 2142 WBC: 7.98   2142 Hemoglobin: 13.5   2152 D-Dimer, Quant: 0.46   2152 D dimer wnl,  PERC 0   2220 hs TnI 0hr: <2   0116 Went over results. Will dc. HEART Risk Score      Flowsheet Row Most Recent Value   Heart Score Risk Calculator    History 0 Filed at: 11/30/2023 2220   ECG 0 Filed at: 11/30/2023 2220   Age 0 Filed at: 11/30/2023 2220   Risk Factors 1 Filed at: 11/30/2023 2220   Troponin 0 Filed at: 11/30/2023 2220   HEART Score 1 Filed at: 11/30/2023 2220                  PERC Rule for PE      Flowsheet Row Most Recent Value   PERC Rule for PE    Age >=50 0 Filed at: 11/30/2023 2120   HR >=100 0 Filed at: 11/30/2023 2120   O2 Sat on room air < 95% 0 Filed at: 11/30/2023 2120   History of PE or DVT 0 Filed at: 11/30/2023 2120   Recent trauma or surgery 0 Filed at: 11/30/2023 2120   Hemoptysis 0 Filed at: 11/30/2023 2120   Exogenous estrogen 0 Filed at: 11/30/2023 2120   Unilateral leg swelling 0 Filed at: 11/30/2023 2120   PERC Rule for PE Results 0 Filed at: 11/30/2023 2120                              Medical Decision Making  Problems Addressed:  Chest wall pain:     Details: vitals, labs unremarkable. evaluated with PERC and HEART. Amount and/or Complexity of Data Reviewed  Labs: ordered. Decision-making details documented in ED Course. Radiology: ordered and independent interpretation performed. Decision-making details documented in ED Course. ECG/medicine tests: ordered and independent interpretation performed. Decision-making details documented in ED Course. Risk  Prescription drug management.              Disposition  Final diagnoses:   Chest wall pain     Time reflects when diagnosis was documented in both MDM as applicable and the Disposition within this note       Time User Action Codes Description Comment    11/30/2023 10:32 PM [place] Po Add [R07.89] Chest wall pain           ED Disposition       ED Disposition   Discharge    Condition   Stable    Date/Time   Thu Nov 30, 2023 2232    Comment   Yoav Ortega discharge to home/self care. Follow-up Information       Follow up With Specialties Details Why Contact Info    GIORGIO Knott Nurse Practitioner, Family Medicine   [address] [facility] [address] [phone]              Discharge Medication List as of 11/30/2023 10:32 PM        CONTINUE these medications which have NOT CHANGED    Details   albuterol (PROVENTIL HFA,VENTOLIN HFA) 90 mcg/act inhaler INHALE 2 PUFFS BY MOUTH EVERY 4 HOURS AS NEEDED FOR WHEEZING, Normal      Diclofenac Sodium (VOLTAREN) 1 % Apply 2 g topically 4 (four) times a day, Starting Tue 1/17/2023, Normal      divalproex sodium (DEPAKOTE) 250 mg EC tablet Take 800 mg by mouth 2 (two) times a day, Historical Med      fluticasone (FLONASE) 50 mcg/act nasal spray 1 spray into each nostril daily, Starting Thu 5/4/2023, Normal      gabapentin (NEURONTIN) 300 mg capsule Starting Wed 5/31/2023, Historical Med      hydrocortisone-pramoxine (PROCTOFOAM-HC) 1-1 % FOAM rectal foam Insert 1 applicator into the rectum 2 (two) times a day, Starting Fri 11/24/2023, Normal      naproxen (Naprosyn) 500 mg tablet Take 1 tablet (500 mg total) by mouth 2 (two) times a day with meals, Starting Wed 8/9/2023, Normal      ondansetron (ZOFRAN-ODT) 4 mg disintegrating tablet Take 1 tablet (4 mg total) by mouth every 8 (eight) hours as needed for nausea or vomiting, Starting Fri 11/24/2023, Normal      pantoprazole (PROTONIX) 20 mg tablet Take 1 tablet (20 mg total) by mouth daily, Starting Fri 11/24/2023, Normal      QUEtiapine (SEROquel) 50 mg tablet TAKE 1 TABLET BY MOUTH TWICE DAILY FOR 7 DAYS THEN TAKE 1 TABLET BY MOUTH THREE TIMES DAILY, Historical Med      witch hazel-glycerin (TUCKS) topical pad Insert 1 Pad into the rectum as needed for irritation, Starting Fri 11/24/2023, Normal             No discharge procedures on file.     PDMP Review       None            ED Provider  Electronically Signed by             Clinton Mabry Ignacio Cook MD  12/01/23 5140

## 2024-02-25 ENCOUNTER — HOSPITAL ENCOUNTER (EMERGENCY)
Facility: HOSPITAL | Age: 39
Discharge: HOME | End: 2024-02-25
Attending: EMERGENCY MEDICINE
Payer: COMMERCIAL

## 2024-02-25 VITALS
RESPIRATION RATE: 20 BRPM | TEMPERATURE: 98.1 F | HEART RATE: 100 BPM | OXYGEN SATURATION: 99 % | DIASTOLIC BLOOD PRESSURE: 88 MMHG | SYSTOLIC BLOOD PRESSURE: 146 MMHG

## 2024-02-25 DIAGNOSIS — B34.9 VIRAL SYNDROME: Primary | ICD-10-CM

## 2024-02-25 LAB
FLUAV RNA RESP QL NAA+PROBE: NEGATIVE
FLUBV RNA RESP QL NAA+PROBE: NEGATIVE
RSV RNA RESP QL NAA+PROBE: NEGATIVE
SARS-COV-2 RNA RESP QL NAA+PROBE: NEGATIVE

## 2024-02-25 PROCEDURE — 99284 EMERGENCY DEPT VISIT MOD MDM: CPT | Performed by: PHYSICIAN ASSISTANT

## 2024-02-25 PROCEDURE — 0241U HB NFCT DS VIR RESP RNA 4 TRGT: CPT | Performed by: PHYSICIAN ASSISTANT

## 2024-02-25 PROCEDURE — 99283 EMERGENCY DEPT VISIT LOW MDM: CPT

## 2024-02-25 RX ORDER — LOPERAMIDE HYDROCHLORIDE 2 MG/1
2 CAPSULE ORAL 4 TIMES DAILY PRN
Qty: 12 CAPSULE | Refills: 0 | Status: SHIPPED | OUTPATIENT
Start: 2024-02-25

## 2024-02-25 RX ORDER — ONDANSETRON 4 MG/1
4 TABLET, ORALLY DISINTEGRATING ORAL ONCE
Status: COMPLETED | OUTPATIENT
Start: 2024-02-25 | End: 2024-02-25

## 2024-02-25 RX ORDER — ONDANSETRON 4 MG/1
4 TABLET, ORALLY DISINTEGRATING ORAL EVERY 6 HOURS PRN
Qty: 9 TABLET | Refills: 0 | Status: SHIPPED | OUTPATIENT
Start: 2024-02-25

## 2024-02-25 RX ORDER — LOPERAMIDE HYDROCHLORIDE 2 MG/1
2 CAPSULE ORAL ONCE
Status: COMPLETED | OUTPATIENT
Start: 2024-02-25 | End: 2024-02-25

## 2024-02-25 RX ADMIN — ONDANSETRON 4 MG: 4 TABLET, ORALLY DISINTEGRATING ORAL at 22:14

## 2024-02-25 RX ADMIN — LOPERAMIDE HYDROCHLORIDE 2 MG: 2 CAPSULE ORAL at 22:14

## 2024-02-25 NOTE — Clinical Note
Marilee Ayala was seen and treated in our emergency department on 2/25/2024.    No restrictions            Diagnosis:     Marilee  may return to work on return date.    She may return on this date: 02/27/2024         If you have any questions or concerns, please don't hesitate to call.      Kelsey Alexander PA-C    ______________________________           _______________          _______________  Hospital Representative                              Date                                Time

## 2024-02-26 NOTE — ED PROVIDER NOTES
History  Chief Complaint   Patient presents with    Flu Symptoms     Pt reports flu like symptoms x 2 days w/ vomit x 2 today.  States she has V/D and a cough.  Pt family is also starting to have similar symptoms.  No meds PTA.      38-year-old female without significant past medical history presents complaining of nausea and diarrhea for the past 2 days.  Positive sick contacts at home.  Patient states that she was at work when she began having diarrhea and vomiting again and needed to be seen in the emergency department before being able to return.  During HPI patient admitted to abdominal pain and vomiting however while taking her history patient asked me to hold while she finished placing her [employer] order on her phone.  Denies fevers, denies chest pain or shortness of breath.  Denies any other complaints.      History provided by:  Patient   used: No        Prior to Admission Medications   Prescriptions Last Dose Informant Patient Reported? Taking?   Diclofenac Sodium (VOLTAREN) 1 %   No No   Sig: Apply 2 g topically 4 (four) times a day   Patient not taking: Reported on 3/14/2023   QUEtiapine (SEROquel) 50 mg tablet   Yes No   Sig: TAKE 1 TABLET BY MOUTH TWICE DAILY FOR 7 DAYS THEN TAKE 1 TABLET BY MOUTH THREE TIMES DAILY   albuterol (PROVENTIL HFA,VENTOLIN HFA) 90 mcg/act inhaler   No No   Sig: INHALE 2 PUFFS BY MOUTH EVERY 4 HOURS AS NEEDED FOR WHEEZING   divalproex sodium (DEPAKOTE) 250 mg EC tablet   Yes No   Sig: Take 800 mg by mouth 2 (two) times a day   fluticasone (FLONASE) 50 mcg/act nasal spray   No No   Si spray into each nostril daily   gabapentin (NEURONTIN) 300 mg capsule   Yes No   hydrocortisone-pramoxine (PROCTOFOAM-HC) 1-1 % FOAM rectal foam   No No   Sig: Insert 1 applicator into the rectum 2 (two) times a day   naproxen (Naprosyn) 500 mg tablet   No No   Sig: Take 1 tablet (500 mg total) by mouth 2 (two) times a day with meals   ondansetron (ZOFRAN-ODT) 4 mg  disintegrating tablet   No No   Sig: Take 1 tablet (4 mg total) by mouth every 8 (eight) hours as needed for nausea or vomiting   pantoprazole (PROTONIX) 20 mg tablet   No No   Sig: Take 1 tablet (20 mg total) by mouth daily   witch hazel-glycerin (TUCKS) topical pad   No No   Sig: Insert 1 Pad into the rectum as needed for irritation      Facility-Administered Medications: None       Past Medical History:   Diagnosis Date    Asthma     GI bleed 2019    Added automatically from request for surgery [id number]    Psychiatric disorder        Past Surgical History:   Procedure Laterality Date     SECTION      NO PAST SURGERIES         History reviewed. No pertinent family history.  I have reviewed and agree with the history as documented.    E-Cigarette/Vaping    E-Cigarette Use Never User      E-Cigarette/Vaping Substances    Nicotine No     THC No     CBD No     Flavoring No     Other No     Unknown No      Social History     Tobacco Use    Smoking status: Every Day     Current packs/day: 1.00     Types: Cigarettes    Smokeless tobacco: Never   Vaping Use    Vaping status: Never Used   Substance Use Topics    Alcohol use: Not Currently     Comment: socially     Drug use: Not Currently       Review of Systems   Constitutional: Negative.  Negative for chills and fatigue.   HENT:  Negative for ear pain and sore throat.    Eyes:  Negative for photophobia and redness.   Respiratory:  Negative for apnea, cough and shortness of breath.    Cardiovascular:  Negative for chest pain.   Gastrointestinal:  Positive for diarrhea and nausea. Negative for abdominal pain and vomiting.   Genitourinary:  Negative for dysuria.   Musculoskeletal:  Negative for arthralgias, neck pain and neck stiffness.   Skin:  Negative for rash.   Neurological:  Negative for dizziness, tremors, syncope and weakness.   Psychiatric/Behavioral:  Negative for suicidal ideas.        Physical Exam  Physical Exam  Constitutional:       General: She is  not in acute distress.     Appearance: She is well-developed. She is not diaphoretic.   Eyes:      Pupils: Pupils are equal, round, and reactive to light.   Cardiovascular:      Rate and Rhythm: Normal rate and regular rhythm.   Pulmonary:      Effort: Pulmonary effort is normal. No respiratory distress.      Breath sounds: Normal breath sounds.   Abdominal:      General: Bowel sounds are normal. There is no distension.      Palpations: Abdomen is soft.      Tenderness: There is abdominal tenderness. There is no right CVA tenderness, left CVA tenderness or guarding.      Comments: Diffuse nonspecific    Musculoskeletal:         General: Normal range of motion.      Cervical back: Normal range of motion and neck supple.   Skin:     General: Skin is warm and dry.   Neurological:      Mental Status: She is alert and oriented to person, place, and time.         Vital Signs  ED Triage Vitals [02/25/24 2140]   Temperature Pulse Respirations Blood Pressure SpO2   98.1 °F (36.7 °C) 100 20 146/88 99 %      Temp Source Heart Rate Source Patient Position - Orthostatic VS BP Location FiO2 (%)   Oral Monitor Sitting Left arm --      Pain Score       --           Vitals:    02/25/24 2140   BP: 146/88   Pulse: 100   Patient Position - Orthostatic VS: Sitting         Visual Acuity      ED Medications  Medications   ondansetron (ZOFRAN-ODT) dispersible tablet 4 mg (has no administration in time range)   loperamide (IMODIUM) capsule 2 mg (has no administration in time range)       Diagnostic Studies  Results Reviewed       Procedure Component Value Units Date/Time    FLU/RSV/COVID - if FLU/RSV clinically relevant [[id number]] Collected: 02/25/24 2207    Lab Status: In process Specimen: Nares from Nose Updated: 02/25/24 2207                   No orders to display              Procedures  Procedures         ED Course                                             Medical Decision Making  Patient had largely benign history and physical exam  in the emergency department today.  Given multiple positive sick contact symptoms thought to be viral in nature.  Patient had nonspecific abdominal complaints emergency department and was seen placing an order for fast food stating that she was hungry and able to eat now.  Patient was given medications for supportive care.  Educated on return precautions.  Discharged home.    Risk  Prescription drug management.             Disposition  Final diagnoses:   Viral syndrome     Time reflects when diagnosis was documented in both MDM as applicable and the Disposition within this note       Time User Action Codes Description Comment    2/25/2024 10:11 PM Kelsey Alexander [B34.9] Viral syndrome           ED Disposition       ED Disposition   Discharge    Condition   Stable    Date/Time   Sun Feb 25, 2024 10:11 PM    Comment   Marilee Ayala discharge to home/self care.                   Follow-up Information       Follow up With Specialties Details Why Contact Info Additional Information    [facility] Emergency Department Emergency Medicine Go to  If symptoms worsen [address] [facility] [address] [phone] Critical access hospital Emergency Department    GIORGIO Ferguson Nurse Practitioner, Family Medicine Call  As needed [address] [facility] [address] [phone]               Patient's Medications   Discharge Prescriptions    LOPERAMIDE (IMODIUM) 2 MG CAPSULE    Take 1 capsule (2 mg total) by mouth 4 (four) times a day as needed for diarrhea       Start Date: 2/25/2024 End Date: --       Order Dose: 2 mg       Quantity: 12 capsule    Refills: 0    ONDANSETRON (ZOFRAN ODT) 4 MG DISINTEGRATING TABLET    Take 1 tablet (4 mg total) by mouth every 6 (six) hours as needed for nausea or vomiting       Start Date: 2/25/2024 End Date: --       Order Dose: 4 mg       Quantity: 9 tablet    Refills: 0       No discharge procedures on file.    PDMP  Review       None            ED Provider  Electronically Signed by             Kelsey Alexander PA-C  02/25/24 2173       Kelsey Alexander PA-C  02/25/24 4941

## 2024-06-20 ENCOUNTER — TELEPHONE (OUTPATIENT)
Dept: FAMILY MEDICINE CLINIC | Facility: CLINIC | Age: 39
End: 2024-06-20

## 2024-06-20 NOTE — TELEPHONE ENCOUNTER
Please contact the pt and offer to schedule an appt with us for her annual gyn visit as she is over due or does she another provider for her gyn visits. Thank you

## 2024-07-08 ENCOUNTER — OFFICE VISIT (OUTPATIENT)
Dept: FAMILY MEDICINE CLINIC | Facility: CLINIC | Age: 39
End: 2024-07-08

## 2024-07-08 VITALS
WEIGHT: 166.6 LBS | OXYGEN SATURATION: 98 % | HEIGHT: 65 IN | RESPIRATION RATE: 18 BRPM | DIASTOLIC BLOOD PRESSURE: 78 MMHG | BODY MASS INDEX: 27.76 KG/M2 | SYSTOLIC BLOOD PRESSURE: 115 MMHG | HEART RATE: 95 BPM | TEMPERATURE: 97.5 F

## 2024-07-08 DIAGNOSIS — E66.3 OVERWEIGHT (BMI 25.0-29.9): ICD-10-CM

## 2024-07-08 DIAGNOSIS — E55.9 VITAMIN D DEFICIENCY: ICD-10-CM

## 2024-07-08 DIAGNOSIS — J32.0 CHRONIC MAXILLARY SINUSITIS: ICD-10-CM

## 2024-07-08 DIAGNOSIS — R52 GENERALIZED PAIN: ICD-10-CM

## 2024-07-08 DIAGNOSIS — Z12.31 ENCOUNTER FOR SCREENING MAMMOGRAM FOR MALIGNANT NEOPLASM OF BREAST: ICD-10-CM

## 2024-07-08 DIAGNOSIS — K04.7 DENTAL INFECTION: Primary | ICD-10-CM

## 2024-07-08 DIAGNOSIS — Z11.59 NEED FOR HEPATITIS C SCREENING TEST: ICD-10-CM

## 2024-07-08 DIAGNOSIS — J45.20 MILD INTERMITTENT ASTHMA WITHOUT COMPLICATION: ICD-10-CM

## 2024-07-08 DIAGNOSIS — Z11.4 ENCOUNTER FOR SCREENING FOR HIV: ICD-10-CM

## 2024-07-08 DIAGNOSIS — F33.40 RECURRENT MAJOR DEPRESSIVE DISORDER, IN REMISSION (HCC): ICD-10-CM

## 2024-07-08 DIAGNOSIS — B35.1 ONYCHOMYCOSIS: ICD-10-CM

## 2024-07-08 DIAGNOSIS — M79.671 RIGHT FOOT PAIN: ICD-10-CM

## 2024-07-08 DIAGNOSIS — L85.3 XEROSIS OF SKIN: ICD-10-CM

## 2024-07-08 PROBLEM — H66.90 ACUTE EAR INFECTION: Status: RESOLVED | Noted: 2023-08-09 | Resolved: 2024-07-08

## 2024-07-08 PROBLEM — J40 BRONCHITIS: Status: RESOLVED | Noted: 2023-05-04 | Resolved: 2024-07-08
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
## 2024-07-08 PROCEDURE — 99214 OFFICE O/P EST MOD 30 MIN: CPT

## 2024-07-08 RX ORDER — CICLOPIROX 80 MG/ML
SOLUTION TOPICAL
Qty: 18 ML | Refills: 1 | Status: SHIPPED | OUTPATIENT
Start: 2024-07-08

## 2024-07-08 RX ORDER — GABAPENTIN 100 MG/1
100 CAPSULE ORAL 3 TIMES DAILY
Qty: 90 CAPSULE | Refills: 0 | Status: SHIPPED | OUTPATIENT
Start: 2024-07-08

## 2024-07-08 RX ORDER — ALBUTEROL SULFATE 90 UG/1
2 AEROSOL, METERED RESPIRATORY (INHALATION) EVERY 6 HOURS PRN
Qty: 18 G | Refills: 0 | Status: SHIPPED | OUTPATIENT
Start: 2024-07-08

## 2024-07-08 RX ORDER — AMOXICILLIN AND CLAVULANATE POTASSIUM 875; 125 MG/1; MG/1
1 TABLET, FILM COATED ORAL EVERY 12 HOURS SCHEDULED
Qty: 20 TABLET | Refills: 0 | Status: SHIPPED | OUTPATIENT
Start: 2024-07-08 | End: 2024-07-18

## 2024-07-08 RX ORDER — AMMONIUM LACTATE 12 G/100G
CREAM TOPICAL AS NEEDED
Qty: 385 G | Refills: 0 | Status: SHIPPED | OUTPATIENT
Start: 2024-07-08

## 2024-07-08 RX ORDER — ALBUTEROL SULFATE 2.5 MG/3ML
2.5 SOLUTION RESPIRATORY (INHALATION) EVERY 6 HOURS PRN
Qty: 720 ML | Refills: 0 | Status: SHIPPED | OUTPATIENT
Start: 2024-07-08
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
## 2024-07-08 NOTE — ASSESSMENT & PLAN NOTE
- Rare CRISTEL use  - Continue with prn albuterol      soft/mild ttp diffusely, no rebound or guarding, no peritonea signs

## 2024-07-15 ENCOUNTER — PATIENT OUTREACH (OUTPATIENT)
Dept: FAMILY MEDICINE CLINIC | Facility: CLINIC | Age: 39
End: 2024-07-15

## 2024-07-15 NOTE — PROGRESS NOTES
REYNA TAI Student called patient in regard to referral for assisting with calling [facility]. Bulgarian interpretor [phone] assisted.     Patient was called twice. Patient did not answer calls. REYNA TAI Student will continue to be available for patient if needed.    REYNA TAI Student discussed and reviewed with preceptor REYNA Rasmussen who agreed with the plan.

## 2024-07-16 ENCOUNTER — PATIENT OUTREACH (OUTPATIENT)
Dept: FAMILY MEDICINE CLINIC | Facility: CLINIC | Age: 39
End: 2024-07-16

## 2024-07-16 NOTE — PROGRESS NOTES
REYNA TAI Student did chart review for patient. REYNA TAI Student placed call to patient using interpretor services [phone]  REYNA TAI Student placed call to patients cell and home numbers.   Patient did not answer call and there was no voicemail set up to leave a message for patient.     REYNA TAI Student will remain available for patient if needed.     REYNA TAI Student discussed and reviewed with preceptor REYNA Van who agreed with the plan.

## 2024-07-17 ENCOUNTER — PATIENT OUTREACH (OUTPATIENT)
Dept: FAMILY MEDICINE CLINIC | Facility: CLINIC | Age: 39
End: 2024-07-17

## 2024-07-17 NOTE — PROGRESS NOTES
REYNA TAI Student placed third call to patient using interpretor services [phone] to assist with language.     Interpretor tried to connect with patient twice the call was not answered and there was no voicemail active to leave a message.    Referral will be closed due to lack of response.    Unable to reach letter mailed to patient in Vietnamese.    REYNA TAI Student will remain available for patient if needed.     REYNA TAI Student discussed and reviewed with preceptor REYNA Van who agreed with the plan.

## 2024-07-17 NOTE — LETTER
07/17/24    Estimado/a Rebel De Leon un coordinador de la atención médica en [facility]  [facility]  [address] [address]  [place] [address] [phone]. Intentamos comunicarnos con usted por teléfono varias veces. Es importante que se comunique con nosotros al [phone] para que podamos ofrecerle ayuda con shamar necesidades de atención médica.     Atentamente.     Liz Bolton     [[facility] Name]

## 2024-08-05 DIAGNOSIS — R52 GENERALIZED PAIN: ICD-10-CM

## 2024-08-05 RX ORDER — GABAPENTIN 100 MG/1
100 CAPSULE ORAL 3 TIMES DAILY
Qty: 90 CAPSULE | Refills: 0 | Status: SHIPPED | OUTPATIENT
Start: 2024-08-05

## 2024-08-06 ENCOUNTER — TELEPHONE (OUTPATIENT)
Dept: OBGYN CLINIC | Facility: CLINIC | Age: 39
End: 2024-08-06

## 2024-08-21 DIAGNOSIS — J45.20 MILD INTERMITTENT ASTHMA WITHOUT COMPLICATION: ICD-10-CM

## 2024-08-21 RX ORDER — ALBUTEROL SULFATE 90 UG/1
AEROSOL, METERED RESPIRATORY (INHALATION)
Qty: 18 G | Refills: 0 | Status: SHIPPED | OUTPATIENT
Start: 2024-08-21

## 2024-12-13 ENCOUNTER — VBI (OUTPATIENT)
Dept: ADMINISTRATIVE | Facility: OTHER | Age: 39
End: 2024-12-13

## 2024-12-13 NOTE — TELEPHONE ENCOUNTER
12/13/24 6:40 AM     Chart reviewed for Pap Smear (HPV) aka Cervical Cancer Screening ; nothing is submitted to the patient's insurance at this time.     Deborah Payne MA   [facility]

## 2025-06-11 ENCOUNTER — VBI (OUTPATIENT)
Dept: ADMINISTRATIVE | Facility: OTHER | Age: 40
End: 2025-06-11

## 2025-06-11 NOTE — TELEPHONE ENCOUNTER
06/11/25 8:24 AM     Chart reviewed for Mammogram ; nothing is submitted to the patient's insurance at this time.     Deborah Payne MA   [facility]

## 2025-08-07 ENCOUNTER — VBI (OUTPATIENT)
Dept: ADMINISTRATIVE | Facility: OTHER | Age: 40
End: 2025-08-07